# Patient Record
Sex: MALE | Race: WHITE | Employment: UNEMPLOYED | URBAN - METROPOLITAN AREA
[De-identification: names, ages, dates, MRNs, and addresses within clinical notes are randomized per-mention and may not be internally consistent; named-entity substitution may affect disease eponyms.]

---

## 2018-12-27 ENCOUNTER — OFFICE VISIT (OUTPATIENT)
Dept: NEUROLOGY | Age: 63
End: 2018-12-27

## 2018-12-27 VITALS
DIASTOLIC BLOOD PRESSURE: 80 MMHG | BODY MASS INDEX: 22.75 KG/M2 | WEIGHT: 162.5 LBS | SYSTOLIC BLOOD PRESSURE: 130 MMHG | HEART RATE: 60 BPM | RESPIRATION RATE: 20 BRPM | OXYGEN SATURATION: 98 % | HEIGHT: 71 IN

## 2018-12-27 DIAGNOSIS — F02.80 LEWY BODY DEMENTIA WITHOUT BEHAVIORAL DISTURBANCE (HCC): Primary | ICD-10-CM

## 2018-12-27 DIAGNOSIS — R26.9 GAIT ABNORMALITY: ICD-10-CM

## 2018-12-27 DIAGNOSIS — R26.89 BALANCE DISORDER: ICD-10-CM

## 2018-12-27 DIAGNOSIS — G31.83 LEWY BODY DEMENTIA WITHOUT BEHAVIORAL DISTURBANCE (HCC): Primary | ICD-10-CM

## 2018-12-27 DIAGNOSIS — G20 PARKINSONISM, UNSPECIFIED PARKINSONISM TYPE (HCC): ICD-10-CM

## 2018-12-27 RX ORDER — DONEPEZIL HYDROCHLORIDE 5 MG/1
10 TABLET, FILM COATED ORAL
Qty: 30 TAB | Refills: 11 | Status: SHIPPED | OUTPATIENT
Start: 2018-12-27 | End: 2019-07-19 | Stop reason: SDUPTHER

## 2018-12-27 RX ORDER — TRAZODONE HYDROCHLORIDE 100 MG/1
100 TABLET ORAL
COMMUNITY
End: 2018-12-27 | Stop reason: SDUPTHER

## 2018-12-27 RX ORDER — MEMANTINE HYDROCHLORIDE 10 MG/1
10 TABLET ORAL 2 TIMES DAILY
Qty: 60 TAB | Refills: 11 | Status: SHIPPED | OUTPATIENT
Start: 2018-12-27 | End: 2019-01-01 | Stop reason: SDUPTHER

## 2018-12-27 RX ORDER — CARBIDOPA AND LEVODOPA 25; 100 MG/1; MG/1
TABLET ORAL
Qty: 270 TAB | Refills: 11 | Status: SHIPPED | OUTPATIENT
Start: 2018-12-27 | End: 2019-07-31 | Stop reason: ALTCHOICE

## 2018-12-27 RX ORDER — TRAZODONE HYDROCHLORIDE 100 MG/1
TABLET ORAL
Qty: 60 TAB | Refills: 5 | Status: SHIPPED | OUTPATIENT
Start: 2018-12-27 | End: 2019-06-14 | Stop reason: SDUPTHER

## 2018-12-27 RX ORDER — CARBIDOPA AND LEVODOPA 25; 100 MG/1; MG/1
1 TABLET ORAL 3 TIMES DAILY
COMMUNITY
End: 2018-12-27

## 2018-12-27 RX ORDER — MEMANTINE HYDROCHLORIDE 10 MG/1
10 TABLET ORAL 2 TIMES DAILY
COMMUNITY
End: 2018-12-27 | Stop reason: SDUPTHER

## 2018-12-27 RX ORDER — DONEPEZIL HYDROCHLORIDE 5 MG/1
TABLET, FILM COATED ORAL
COMMUNITY
End: 2018-12-27

## 2018-12-27 NOTE — PROGRESS NOTES
Patient has parkinsons and memory loss. Patients wife states that the patient has stopped working 2 years ago and patients status has declined. Tremors have increased- since their moving in October. Patients wife states that he needs 24 hour help. Patient describes the tremors as pain and tingling. Patient has trouble comprehending words and statements. Forgetting his wife's name. Patients wife states that she takes care of him around the clock (ADLs). Patient has a familial history of alzheimer's (mother).

## 2018-12-27 NOTE — PROGRESS NOTES
[facility] [address]   [facility] [address]   Alicja Parkinson 56    [facility]    Fax             Referring: Self    Chief Complaint   Patient presents with    New Patient     parkinson's disease with dementia, anxiety and insomnia    56-year-old right-handed gentleman who presents today accompanied by his wife for what they call Parkinson's disease with dementia and anxiety and insomnia. He is unable to provide any meaningful type history. He says that he is worried that he has something medically wrong with him and he discusses the fact that he has pain that is annoying and his wife translates that to indicate that his feeling of shaking on the inside. She provides a history. 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 He was in charge of shouting the plant down if there was something unsafe for making sure that things were safe. He could do all types of calculations and again was a very successful  out in [place]. Around [id number] or so both his son and his wife started to notice was becoming forgetful. At work his boss asked if there was something she needed to know that was ongoing. He started getting lost when he would drive. He would put mayonnaise in the cabinet. He would bring nails after they moved into a new apartment that that he pulled out of the wall and bring them to his wife and ask if he needed to test the metal.  The memory got progressively worse. His mother had Alzheimer's disease and she passed away in 2017. She lived with the patient and his wife and his wife is her primary caregiver. Wife recounts a story of speaking with her  on the phone while he was at work in 2017 about his dying mother and he had no idea who his wife was. He was seen and evaluated in [place] by neurology. He was diagnosed with Lewy body disease.   He did have a response to Sinemet. They moved here as he was unable to work any longer and his wife had to quit her job to care for him. She has moved here with him and they are living with her mother at this point who unfortunately has stage IV breast cancer and now she is the caregiver for 2 people. He is not had any physical therapy is there have been some insurance issue. He is on Namenda 10 mg twice a day as well as Aricept 5 mg once a day and he uses Sinemet 25/101 tablet every 6 hours. She rarely notices any outward tremor. He more complains of feeling tremor on the inside. She indicates she gets quite anxious at times and she will give him 1/4 tablet of trazodone and that seems to calm things down. This is been a precipitous decline. He has been imaged. He has not had any dopamine agonist.  Only Sinemet. No recent falls but she indicates his balance is gotten worse. His wife has to help him with ADLs. He has difficulty comprehending directions. Review of [facility] notes from [facility] medical group Dr. Tanisha Mark, finds his initial visit was in January 2018 and the history was that he has had a progressive difficulty with memory and thinking for about the last year and about 3 months ago he began to get stiff and bradykinetic with difficulty walking. He was hospitalized at [facility] where he had MRI scans and blood test.  MRI scan was said to demonstrate moderate atrophy. Blood work all normal.  He was started on Sinemet 25/101 tablet 3 times daily. At his last visit with Dr. Ricardo White, he was on Sinemet 25/101 tablet 6 times daily. He was using trazodone 100 mg nightly and he would take 50 mg during the day for anxiety. He was started on Aricept titrating up to a dose of 10 mg daily but he did not tolerate that and fell back to 5 mg. He was started on Namenda titrating to 5 mg twice daily in addition to the Aricept.   His Sinemet at the end of the visit was 25/100 size 1 tablet every 3 hours. His wife also brought in reports of the MRI scans and interestingly there was one done November 21, 2016 for complaints of memory loss abnormal gait and difficulty talking which would seem to be in keeping with the cognitive difficulty ongoing about a year before they saw Dr. Eunice Lesches. These images are reviewed personally on disc and I concur with the reports of atrophy and small vessel white matter change. Past Medical History:   Diagnosis Date    Asthma     Musculoskeletal disorder     Stool color black        Past Surgical History:   Procedure Laterality Date    HX OTHER SURGICAL  2012    knee    HX OTHER SURGICAL  2015    neck       Trazodone 100 mg at 1.5 nightly and 1/4 tablet as needed anxiety  Namenda 10 mg twice daily  Aricept 5 mg daily  Carbidopa/levodopa 25/101 tablet every 6 hours    Not on File he is allergic to Xanax and that reaction was a paradoxic reaction and that it did not calm him he became quite agitated    Social History     Tobacco Use    Smoking status: Not on file   Substance Use Topics    Alcohol use: Not on file    Drug use: Not on file       Family History   Problem Relation Age of Onset    Dementia Mother     Heart Disease Mother     Cancer Father     Stroke Brother     Stroke Other        Review of Systems  He is unable to provide secondary to his condition    Examination  Visit Vitals  /80   Pulse 60   Resp 20   Ht 5' 11\" (1.803 m)   Wt 73.7 kg (162 lb 8 oz)   SpO2 98%   BMI 22.66 kg/m²     He is well-appearing. Quite appropriately dressed and quite appropriately groomed. Appears to be well cared for. Appears to have himself in good physical condition over the years. No icterus. No carotid bruit. His heart is regular. Pulses are symmetrical.  No edema lower extremities. Neurologically he is awake and alert. He perseverates. He has some aphasia.   He has difficulty registering items and he recalls 1/3 at about 2 minutes and 2/3 with hints. He says that the first day of the new year and the year is 2003. He can recall the president with a hand. He does describe the president. He correctly identifies the floor we are on any says there are 7/4 in $1.75. He has some difficulty with following commands and has to be reoriented at times in that regard. He has reactive pupils. Disc margins not well seen. He has full versions without any gaze limitation. No nystagmus but his pursuit is saccadic. Face symmetric with symmetric facial sensation. Tongue and palate are midline. Shoulder shrug symmetrical.  He has masked facies. He is a bit hypophonic. He has minimal rest tremor at the left hand. Cogwheeling at the wrist bilaterally. He is quite bradykinetic. He resists fully in the upper and lower extremities in all muscle groups to direct testing. Reflexes are symmetrical upper and lower extremities and there are no pathologic reflexes elicited. No ataxia. He arises from the table unassisted. He ambulates down the voss with a fairly good stride and actually takes a pivot turn. He is a little imbalance in the turn. Sensory intact primary. Impression/Plan  76-year-old gentleman with parkinsonism and increasing difficulty with bradykinesia balance etc. and this parkinsonism actually was preceded by a considerable decline in cognition and given this I agree with the diagnosis of Lewy body dementia. We discussed a Lewy body dementia is a progressive disorder and does not follow the typical parkinsonian course. We discussed that people will sometimes respond to Sinemet and other dopaminergic agents initially but do not get the robust response that we see in Parkinson's and that response is limited. We discussed that he will continue to get worse. We will try to increase his Aricept back to a dose of 10 mg daily. Continue Namenda and trazodone.   Increase Sinemet to a dose of 1.5 tablets and see if we can get any more out of that in terms of his bradykinesia. Some of what he describes in terms of discomfort may be dystonia although I cannot really get a good sense of that right now we will have to evaluate that. We will try to get him some physical therapy through [facility] due to their lack of insurance issues at this point. Answered his wife's questions today. Again discussed that the prognosis for this is a more rapid decline than would be expected with another dementing type process and our hope is to try to keep him as stable as we can for as long as we can but the expectation is that this will just be a progressive decline where he will need more and  more care. Follow-up in 3 months    Lizbeth Fitzpatrick MD      This note was created using voice recognition software. Despite editing, there may be syntax errors. This note will not be viewable in [address]

## 2019-01-01 ENCOUNTER — TELEPHONE (OUTPATIENT)
Dept: NEUROLOGY | Age: 64
End: 2019-01-01

## 2019-01-01 DIAGNOSIS — R26.89 BALANCE DISORDER: ICD-10-CM

## 2019-01-01 DIAGNOSIS — G31.83 LEWY BODY DEMENTIA WITHOUT BEHAVIORAL DISTURBANCE (HCC): ICD-10-CM

## 2019-01-01 DIAGNOSIS — R26.9 GAIT ABNORMALITY: ICD-10-CM

## 2019-01-01 DIAGNOSIS — F02.80 LEWY BODY DEMENTIA WITHOUT BEHAVIORAL DISTURBANCE (HCC): ICD-10-CM

## 2019-01-01 DIAGNOSIS — G20 PARKINSONISM, UNSPECIFIED PARKINSONISM TYPE (HCC): Primary | ICD-10-CM

## 2019-01-01 RX ORDER — PRAMIPEXOLE DIHYDROCHLORIDE 0.5 MG/1
0.5 TABLET ORAL 3 TIMES DAILY
Qty: 270 TAB | Refills: 1 | Status: SHIPPED | OUTPATIENT
Start: 2019-01-01 | End: 2020-01-01

## 2019-01-01 RX ORDER — MEMANTINE HYDROCHLORIDE 10 MG/1
10 TABLET ORAL 2 TIMES DAILY
Qty: 180 TAB | Refills: 1 | Status: SHIPPED | OUTPATIENT
Start: 2019-01-01 | End: 2020-01-01

## 2019-01-01 RX ORDER — QUETIAPINE FUMARATE 50 MG/1
50 TABLET, FILM COATED ORAL
Qty: 90 TAB | Refills: 1 | Status: SHIPPED | OUTPATIENT
Start: 2019-01-01 | End: 2020-01-01 | Stop reason: SDUPTHER

## 2019-01-01 RX ORDER — DONEPEZIL HYDROCHLORIDE 5 MG/1
TABLET, FILM COATED ORAL
Qty: 180 TAB | Refills: 1 | Status: SHIPPED | OUTPATIENT
Start: 2019-01-01 | End: 2020-01-01

## 2019-01-01 RX ORDER — TRAZODONE HYDROCHLORIDE 100 MG/1
TABLET ORAL
Qty: 180 TAB | Refills: 1 | Status: SHIPPED | OUTPATIENT
Start: 2019-01-01 | End: 2020-01-01 | Stop reason: DRUGHIGH

## 2019-01-10 ENCOUNTER — APPOINTMENT (OUTPATIENT)
Dept: PHYSICAL THERAPY | Age: 64
End: 2019-01-10

## 2019-03-28 ENCOUNTER — OFFICE VISIT (OUTPATIENT)
Dept: NEUROLOGY | Age: 64
End: 2019-03-28

## 2019-03-28 VITALS
DIASTOLIC BLOOD PRESSURE: 78 MMHG | WEIGHT: 166.5 LBS | HEIGHT: 71 IN | HEART RATE: 77 BPM | OXYGEN SATURATION: 97 % | BODY MASS INDEX: 23.31 KG/M2 | SYSTOLIC BLOOD PRESSURE: 112 MMHG

## 2019-03-28 DIAGNOSIS — G31.83 LEWY BODY DEMENTIA WITHOUT BEHAVIORAL DISTURBANCE (HCC): Primary | ICD-10-CM

## 2019-03-28 DIAGNOSIS — F02.80 LEWY BODY DEMENTIA WITHOUT BEHAVIORAL DISTURBANCE (HCC): Primary | ICD-10-CM

## 2019-03-28 NOTE — PROGRESS NOTES
Chief Complaint Patient presents with  Tremors  
  parkinson's f/u, painful twitching/shaking that causes cold/clammy skin. Taking 0.25 tab of trazodone more frequently during day d/t this.  will get up 3-4 times per night.  Dementia  
  f/u.  seems to need more help throughout the day and unable to really verbalize feelings/thoughts. Very pleasant demeanor. Getting exersize 3 times per week with nephew who is a  Wanted to know what resources are available for her/pt regarding dementia and what to expect long term Coordination of Care Questions 1. Have you been to the ER, urgent care clinic outside of [facility] since your last visit? No  
    Hospitalized since your last visit? No 
 
2. Have you seen or consulted any other health care providers outside of the [address] [place] [address] since your last visit? Include any pap smears or colon screening.  No

## 2019-03-28 NOTE — PROGRESS NOTES
[facility] Neurology Clinics and [address] at [facility] Neurology Clinics at [address] [place], [address] [facility] [address] [facility], [address] [place] [address]  
[phone] Chief Complaint Patient presents with  Tremors  
  parkinson's f/u, painful twitching/shaking that causes cold/clammy skin. Taking 0.25 tab of trazodone more frequently during day d/t this.  will get up 3-4 times per night.  Dementia  
  f/u.  seems to need more help throughout the day and unable to really verbalize feelings/thoughts. Very pleasant demeanor. Getting exersize 3 times per week with nephew who is a  Current Outpatient Medications Medication Sig Dispense Refill  donepezil (ARICEPT) 5 mg tablet Take 2 Tabs by mouth nightly. 30 Tab 11  
 carbidopa-levodopa (SINEMET)  mg per tablet 1.5 tabs 6 times daily 270 Tab 11  
 memantine (NAMENDA) 10 mg tablet Take 1 Tab by mouth two (2) times a day. 60 Tab 11  
 traZODone (DESYREL) 100 mg tablet Take 1.5 tablet at bedtime daily, 1/4 of a tab as needed for anxiety 60 Tab 5 No Known Allergies Social History Tobacco Use  Smoking status: Never Smoker  Smokeless tobacco: Never Used Substance Use Topics  Alcohol use: Not Currently Frequency: Never  Drug use: Never Patient returns today accompanied by his wife. He has Lewy body disease. Last visit we increased his Sinemet to a dose of 1.5 tablets and he is taking 6 times daily and we increased his Aricept up to a dose of 10 mg daily. We continued Namenda and trazodone. The increased dose of Sinemet seems to have done well. He is moving well. He is working out with his nephew i.e. his wife's sister's son 3 times a week. No falls. Still gets 1/4 tablet of trazodone several times a day at times for agitation. No dangerous behavior.   His wife is very attentive and make sure he gets his medication around the clock. Concerns about resources. Concerns about his decline. Questions regarding such. Examination Visit Vitals /78 (BP 1 Location: Left arm, BP Patient Position: Sitting) Pulse 77 Ht 5' 11\" (1.803 m) Wt 75.5 kg (166 lb 8 oz) SpO2 97% BMI 23.22 kg/m² He looks well. He is quite appropriately dressed and groomed. And is very well cared for. He is awake and alert. He tells me that the president is Trump and that he is in some type of trouble but is not sure what kind. He believes it is May. He follows commands. He has a fairly good stride. He is not retropulsive Impression/Plan Lewy body dementia. Questions asked today regarding decline, life expectancy, medications, and how to look for resources. We will continue her current medication regimen. We will arrange for a meeting with the Alzheimer's [employer] discussed available resources. Follow with me in about 3 months Total time: 25 min Counseling / coordination time: 15 min  
> 50% counseling / coordination?: Yes re: as documented above Paulette Lazaro MD 
 
 
This note was created using voice recognition software. Despite editing, there may be syntax errors. This note will not be viewable in [address]

## 2019-04-05 ENCOUNTER — TELEPHONE (OUTPATIENT)
Dept: NEUROLOGY | Age: 64
End: 2019-04-05

## 2019-04-11 ENCOUNTER — TELEPHONE (OUTPATIENT)
Dept: NEUROLOGY | Age: 64
End: 2019-04-11

## 2019-04-11 NOTE — TELEPHONE ENCOUNTER
----- Message from Jenna Morales sent at 4/11/2019  1:58 PM EDT -----  Regarding: Dr Amira Nelson  Pt's wife (p) [phone], would like to know if he can take Zyrtec with his current medication.

## 2019-04-11 NOTE — TELEPHONE ENCOUNTER
S/w pt's wife, told her Zyrtec should be fine to take. She stated he had a rxn to alprazolam and wanted chart updated.

## 2019-04-15 ENCOUNTER — TELEPHONE (OUTPATIENT)
Dept: NEUROLOGY | Age: 64
End: 2019-04-15

## 2019-05-29 ENCOUNTER — OFFICE VISIT (OUTPATIENT)
Dept: NEUROLOGY | Age: 64
End: 2019-05-29

## 2019-05-29 VITALS
BODY MASS INDEX: 22.89 KG/M2 | DIASTOLIC BLOOD PRESSURE: 62 MMHG | HEIGHT: 71 IN | SYSTOLIC BLOOD PRESSURE: 100 MMHG | WEIGHT: 163.5 LBS | RESPIRATION RATE: 20 BRPM

## 2019-05-29 DIAGNOSIS — F02.80 LEWY BODY DEMENTIA WITHOUT BEHAVIORAL DISTURBANCE (HCC): Primary | ICD-10-CM

## 2019-05-29 DIAGNOSIS — G31.83 LEWY BODY DEMENTIA WITHOUT BEHAVIORAL DISTURBANCE (HCC): Primary | ICD-10-CM

## 2019-05-29 RX ORDER — QUETIAPINE FUMARATE 25 MG/1
25 TABLET, FILM COATED ORAL
Qty: 30 TAB | Refills: 3 | Status: SHIPPED | OUTPATIENT
Start: 2019-05-29 | End: 2019-09-13

## 2019-05-29 NOTE — PROGRESS NOTES
[facility] Neurology Clinics and [address]aman at [facility] Neurology Clinics at [place] [id number] [address] [place] [address] Dr [place], [address] [facility] [address] [facility], [address] [place] [address]  
[phone] Chief Complaint Patient presents with  Memory Loss Current Outpatient Medications Medication Sig Dispense Refill  donepezil (ARICEPT) 5 mg tablet Take 2 Tabs by mouth nightly. 30 Tab 11  
 carbidopa-levodopa (SINEMET)  mg per tablet 1.5 tabs 6 times daily 270 Tab 11  
 memantine (NAMENDA) 10 mg tablet Take 1 Tab by mouth two (2) times a day. 60 Tab 11  
 traZODone (DESYREL) 100 mg tablet Take 1.5 tablet at bedtime daily, 1/4 of a tab as needed for anxiety 60 Tab 5 Allergies Allergen Reactions  Alprazolam Rash Social History Tobacco Use  Smoking status: Never Smoker  Smokeless tobacco: Never Used Substance Use Topics  Alcohol use: Not Currently Frequency: Never  Drug use: Never Patient returns today coming by his wife for follow-up Lewy body disease. He has been maintained on Aricept, Namenda as well as Sinemet and does a real.  Last visit they were supposed to go to talk with the Alzheimer's Association. That did not happen. She today is concerned more about anxiety. Patient is taking trazodone 1-1/2 tablets at bedtime and a quarter of a tablet as needed for anxiety from his previous regimen out in [place]. The 6 times daily regimen of Sinemet also was started in [place]. This is a sooner than expected appointment. Wife notes have a significant decline. He is having more issues with anxiety during the day. She is using a little more trazodone. She is very concerned about his decline. She feeds him. She Debo. When he takes his pills now he chews them and then he looks at the glass of water like he does not know what to do. He is losing more functional capability. Examination Visit Vitals /62 Resp 20 Ht 5' 11\" (1.803 m) Wt 74.2 kg (163 lb 8 oz) BMI 22.80 kg/m² He appears very well cared for. He is appropriately dressed and appropriately groomed. He asked if he is going to wind this disease. Masked facies. Bradykinetic. No tremor. Impression/Plan Advancing Lewy body type dementia. We discussed this and answered her questions today about decline, keeping him comfortable, prognosis, progression etc.  We discussed using some Seroquel at night because he wakes up every 2 hours to take Sinemet. We discussed that that really not necessary but apparently he wakes up and needs that dose he believes. That certainly not going to hurt him but it does keep him more drowsy during the day. We discussed decline. We discussed using some Seroquel at night. We will start 25 mg nightly. We will see if that can help him sleep through the night so he will be less tired during the day but then also help with some of the anxiety. Discussed that we may need to increase that dose. Follow in about 8 weeks Morena Carlson MD 
 
Total time: 25 min Counseling / coordination time: 20 min  
> 50% counseling / coordination?: Yes re: as documented above This note was created using voice recognition software. Despite editing, there may be syntax errors. This note will not be viewable in [address]

## 2019-06-16 RX ORDER — TRAZODONE HYDROCHLORIDE 100 MG/1
TABLET ORAL
Qty: 53 TAB | Refills: 4 | Status: SHIPPED | OUTPATIENT
Start: 2019-06-16 | End: 2019-10-17 | Stop reason: SDUPTHER

## 2019-07-19 RX ORDER — DONEPEZIL HYDROCHLORIDE 5 MG/1
TABLET, FILM COATED ORAL
Qty: 60 TAB | Refills: 10 | Status: SHIPPED | OUTPATIENT
Start: 2019-07-19 | End: 2019-01-01 | Stop reason: SDUPTHER

## 2019-07-31 ENCOUNTER — OFFICE VISIT (OUTPATIENT)
Dept: NEUROLOGY | Age: 64
End: 2019-07-31

## 2019-07-31 ENCOUNTER — TELEPHONE (OUTPATIENT)
Dept: NEUROLOGY | Age: 64
End: 2019-07-31

## 2019-07-31 VITALS
SYSTOLIC BLOOD PRESSURE: 110 MMHG | HEART RATE: 84 BPM | RESPIRATION RATE: 14 BRPM | DIASTOLIC BLOOD PRESSURE: 76 MMHG | OXYGEN SATURATION: 98 % | HEIGHT: 71 IN | WEIGHT: 156 LBS | BODY MASS INDEX: 21.84 KG/M2

## 2019-07-31 DIAGNOSIS — G20 PARKINSONISM, UNSPECIFIED PARKINSONISM TYPE (HCC): Primary | ICD-10-CM

## 2019-07-31 DIAGNOSIS — G31.83 LEWY BODY DEMENTIA WITHOUT BEHAVIORAL DISTURBANCE (HCC): ICD-10-CM

## 2019-07-31 DIAGNOSIS — F02.80 LEWY BODY DEMENTIA WITHOUT BEHAVIORAL DISTURBANCE (HCC): ICD-10-CM

## 2019-07-31 NOTE — PROGRESS NOTES
[facility] Neurology Clinics and [address] Zaman at [facility] Neu[facility] Clinics at [facility] [address] [place], [address] [facility] [address] [facility] [place], [address] [place] [address]  
[phone] Chief Complaint Patient presents with  Tremors  
  sx progressing, difficulty with \"the shakes\" giving carp/levo every 2 hrs because the tremors bring anxiety. Last night it took one hr to calm down with meds and music. [facility] but recently was unable to kick legs  Dementia Current Outpatient Medications Medication Sig Dispense Refill  donepezil (ARICEPT) 5 mg tablet TAKE TWO TABLETS BY MOUTH EVERY NIGHT AT BEDTIME 60 Tab 10  
 traZODone (DESYREL) 100 mg tablet TAKE ONE AND ONE-HALF (1 & 1/2) TABLET BY MOUTH DAILY AND TAKE ONE-FOURTH TABLET DAILY AS NEEDED FOR ANXIETY 53 Tab 4  
 QUEtiapine (SEROQUEL) 25 mg tablet Take 1 Tab by mouth nightly. 30 Tab 3  carbidopa-levodopa (SINEMET)  mg per tablet 1.5 tabs 6 times daily 270 Tab 11  
 memantine (NAMENDA) 10 mg tablet Take 1 Tab by mouth two (2) times a day. 60 Tab 11 Allergies Allergen Reactions  Alprazolam Rash Social History Tobacco Use  Smoking status: Never Smoker  Smokeless tobacco: Never Used Substance Use Topics  Alcohol use: Not Currently Frequency: Never  Drug use: Never Patient returns today accompanied by his wife and she provides history. He has advancing Lewy body type dementia. I last saw them in May about 2 months ago and they come today with a sooner than expected appointment secondary to what the patient's wife reports is advancing symptoms. She says that he has a lot of difficulty with shaking and she is giving him the Sinemet every 2 hours because the tremors caused him to be anxious. He has decreased his activity.   Usually he will go to the [facility] and swims laps but recently he has been unable to do that and now he just stands in the pool. Wife has questions about progression, expectation, medications etc.  She is giving him the Sinemet every 2-3 hours. He says that he feels like he is shaking in the inside and pain that sounds like dystonia. No outward sign of dystonia. No fall. No recent infection. No nausea or vomiting. Wife again has multiple questions and concerns today and all of these were addressed Examination Visit Vitals /76 (BP 1 Location: Left arm, BP Patient Position: Sitting) Pulse 84 Resp 14 Ht 5' 11\" (1.803 m) Wt 70.8 kg (156 lb) SpO2 98% BMI 21.76 kg/m² Pleasant well-dressed gentleman. Appropriately groomed. Mask facies. Cogwheeling and bradykinesia throughout. Good strength. Steady gait. Difficulty in articulating himself. Impression/Plan Advancing Lewy body type dementia. Answered all questions and concerns today. Some of his symptoms may be due to fluctuating dopamine levels with the immediate release. We will use Rytary 4 times daily. Follow in 6 weeks. Total time: 30 min Counseling / coordination time: 20 min  
> 50% counseling / coordination?: Yes re: as documented above Ike Germain MD 
 
 
This note was created using voice recognition software. Despite editing, there may be syntax errors. This note will not be viewable in [address]

## 2019-07-31 NOTE — PROGRESS NOTES
Chief Complaint Patient presents with  Tremors  
  sx progressing, difficulty with \"the shakes\" giving carp/levo every 2 hrs because the tremors bring anxiety. Last night it took one hr to calm down with meds and music. [facility] would [facility] but recently was unable to kick legs  Dementia Would like to know if progression is faster than normal and what to expect so she may mentally prepare Coordination of Care Questions 1. Have you been to the ER, urgent care clinic outside of [facility] since your last visit? No  
    Hospitalized since your last visit? No 
 
2. Have you seen or consulted any other health care providers outside of the [address] [place] [address] since your last visit? Include any pap smears or colon screening.  No

## 2019-07-31 NOTE — TELEPHONE ENCOUNTER
Oma Mccullough is requesting that the patient can take the  carbidopa-levodopa  Not the   carbidopa-levodopa ER because the rx is $400 and they can not pay for it each month.   Oma Mccullough would like a call back today

## 2019-08-01 NOTE — TELEPHONE ENCOUNTER
Sent Sussy joiner msg to see what resources may be offered to pt regarding out of pocket cost, will call Mrs. Talamantes back once I have more information

## 2019-08-01 NOTE — TELEPHONE ENCOUNTER
S/w Sussy who gave me information about [facility] enrollment. Forms filled out and Alfredo Albin notified and she will come in to office tomorrow to fill out pt's portion.

## 2019-08-14 ENCOUNTER — TELEPHONE (OUTPATIENT)
Dept: NEUROLOGY | Age: 64
End: 2019-08-14

## 2019-08-14 NOTE — TELEPHONE ENCOUNTER
----- Message from Kiet Olguin 5777 sent at 8/14/2019  1:44 PM EDT -----  Regarding: Dr Kenna Childress, spouse, is requesting a callback because the pt is taking \"rytary\", which it seems to work, but she would like to know if he can take it every five hours throughout the day instead of six hours.        Best contact number is [phone]

## 2019-08-19 ENCOUNTER — TELEPHONE (OUTPATIENT)
Dept: NEUROLOGY | Age: 64
End: 2019-08-19

## 2019-08-19 DIAGNOSIS — G20 PARKINSONISM, UNSPECIFIED PARKINSONISM TYPE (HCC): ICD-10-CM

## 2019-08-19 DIAGNOSIS — F02.80 LEWY BODY DEMENTIA WITHOUT BEHAVIORAL DISTURBANCE (HCC): Primary | ICD-10-CM

## 2019-08-19 DIAGNOSIS — G31.83 LEWY BODY DEMENTIA WITHOUT BEHAVIORAL DISTURBANCE (HCC): Primary | ICD-10-CM

## 2019-08-19 NOTE — TELEPHONE ENCOUNTER
Shaniaary needs new rx printed to state one cap every 5 hrs.   Printed per VO Dr. Goldie Gonzalez and faxed

## 2019-08-21 ENCOUNTER — TELEPHONE (OUTPATIENT)
Dept: NEUROLOGY | Age: 64
End: 2019-08-21

## 2019-08-21 NOTE — TELEPHONE ENCOUNTER
Kaia Barbosa from [facility] is requesting a call back because they got a prescription with a dosage change and they need to clarify it.

## 2019-08-30 ENCOUNTER — TELEPHONE (OUTPATIENT)
Dept: NEUROLOGY | Age: 64
End: 2019-08-30

## 2019-08-30 DIAGNOSIS — F02.80 LEWY BODY DEMENTIA WITHOUT BEHAVIORAL DISTURBANCE (HCC): ICD-10-CM

## 2019-08-30 DIAGNOSIS — G20 PARKINSONISM, UNSPECIFIED PARKINSONISM TYPE (HCC): ICD-10-CM

## 2019-08-30 DIAGNOSIS — G31.83 LEWY BODY DEMENTIA WITHOUT BEHAVIORAL DISTURBANCE (HCC): ICD-10-CM

## 2019-08-30 NOTE — TELEPHONE ENCOUNTER
----- Message from Rere Sultana sent at 8/30/2019 10:03 AM EDT -----  Regarding: Dr Escobedo/telephone  Patient return call    Caller's first and last name and relationship (if not the patient): Charly Gilflorencio Albin,pt's wife      Best contact number(s): [phone]      Whose call is being returned:the nurse      Details to clarify the request: may be regarding his Rytary medication.  Rolanda Nielson he will need more of the Rytary  Medication in a few days       Rere Sultana

## 2019-08-30 NOTE — TELEPHONE ENCOUNTER
----- Message from Crow Sexton sent at 8/30/2019 10:08 AM EDT -----  Regarding: Dr. Latia Singh  Pt wife Amadeo Parker reporting that they will come to the office today to  medication. Best contact [phone].

## 2019-08-30 NOTE — TELEPHONE ENCOUNTER
LVM, samples of Rytary 48.75/195mg Take one cap po every five hours ordered and set aside for pt per VO Dr. Ck Heath

## 2019-09-04 ENCOUNTER — TELEPHONE (OUTPATIENT)
Dept: NEUROLOGY | Age: 64
End: 2019-09-04

## 2019-09-04 DIAGNOSIS — G31.83 LEWY BODY DEMENTIA WITHOUT BEHAVIORAL DISTURBANCE (HCC): Primary | ICD-10-CM

## 2019-09-04 DIAGNOSIS — F02.80 LEWY BODY DEMENTIA WITHOUT BEHAVIORAL DISTURBANCE (HCC): Primary | ICD-10-CM

## 2019-09-04 DIAGNOSIS — G20 PARKINSONISM, UNSPECIFIED PARKINSONISM TYPE (HCC): ICD-10-CM

## 2019-09-04 RX ORDER — PRAMIPEXOLE DIHYDROCHLORIDE 0.5 MG/1
0.5 TABLET ORAL 3 TIMES DAILY
Qty: 90 TAB | Refills: 6 | Status: SHIPPED | OUTPATIENT
Start: 2019-09-04 | End: 2019-01-01 | Stop reason: SDUPTHER

## 2019-09-04 NOTE — TELEPHONE ENCOUNTER
Rytary is ONE cap every 5 hours. This was faxed to [facility]. Pt had difficulty last night, severe twitching in legs and very uncomfortable. Is there something pt may take between now and appt on 9/13/19.     SA form filled out for aquatic therapy

## 2019-09-04 NOTE — TELEPHONE ENCOUNTER
Jeaneth Forte a  from [address] [place] [address] wanted to make sure the prescription is is accurate before they fill it.

## 2019-09-04 NOTE — TELEPHONE ENCOUNTER
----- Message from Susana Strickland sent at 9/4/2019 10:26 AM EDT -----  Regarding: Dr chambers/ Hollie Baker Message/Vendor Calls    Caller's first and last name: Minh Cabrera, spouse      Reason for call: stated she received the pt's \"rytary\" today and the direction say two pills every five hours and she wants to make sure that correct because he had been taking only one pill every five hours      Callback required yes/no and why: yes      Best contact number(s): [phone]      Details to clarify the request:      [facility]

## 2019-09-13 ENCOUNTER — OFFICE VISIT (OUTPATIENT)
Dept: NEUROLOGY | Age: 64
End: 2019-09-13

## 2019-09-13 ENCOUNTER — TELEPHONE (OUTPATIENT)
Dept: NEUROLOGY | Age: 64
End: 2019-09-13

## 2019-09-13 VITALS
OXYGEN SATURATION: 99 % | HEART RATE: 81 BPM | DIASTOLIC BLOOD PRESSURE: 68 MMHG | RESPIRATION RATE: 16 BRPM | WEIGHT: 157 LBS | SYSTOLIC BLOOD PRESSURE: 106 MMHG | BODY MASS INDEX: 21.98 KG/M2 | HEIGHT: 71 IN

## 2019-09-13 DIAGNOSIS — G20 PARKINSONISM, UNSPECIFIED PARKINSONISM TYPE (HCC): ICD-10-CM

## 2019-09-13 DIAGNOSIS — G31.83 LEWY BODY DEMENTIA WITHOUT BEHAVIORAL DISTURBANCE (HCC): Primary | ICD-10-CM

## 2019-09-13 DIAGNOSIS — F02.80 LEWY BODY DEMENTIA WITHOUT BEHAVIORAL DISTURBANCE (HCC): Primary | ICD-10-CM

## 2019-09-13 RX ORDER — QUETIAPINE FUMARATE 50 MG/1
50 TABLET, FILM COATED ORAL
Qty: 30 TAB | Refills: 3 | Status: SHIPPED | OUTPATIENT
Start: 2019-09-13 | End: 2019-01-01 | Stop reason: SDUPTHER

## 2019-09-13 NOTE — TELEPHONE ENCOUNTER
----- Message from Tasha Solorzano sent at 9/13/2019  4:04 PM EDT -----  Regarding: Dr. Grayson Serrano first and last name:[facility] Nurse      Reason for call:  Needing carnification on the work order that was sent in today for the pt       Callback required yes/no and why: yes      Best contact number(s): [phone] fax [phone]      Details to clarify the request:      Tasha Solorzano

## 2019-09-13 NOTE — PROGRESS NOTES
Chief Complaint   Patient presents with    Dementia     urinary incontinence, wears pull-ups now. A lot of caregiver role strain for wife.   Would like referral to get in home aid

## 2019-09-13 NOTE — PROGRESS NOTES
[facility] Neurology Clinics and [address] at [facility] Neurology Clinics at [address] [facility], [address] [facility] [address] [facility], [address] [place] [address]   [phone]              Chief Complaint   Patient presents with    Dementia     urinary incontinence, wears pull-ups now. Current Outpatient Medications   Medication Sig Dispense Refill    pramipexole (MIRAPEX) 0.5 mg tablet Take 1 Tab by mouth three (3) times daily. 90 Tab 6    donepezil (ARICEPT) 5 mg tablet TAKE TWO TABLETS BY MOUTH EVERY NIGHT AT BEDTIME 60 Tab 10    traZODone (DESYREL) 100 mg tablet TAKE ONE AND ONE-HALF (1 & 1/2) TABLET BY MOUTH DAILY AND TAKE ONE-FOURTH TABLET DAILY AS NEEDED FOR ANXIETY 53 Tab 4    QUEtiapine (SEROQUEL) 25 mg tablet Take 1 Tab by mouth nightly. 30 Tab 3    memantine (NAMENDA) 10 mg tablet Take 1 Tab by mouth two (2) times a day. 60 Tab 11    carbidopa-levodopa ER (RYTARY) 48. mg per capsule Take one cap po every five hours 200 Cap 0      Allergies   Allergen Reactions    Alprazolam Rash     Social History     Tobacco Use    Smoking status: Never Smoker    Smokeless tobacco: Never Used   Substance Use Topics    Alcohol use: Not Currently     Frequency: Never    Drug use: Never     Patient returns today coming by his wife for Lewy body dementia. We started to use Rytary secondary to the thought that the extended release might be better. He continues to decline. He has become incontinent of urine. Now wearing pull-ups. At night he gets more disoriented. She is not sleeping. She is exhausted. She is having burnout. She is distraught. She tries to sleep but cannot. She is also dealing as a caregiver for her mother with cancer. The Rytary has helped. Getting that through the [address] [place] [address] [facility] [employer] program.  Also she thinks the Mirapex and Seroquel are helping. He still up at night.     Examination  Visit Vitals  /68 (BP 1 Location: Left arm, BP Patient Position: Sitting)   Pulse 81   Resp 16   Ht 5' 11\" (1.803 m)   Wt 71.2 kg (157 lb)   SpO2 99%   BMI 21.90 kg/m²     He is awake and alert. Parkinsonian appearance. Not oriented. He does however come over and consult his wife when she starts to cry during our visit    Impression/Plan  Lewy body dementia advancing with significant caregiver burnout. We discussed this today at length. We are going to maintain the Rytary and Mirapex for now. Continue the trazodone. Increase Seroquel to a dose of 50 mg nightly. We will have her meet with the Alzheimer's [employer] to look for resources such as respite, aids, etc.  We will send out some home health to see if they can at least provide some time for her to get a break and to work with him. Discussed with his wife at length the decline in the need for her to get some additional assistance and she realizes that she is unable to do everything herself. She is done a good job but she does need some help and has to care for herself as well. Follow-up in 6 weeks    Total time: 30 min   Counseling / coordination time: 20 min   > 50% counseling / coordination?: Yes re: as documented above      Ye Interiano MD      This note was created using voice recognition software. Despite editing, there may be syntax errors. This note will not be viewable in [address]

## 2019-09-16 NOTE — TELEPHONE ENCOUNTER
S/w [address], notified that pt will be sent to [facility].

## 2019-10-17 RX ORDER — TRAZODONE HYDROCHLORIDE 100 MG/1
TABLET ORAL
Qty: 60 TAB | Refills: 3 | Status: SHIPPED | OUTPATIENT
Start: 2019-10-17 | End: 2019-01-01 | Stop reason: SDUPTHER

## 2019-10-18 RX ORDER — TRAZODONE HYDROCHLORIDE 100 MG/1
TABLET ORAL
Qty: 60 TAB | Refills: 3 | Status: SHIPPED | OUTPATIENT
Start: 2019-10-18 | End: 2019-10-24 | Stop reason: SDUPTHER

## 2019-10-24 ENCOUNTER — OFFICE VISIT (OUTPATIENT)
Dept: NEUROLOGY | Age: 64
End: 2019-10-24

## 2019-10-24 VITALS
DIASTOLIC BLOOD PRESSURE: 86 MMHG | HEIGHT: 71 IN | BODY MASS INDEX: 21.84 KG/M2 | OXYGEN SATURATION: 99 % | RESPIRATION RATE: 14 BRPM | HEART RATE: 83 BPM | WEIGHT: 156 LBS | SYSTOLIC BLOOD PRESSURE: 136 MMHG

## 2019-10-24 DIAGNOSIS — G31.83 LEWY BODY DEMENTIA WITHOUT BEHAVIORAL DISTURBANCE (HCC): Primary | ICD-10-CM

## 2019-10-24 DIAGNOSIS — F02.80 LEWY BODY DEMENTIA WITHOUT BEHAVIORAL DISTURBANCE (HCC): Primary | ICD-10-CM

## 2019-10-24 NOTE — PROGRESS NOTES
[facility] Neurology Clinics and [address] at [facility] Neurology Clinics at [place] [id number] [place] [address] Dr [place], [address] [facility] [address] [facility], [address] [place] [address]  
[phone] Chief Complaint Patient presents with  Dementia LBD Current Outpatient Medications Medication Sig Dispense Refill  traZODone (DESYREL) 100 mg tablet TAKE ONE AND ONE-HALF TABLET BY MOUTH DAILY AND TAKE ONE-FOURTH TABLET BY MOUTH DAILY AS NEEDED FOR ANXIETY 60 Tab 3  
 traZODone (DESYREL) 100 mg tablet TAKE ONE AND ONE-HALF TABLET BY MOUTH DAILY AND TAKE ONE-FOURTH TABLET BY MOUTH DAILY AS NEEDED FOR ANXIETY 60 Tab 3  
 QUEtiapine (SEROQUEL) 50 mg tablet Take 1 Tab by mouth nightly. 30 Tab 3  pramipexole (MIRAPEX) 0.5 mg tablet Take 1 Tab by mouth three (3) times daily. 90 Tab 6  carbidopa-levodopa ER (RYTARY) 48. mg per capsule Take one cap po every five hours 200 Cap 0  
 donepezil (ARICEPT) 5 mg tablet TAKE TWO TABLETS BY MOUTH EVERY NIGHT AT BEDTIME 60 Tab 10  
 memantine (NAMENDA) 10 mg tablet Take 1 Tab by mouth two (2) times a day. 60 Tab 11 Allergies Allergen Reactions  Alprazolam Rash Social History Tobacco Use  Smoking status: Never Smoker  Smokeless tobacco: Never Used Substance Use Topics  Alcohol use: Not Currently Frequency: Never  Drug use: Never Patient returns today for follow-up. He has Lewy body dementia. He has continued to decline. He is becoming more aphasic. He is sleeping better with the Seroquel. His wife notes that she is sleeping in another room but with the door open she can see him. He is sleeping at least 5 hours or so at night which is better for her. She did meet with the Alzheimer's Association and she has a neftali for respite and someone will be coming in a couple of hours a few times a week to help out.   Her family is also helping out more as well. Mirapex and Rytary seem to be helping with his other symptoms. No falls. He did have 2 episodes where he seemed to pocket food for a bit. No ksenia choking. We discussed speech therapy evaluation and swallow eval but we will wait and see if this is more of a recurrent issue. She does discuss swallowing technique and she does practice that. Questions and concerns today regarding medication availability due to his insurance and she is really trying to trains that. We discussed caregiver burnout and his wife really looks much more relaxed today. She was quite frazzled and upset last visit. He continues to respond well to the Desyrel as well. Discussed concerns about decline. Discussed the eventual decline. His examination today finds him to be more aphasic and he has a really difficult time even try to explain what he wants to say and he does get out some things that make sense but most do not. He is bradykinetic. Slow gait but steady. No 
 
Continue with our current regimen as outlined above in the medication section. Continue to work to prevent caregiver burnout and to acquire resources for increasing care. Watch swallowing and if he has another event we will get a formal swallowing evaluation. If nothing changes and he continues to stay stable I will see him back after the holidays Total time: 25 min Counseling / coordination time: 20 min  
> 50% counseling / coordination?: Yes re: as documented above Examination There were no vitals taken for this visit. Impression/Plan Willian Falcon MD 
 
 
This note was created using voice recognition software. Despite editing, there may be syntax errors. This note will not be viewable in [address]

## 2019-10-24 NOTE — PROGRESS NOTES
Pt is currently going to PT with [facility] for Parkinson's then will do aquatic therapy after. Approved for respit neftali which will cover 4 hours a day for 2 days per week

## 2019-10-24 NOTE — Clinical Note
October 24, 2019 Dear Mat Tran, We are pleased to provide you with secure, online access to medical information via SightCall for: 
 
Giovanna Moore. How Do I Sign Up? 1. In your internet browser, go to [email or web address] 
 
2. Click on the Sign Up Now link in the Sign In box. You will see the New Member Sign Up page. 3. Enter your SightCall Access Code exactly as it appears below. You will not need to use this code after youve completed the sign-up process. If you do not sign up before the expiration date, you must request a new code. SightCall Access Code: [email or web address] Expiration Date: 12/8/2019  5:07 PM  
 
4. In the Social Security Number field, enter your Social Security Number and your Date of Birth (mm/dd/yyyy) and click Submit. You will be taken to the next sign-up page. 5. Create a SightCall ID. This will be your SightCall login ID and cannot be changed, so think of one that is secure and easy to remember. 6. Create a SightCall password. You can change your password at any time. 7. Enter your Password Reset Question and Answer. This can be used at a later time if you forget your password. 8. Enter your e-mail address. You will receive e-mail notification when new information is available in [address] 9. Click Sign Up. You can now view the [employer] account of Giovanna MooreAlfredo Jacobsen Additional Information If you have questions, you can call [phone]. Remember, SightCall is NOT to be used for urgent needs. For medical emergencies, dial 911. Now available from your iPhone and Android! Sincerely, 
  
 
Jody Cárdenas

## 2019-11-20 ENCOUNTER — TELEPHONE (OUTPATIENT)
Dept: NEUROLOGY | Age: 64
End: 2019-11-20

## 2019-11-20 NOTE — TELEPHONE ENCOUNTER
----- Message from Christin Willard sent at 11/19/2019  4:27 PM EST -----  Regarding: Dr Jaylyn James Message/Vendor Calls    Caller's first and last name: Horace Del Valle, pt's wife      Reason for call: to give an update on [facility] plans for her       Callback required yes/no and why:for reason given below      Best contact number(s):[phone]      Details to clarify the request: pt's wife said [facility] will be faxing over a auth for her  to continue with [facility] , they want to make a smooth transition from the Aqua therapy  and they do not wait for a month  They want to keep him going they do not want to lose momentum   the fax should of came yesterday,from [facility], if not please look out for it.     She said they just enrolled into [facility] as MCR part D and they do not cover Rand Cotter she would like to know if they can get any help at all to help reduce the cost, she has also applied to the Parkinson's foundation for help with the Rand Cotter,       If there is no helep to get the cost down for the capsules can they get a higher dosage for the Tablets for the carbidopa  levodopa    Christin Willard

## 2019-11-25 ENCOUNTER — TELEPHONE (OUTPATIENT)
Dept: NEUROLOGY | Age: 64
End: 2019-11-25

## 2019-11-25 NOTE — TELEPHONE ENCOUNTER
Pt's wife states that [facility] needs exact quantity of Rytary to fill for 2 wk supply that is covered by  coupon. Pt takes 2 caps daily so total for 14 days is 70 caps.   [employer] and verified quantity

## 2019-11-25 NOTE — TELEPHONE ENCOUNTER
Pt stated that my chart will not allow pt to send messages     The parkinson program will pay in full 2 week prescription of rytary   Or 70 caps   Pt state that she can present coupon and get medicine   Pt would like a call back from the nurse

## 2019-12-16 ENCOUNTER — TELEPHONE (OUTPATIENT)
Dept: NEUROLOGY | Age: 64
End: 2019-12-16

## 2019-12-16 DIAGNOSIS — G20 PARKINSONISM, UNSPECIFIED PARKINSONISM TYPE (HCC): ICD-10-CM

## 2019-12-16 DIAGNOSIS — F02.80 LEWY BODY DEMENTIA WITHOUT BEHAVIORAL DISTURBANCE (HCC): ICD-10-CM

## 2019-12-16 DIAGNOSIS — G31.83 LEWY BODY DEMENTIA WITHOUT BEHAVIORAL DISTURBANCE (HCC): ICD-10-CM

## 2019-12-16 NOTE — TELEPHONE ENCOUNTER
Mrs Gunjan Montoya calling in ref to [facility] needing a script for Lucas Eng  [phone] Fax# [phone] As soon as possible also requesting a call back in ref to all other refills  Best # [phone]

## 2019-12-19 NOTE — TELEPHONE ENCOUNTER
----- Message from Papito Rangel sent at 12/19/2019 11:54 AM EST -----  Regarding: Dr Vickey Miller  Pt's wife Teo Mar is calling to let nurse Horace Moser know that the pt has [address] [place] [address] this is where his medication needs to come from , please call [phone], for all medications , except Margoth Yarbrough has fax this requests over twice, if you have any questions please call wife at [phone].

## 2019-12-19 NOTE — TELEPHONE ENCOUNTER
S/w pt, notified that Mirapex, donepezil, memantine, trazodone, and Seroquel was sent to [facility]

## 2019-12-27 NOTE — TELEPHONE ENCOUNTER
Prior Auth APPROVED for Quetiapine 50mg by [facility] [employer] [facility]. Effective Dates 12/27/19 - 12/31/20. Case #(use patient ID). Approval will be scanned into media. Please send Rx to patient's preferred pharmacy, if necessary.  Called patient to inform them of approval.

## 2019-12-27 NOTE — TELEPHONE ENCOUNTER
Pt wife prashant toney stated that [employer] need a pa for   QUEtiapine (SEROQUEL) 50 mg tablet  Pt has a 2 weeks supply but will take 2 weeks for a refill

## 2019-12-27 NOTE — TELEPHONE ENCOUNTER
Prior Auth submitted URGENTLY for Quetiapine 50mg to [facility] [employer] [facility] via Cover My Meds. Status Pending. Allow 24 hours for response.      CMM Key: [id number]

## 2020-01-01 ENCOUNTER — APPOINTMENT (OUTPATIENT)
Dept: GENERAL RADIOLOGY | Age: 65
DRG: 871 | End: 2020-01-01
Attending: SURGERY
Payer: MEDICARE

## 2020-01-01 ENCOUNTER — OFFICE VISIT (OUTPATIENT)
Dept: NEUROLOGY | Age: 65
End: 2020-01-01
Payer: MEDICARE

## 2020-01-01 ENCOUNTER — HOSPICE ADMISSION (OUTPATIENT)
Dept: HOSPICE | Facility: HOSPICE | Age: 65
End: 2020-01-01
Payer: MEDICARE

## 2020-01-01 ENCOUNTER — OFFICE VISIT (OUTPATIENT)
Dept: NEUROLOGY | Age: 65
End: 2020-01-01

## 2020-01-01 ENCOUNTER — TELEPHONE (OUTPATIENT)
Dept: NEUROLOGY | Age: 65
End: 2020-01-01

## 2020-01-01 ENCOUNTER — APPOINTMENT (OUTPATIENT)
Dept: GENERAL RADIOLOGY | Age: 65
DRG: 871 | End: 2020-01-01
Attending: EMERGENCY MEDICINE
Payer: MEDICARE

## 2020-01-01 ENCOUNTER — HOSPITAL ENCOUNTER (INPATIENT)
Age: 65
LOS: 7 days | DRG: 056 | End: 2020-12-18
Attending: FAMILY MEDICINE | Admitting: FAMILY MEDICINE
Payer: OTHER MISCELLANEOUS

## 2020-01-01 ENCOUNTER — HOSPITAL ENCOUNTER (INPATIENT)
Age: 65
LOS: 8 days | Discharge: HOSPICE | DRG: 871 | End: 2020-12-11
Attending: EMERGENCY MEDICINE | Admitting: INTERNAL MEDICINE
Payer: MEDICARE

## 2020-01-01 ENCOUNTER — APPOINTMENT (OUTPATIENT)
Dept: GENERAL RADIOLOGY | Age: 65
DRG: 871 | End: 2020-01-01
Attending: NURSE PRACTITIONER
Payer: MEDICARE

## 2020-01-01 ENCOUNTER — VIRTUAL VISIT (OUTPATIENT)
Dept: NEUROLOGY | Age: 65
End: 2020-01-01

## 2020-01-01 ENCOUNTER — APPOINTMENT (OUTPATIENT)
Dept: GENERAL RADIOLOGY | Age: 65
DRG: 871 | End: 2020-01-01
Attending: INTERNAL MEDICINE
Payer: MEDICARE

## 2020-01-01 ENCOUNTER — APPOINTMENT (OUTPATIENT)
Dept: CT IMAGING | Age: 65
DRG: 871 | End: 2020-01-01
Attending: EMERGENCY MEDICINE
Payer: MEDICARE

## 2020-01-01 VITALS
SYSTOLIC BLOOD PRESSURE: 98 MMHG | WEIGHT: 156 LBS | BODY MASS INDEX: 21.84 KG/M2 | RESPIRATION RATE: 16 BRPM | DIASTOLIC BLOOD PRESSURE: 76 MMHG | OXYGEN SATURATION: 98 % | HEART RATE: 79 BPM | HEIGHT: 71 IN | TEMPERATURE: 97.4 F

## 2020-01-01 VITALS
SYSTOLIC BLOOD PRESSURE: 130 MMHG | WEIGHT: 160 LBS | HEIGHT: 71 IN | BODY MASS INDEX: 22.4 KG/M2 | DIASTOLIC BLOOD PRESSURE: 70 MMHG | RESPIRATION RATE: 20 BRPM

## 2020-01-01 VITALS
DIASTOLIC BLOOD PRESSURE: 77 MMHG | OXYGEN SATURATION: 92 % | BODY MASS INDEX: 25.69 KG/M2 | SYSTOLIC BLOOD PRESSURE: 113 MMHG | HEART RATE: 77 BPM | RESPIRATION RATE: 12 BRPM | HEIGHT: 67 IN | TEMPERATURE: 98.9 F | WEIGHT: 163.7 LBS

## 2020-01-01 VITALS
SYSTOLIC BLOOD PRESSURE: 77 MMHG | HEIGHT: 67 IN | BODY MASS INDEX: 25.71 KG/M2 | HEART RATE: 172 BPM | DIASTOLIC BLOOD PRESSURE: 50 MMHG | OXYGEN SATURATION: 83 % | TEMPERATURE: 101.3 F | WEIGHT: 163.8 LBS | RESPIRATION RATE: 24 BRPM

## 2020-01-01 DIAGNOSIS — G20 PARKINSONISM, UNSPECIFIED PARKINSONISM TYPE (HCC): ICD-10-CM

## 2020-01-01 DIAGNOSIS — G20 DEMENTIA DUE TO PARKINSON'S DISEASE WITHOUT BEHAVIORAL DISTURBANCE (HCC): Chronic | ICD-10-CM

## 2020-01-01 DIAGNOSIS — N17.9 AKI (ACUTE KIDNEY INJURY) (HCC): ICD-10-CM

## 2020-01-01 DIAGNOSIS — R26.89 BALANCE DISORDER: ICD-10-CM

## 2020-01-01 DIAGNOSIS — F02.80 DEMENTIA DUE TO PARKINSON'S DISEASE WITHOUT BEHAVIORAL DISTURBANCE (HCC): Chronic | ICD-10-CM

## 2020-01-01 DIAGNOSIS — F02.80 LEWY BODY DEMENTIA WITHOUT BEHAVIORAL DISTURBANCE (HCC): ICD-10-CM

## 2020-01-01 DIAGNOSIS — G31.83 LEWY BODY DEMENTIA WITHOUT BEHAVIORAL DISTURBANCE (HCC): Primary | ICD-10-CM

## 2020-01-01 DIAGNOSIS — R26.9 GAIT ABNORMALITY: ICD-10-CM

## 2020-01-01 DIAGNOSIS — R45.1 RESTLESSNESS AND AGITATION: ICD-10-CM

## 2020-01-01 DIAGNOSIS — U07.1 COVID-19: Primary | ICD-10-CM

## 2020-01-01 DIAGNOSIS — F02.80 LEWY BODY DEMENTIA WITHOUT BEHAVIORAL DISTURBANCE (HCC): Primary | ICD-10-CM

## 2020-01-01 DIAGNOSIS — G31.83 LEWY BODY DEMENTIA WITHOUT BEHAVIORAL DISTURBANCE (HCC): ICD-10-CM

## 2020-01-01 DIAGNOSIS — R06.89 IRREGULAR BREATHING PATTERN: ICD-10-CM

## 2020-01-01 DIAGNOSIS — R41.89 UNRESPONSIVE: ICD-10-CM

## 2020-01-01 DIAGNOSIS — R06.4 LABORED BREATHING: ICD-10-CM

## 2020-01-01 DIAGNOSIS — G20 PARKINSON'S DISEASE (HCC): ICD-10-CM

## 2020-01-01 DIAGNOSIS — R09.02 HYPOXIA: ICD-10-CM

## 2020-01-01 DIAGNOSIS — R45.1 RESTLESSNESS: ICD-10-CM

## 2020-01-01 DIAGNOSIS — R45.1 RESTLESSNESS AND AGITATION: Primary | ICD-10-CM

## 2020-01-01 DIAGNOSIS — Z51.5 HOSPICE CARE: ICD-10-CM

## 2020-01-01 DIAGNOSIS — R52 GENERALIZED PAIN: ICD-10-CM

## 2020-01-01 LAB
ALBUMIN SERPL-MCNC: 3 G/DL (ref 3.5–5)
ALBUMIN SERPL-MCNC: 3 G/DL (ref 3.5–5)
ALBUMIN SERPL-MCNC: 3.1 G/DL (ref 3.5–5)
ALBUMIN SERPL-MCNC: 3.2 G/DL (ref 3.5–5)
ALBUMIN SERPL-MCNC: 3.4 G/DL (ref 3.5–5)
ALBUMIN SERPL-MCNC: 3.5 G/DL (ref 3.5–5)
ALBUMIN/GLOB SERPL: 0.8 {RATIO} (ref 1.1–2.2)
ALP SERPL-CCNC: 86 U/L (ref 45–117)
ALT SERPL-CCNC: 28 U/L (ref 12–78)
ANION GAP SERPL CALC-SCNC: 12 MMOL/L (ref 5–15)
ANION GAP SERPL CALC-SCNC: 5 MMOL/L (ref 5–15)
ANION GAP SERPL CALC-SCNC: 7 MMOL/L (ref 5–15)
ANION GAP SERPL CALC-SCNC: 8 MMOL/L (ref 5–15)
APPEARANCE UR: CLEAR
AST SERPL-CCNC: 23 U/L (ref 15–37)
ATRIAL RATE: 100 BPM
ATRIAL RATE: 277 BPM
BACTERIA SPEC CULT: ABNORMAL
BACTERIA SPEC CULT: NORMAL
BACTERIA URNS QL MICRO: ABNORMAL /HPF
BASOPHILS # BLD: 0 K/UL (ref 0–0.1)
BASOPHILS NFR BLD: 0 % (ref 0–1)
BILIRUB SERPL-MCNC: 0.7 MG/DL (ref 0.2–1)
BILIRUB UR QL: NEGATIVE
BNP SERPL-MCNC: 730 PG/ML
BUN SERPL-MCNC: 20 MG/DL (ref 6–20)
BUN SERPL-MCNC: 21 MG/DL (ref 6–20)
BUN SERPL-MCNC: 22 MG/DL (ref 6–20)
BUN SERPL-MCNC: 28 MG/DL (ref 6–20)
BUN SERPL-MCNC: 30 MG/DL (ref 6–20)
BUN SERPL-MCNC: 60 MG/DL (ref 6–20)
BUN/CREAT SERPL: 20 (ref 12–20)
BUN/CREAT SERPL: 26 (ref 12–20)
BUN/CREAT SERPL: 29 (ref 12–20)
BUN/CREAT SERPL: 30 (ref 12–20)
BUN/CREAT SERPL: 31 (ref 12–20)
BUN/CREAT SERPL: 35 (ref 12–20)
CALCIUM SERPL-MCNC: 7.8 MG/DL (ref 8.5–10.1)
CALCIUM SERPL-MCNC: 8 MG/DL (ref 8.5–10.1)
CALCIUM SERPL-MCNC: 8.3 MG/DL (ref 8.5–10.1)
CALCIUM SERPL-MCNC: 8.4 MG/DL (ref 8.5–10.1)
CALCIUM SERPL-MCNC: 8.5 MG/DL (ref 8.5–10.1)
CALCIUM SERPL-MCNC: 8.7 MG/DL (ref 8.5–10.1)
CALCULATED P AXIS, ECG09: 77 DEGREES
CALCULATED R AXIS, ECG10: 64 DEGREES
CALCULATED R AXIS, ECG10: 9 DEGREES
CALCULATED T AXIS, ECG11: -154 DEGREES
CALCULATED T AXIS, ECG11: 58 DEGREES
CHLORIDE SERPL-SCNC: 109 MMOL/L (ref 97–108)
CHLORIDE SERPL-SCNC: 114 MMOL/L (ref 97–108)
CHLORIDE SERPL-SCNC: 119 MMOL/L (ref 97–108)
CHLORIDE SERPL-SCNC: 123 MMOL/L (ref 97–108)
CHLORIDE SERPL-SCNC: 124 MMOL/L (ref 97–108)
CHLORIDE SERPL-SCNC: 126 MMOL/L (ref 97–108)
CK SERPL-CCNC: 47 U/L (ref 39–308)
CK SERPL-CCNC: 73 U/L (ref 39–308)
CK SERPL-CCNC: 80 U/L (ref 39–308)
CK SERPL-CCNC: 81 U/L (ref 39–308)
CO2 SERPL-SCNC: 18 MMOL/L (ref 21–32)
CO2 SERPL-SCNC: 24 MMOL/L (ref 21–32)
CO2 SERPL-SCNC: 24 MMOL/L (ref 21–32)
CO2 SERPL-SCNC: 25 MMOL/L (ref 21–32)
COLOR UR: ABNORMAL
COVID-19 RAPID TEST, COVR: DETECTED
CREAT SERPL-MCNC: 0.63 MG/DL (ref 0.7–1.3)
CREAT SERPL-MCNC: 0.72 MG/DL (ref 0.7–1.3)
CREAT SERPL-MCNC: 0.76 MG/DL (ref 0.7–1.3)
CREAT SERPL-MCNC: 0.89 MG/DL (ref 0.7–1.3)
CREAT SERPL-MCNC: 1 MG/DL (ref 0.7–1.3)
CREAT SERPL-MCNC: 2.93 MG/DL (ref 0.7–1.3)
CRP SERPL-MCNC: 4.3 MG/DL (ref 0–0.6)
D DIMER PPP FEU-MCNC: 16.35 MG/L FEU (ref 0–0.65)
D DIMER PPP FEU-MCNC: 17.58 MG/L FEU (ref 0–0.65)
DATE LAST DOSE: ABNORMAL
DATE LAST DOSE: NORMAL
DIAGNOSIS, 93000: NORMAL
DIAGNOSIS, 93000: NORMAL
DIFFERENTIAL METHOD BLD: ABNORMAL
EOSINOPHIL # BLD: 0 K/UL (ref 0–0.4)
EOSINOPHIL # BLD: 0 K/UL (ref 0–0.4)
EOSINOPHIL # BLD: 0.1 K/UL (ref 0–0.4)
EOSINOPHIL # BLD: 0.4 K/UL (ref 0–0.4)
EOSINOPHIL NFR BLD: 0 % (ref 0–7)
EOSINOPHIL NFR BLD: 0 % (ref 0–7)
EOSINOPHIL NFR BLD: 1 % (ref 0–7)
EOSINOPHIL NFR BLD: 3 % (ref 0–7)
EPITH CASTS URNS QL MICRO: ABNORMAL /LPF
ERYTHROCYTE [DISTWIDTH] IN BLOOD BY AUTOMATED COUNT: 12.6 % (ref 11.5–14.5)
ERYTHROCYTE [DISTWIDTH] IN BLOOD BY AUTOMATED COUNT: 13.1 % (ref 11.5–14.5)
ERYTHROCYTE [DISTWIDTH] IN BLOOD BY AUTOMATED COUNT: 13.5 % (ref 11.5–14.5)
ERYTHROCYTE [DISTWIDTH] IN BLOOD BY AUTOMATED COUNT: 13.5 % (ref 11.5–14.5)
ERYTHROCYTE [SEDIMENTATION RATE] IN BLOOD: 18 MM/HR (ref 0–20)
GLOBULIN SER CALC-MCNC: 3.6 G/DL (ref 2–4)
GLUCOSE BLD STRIP.AUTO-MCNC: 102 MG/DL (ref 65–100)
GLUCOSE BLD STRIP.AUTO-MCNC: 102 MG/DL (ref 65–100)
GLUCOSE BLD STRIP.AUTO-MCNC: 106 MG/DL (ref 65–100)
GLUCOSE BLD STRIP.AUTO-MCNC: 108 MG/DL (ref 65–100)
GLUCOSE BLD STRIP.AUTO-MCNC: 114 MG/DL (ref 65–100)
GLUCOSE BLD STRIP.AUTO-MCNC: 115 MG/DL (ref 65–100)
GLUCOSE BLD STRIP.AUTO-MCNC: 115 MG/DL (ref 65–100)
GLUCOSE BLD STRIP.AUTO-MCNC: 117 MG/DL (ref 65–100)
GLUCOSE BLD STRIP.AUTO-MCNC: 118 MG/DL (ref 65–100)
GLUCOSE BLD STRIP.AUTO-MCNC: 119 MG/DL (ref 65–100)
GLUCOSE BLD STRIP.AUTO-MCNC: 123 MG/DL (ref 65–100)
GLUCOSE BLD STRIP.AUTO-MCNC: 141 MG/DL (ref 65–100)
GLUCOSE BLD STRIP.AUTO-MCNC: 146 MG/DL (ref 65–100)
GLUCOSE BLD STRIP.AUTO-MCNC: 83 MG/DL (ref 65–100)
GLUCOSE BLD STRIP.AUTO-MCNC: 84 MG/DL (ref 65–100)
GLUCOSE BLD STRIP.AUTO-MCNC: 92 MG/DL (ref 65–100)
GLUCOSE BLD STRIP.AUTO-MCNC: 98 MG/DL (ref 65–100)
GLUCOSE BLD STRIP.AUTO-MCNC: 99 MG/DL (ref 65–100)
GLUCOSE BLD STRIP.AUTO-MCNC: 99 MG/DL (ref 65–100)
GLUCOSE SERPL-MCNC: 118 MG/DL (ref 65–100)
GLUCOSE SERPL-MCNC: 125 MG/DL (ref 65–100)
GLUCOSE SERPL-MCNC: 188 MG/DL (ref 65–100)
GLUCOSE SERPL-MCNC: 96 MG/DL (ref 65–100)
GLUCOSE SERPL-MCNC: 98 MG/DL (ref 65–100)
GLUCOSE SERPL-MCNC: 99 MG/DL (ref 65–100)
GLUCOSE UR STRIP.AUTO-MCNC: NEGATIVE MG/DL
GRAM STN SPEC: ABNORMAL
HCT VFR BLD AUTO: 31.7 % (ref 36.6–50.3)
HCT VFR BLD AUTO: 32.3 % (ref 36.6–50.3)
HCT VFR BLD AUTO: 33.8 % (ref 36.6–50.3)
HCT VFR BLD AUTO: 42.7 % (ref 36.6–50.3)
HGB BLD-MCNC: 10.4 G/DL (ref 12.1–17)
HGB BLD-MCNC: 10.4 G/DL (ref 12.1–17)
HGB BLD-MCNC: 11.2 G/DL (ref 12.1–17)
HGB BLD-MCNC: 13.8 G/DL (ref 12.1–17)
HGB UR QL STRIP: NEGATIVE
IMM GRANULOCYTES # BLD AUTO: 0 K/UL (ref 0–0.04)
IMM GRANULOCYTES # BLD AUTO: 0 K/UL (ref 0–0.04)
IMM GRANULOCYTES # BLD AUTO: 0.1 K/UL (ref 0–0.04)
IMM GRANULOCYTES # BLD AUTO: 0.1 K/UL (ref 0–0.04)
IMM GRANULOCYTES NFR BLD AUTO: 0 % (ref 0–0.5)
IMM GRANULOCYTES NFR BLD AUTO: 0 % (ref 0–0.5)
IMM GRANULOCYTES NFR BLD AUTO: 1 % (ref 0–0.5)
IMM GRANULOCYTES NFR BLD AUTO: 1 % (ref 0–0.5)
INR PPP: 1.1 (ref 0.9–1.1)
INR PPP: 1.2 (ref 0.9–1.1)
INR PPP: 1.3 (ref 0.9–1.1)
INR PPP: 1.4 (ref 0.9–1.1)
KETONES UR QL STRIP.AUTO: ABNORMAL MG/DL
LACTATE SERPL-SCNC: 1 MMOL/L (ref 0.4–2)
LACTATE SERPL-SCNC: 1.2 MMOL/L (ref 0.4–2)
LACTATE SERPL-SCNC: 1.3 MMOL/L (ref 0.4–2)
LACTATE SERPL-SCNC: 1.4 MMOL/L (ref 0.4–2)
LEUKOCYTE ESTERASE UR QL STRIP.AUTO: ABNORMAL
LYMPHOCYTES # BLD: 1 K/UL (ref 0.8–3.5)
LYMPHOCYTES # BLD: 1 K/UL (ref 0.8–3.5)
LYMPHOCYTES # BLD: 1.3 K/UL (ref 0.8–3.5)
LYMPHOCYTES # BLD: 1.7 K/UL (ref 0.8–3.5)
LYMPHOCYTES NFR BLD: 10 % (ref 12–49)
LYMPHOCYTES NFR BLD: 13 % (ref 12–49)
LYMPHOCYTES NFR BLD: 9 % (ref 12–49)
LYMPHOCYTES NFR BLD: 9 % (ref 12–49)
MAGNESIUM SERPL-MCNC: 2.1 MG/DL (ref 1.6–2.4)
MAGNESIUM SERPL-MCNC: 2.1 MG/DL (ref 1.6–2.4)
MAGNESIUM SERPL-MCNC: 2.2 MG/DL (ref 1.6–2.4)
MAGNESIUM SERPL-MCNC: 2.2 MG/DL (ref 1.6–2.4)
MAGNESIUM SERPL-MCNC: 2.8 MG/DL (ref 1.6–2.4)
MCH RBC QN AUTO: 30.6 PG (ref 26–34)
MCH RBC QN AUTO: 30.6 PG (ref 26–34)
MCH RBC QN AUTO: 31 PG (ref 26–34)
MCH RBC QN AUTO: 31.4 PG (ref 26–34)
MCHC RBC AUTO-ENTMCNC: 32.2 G/DL (ref 30–36.5)
MCHC RBC AUTO-ENTMCNC: 32.3 G/DL (ref 30–36.5)
MCHC RBC AUTO-ENTMCNC: 32.8 G/DL (ref 30–36.5)
MCHC RBC AUTO-ENTMCNC: 33.1 G/DL (ref 30–36.5)
MCV RBC AUTO: 93.6 FL (ref 80–99)
MCV RBC AUTO: 94.7 FL (ref 80–99)
MCV RBC AUTO: 95 FL (ref 80–99)
MCV RBC AUTO: 95.8 FL (ref 80–99)
MONOCYTES # BLD: 0.3 K/UL (ref 0–1)
MONOCYTES # BLD: 0.3 K/UL (ref 0–1)
MONOCYTES # BLD: 0.5 K/UL (ref 0–1)
MONOCYTES # BLD: 0.8 K/UL (ref 0–1)
MONOCYTES NFR BLD: 3 % (ref 5–13)
MONOCYTES NFR BLD: 3 % (ref 5–13)
MONOCYTES NFR BLD: 4 % (ref 5–13)
MONOCYTES NFR BLD: 7 % (ref 5–13)
MUCOUS THREADS URNS QL MICRO: ABNORMAL /LPF
NEUTS SEG # BLD: 10 K/UL (ref 1.8–8)
NEUTS SEG # BLD: 10.9 K/UL (ref 1.8–8)
NEUTS SEG # BLD: 9.3 K/UL (ref 1.8–8)
NEUTS SEG # BLD: 9.4 K/UL (ref 1.8–8)
NEUTS SEG NFR BLD: 76 % (ref 32–75)
NEUTS SEG NFR BLD: 86 % (ref 32–75)
NEUTS SEG NFR BLD: 86 % (ref 32–75)
NEUTS SEG NFR BLD: 88 % (ref 32–75)
NITRITE UR QL STRIP.AUTO: NEGATIVE
NRBC # BLD: 0 K/UL (ref 0–0.01)
NRBC BLD-RTO: 0 PER 100 WBC
P-R INTERVAL, ECG05: 134 MS
PH UR STRIP: 6.5 [PH] (ref 5–8)
PHOSPHATE SERPL-MCNC: 1.8 MG/DL (ref 2.6–4.7)
PHOSPHATE SERPL-MCNC: 1.9 MG/DL (ref 2.6–4.7)
PHOSPHATE SERPL-MCNC: 2.6 MG/DL (ref 2.6–4.7)
PHOSPHATE SERPL-MCNC: 3 MG/DL (ref 2.6–4.7)
PHOSPHATE SERPL-MCNC: 3.7 MG/DL (ref 2.6–4.7)
PLATELET # BLD AUTO: 129 K/UL (ref 150–400)
PLATELET # BLD AUTO: 133 K/UL (ref 150–400)
PLATELET # BLD AUTO: 148 K/UL (ref 150–400)
PLATELET # BLD AUTO: 198 K/UL (ref 150–400)
PMV BLD AUTO: 10.1 FL (ref 8.9–12.9)
PMV BLD AUTO: 10.7 FL (ref 8.9–12.9)
PMV BLD AUTO: 11.1 FL (ref 8.9–12.9)
PMV BLD AUTO: 9.9 FL (ref 8.9–12.9)
POTASSIUM SERPL-SCNC: 3.1 MMOL/L (ref 3.5–5.1)
POTASSIUM SERPL-SCNC: 3.4 MMOL/L (ref 3.5–5.1)
POTASSIUM SERPL-SCNC: 3.5 MMOL/L (ref 3.5–5.1)
POTASSIUM SERPL-SCNC: 3.8 MMOL/L (ref 3.5–5.1)
POTASSIUM SERPL-SCNC: 4 MMOL/L (ref 3.5–5.1)
POTASSIUM SERPL-SCNC: 4 MMOL/L (ref 3.5–5.1)
PREALB SERPL-MCNC: 9.8 MG/DL (ref 20–40)
PROCALCITONIN SERPL-MCNC: 0.65 NG/ML
PROT SERPL-MCNC: 6.6 G/DL (ref 6.4–8.2)
PROT UR STRIP-MCNC: ABNORMAL MG/DL
PROTHROMBIN TIME: 11.9 SEC (ref 9–11.1)
PROTHROMBIN TIME: 12.4 SEC (ref 9–11.1)
PROTHROMBIN TIME: 13.2 SEC (ref 9–11.1)
PROTHROMBIN TIME: 14.6 SEC (ref 9–11.1)
Q-T INTERVAL, ECG07: 240 MS
Q-T INTERVAL, ECG07: 348 MS
QRS DURATION, ECG06: 86 MS
QRS DURATION, ECG06: 88 MS
QTC CALCULATION (BEZET), ECG08: 418 MS
QTC CALCULATION (BEZET), ECG08: 448 MS
RBC # BLD AUTO: 3.31 M/UL (ref 4.1–5.7)
RBC # BLD AUTO: 3.4 M/UL (ref 4.1–5.7)
RBC # BLD AUTO: 3.61 M/UL (ref 4.1–5.7)
RBC # BLD AUTO: 4.51 M/UL (ref 4.1–5.7)
RBC #/AREA URNS HPF: ABNORMAL /HPF (ref 0–5)
REPORTED DOSE,DOSE: ABNORMAL UNITS
REPORTED DOSE,DOSE: NORMAL UNITS
REPORTED DOSE/TIME,TMG: ABNORMAL
REPORTED DOSE/TIME,TMG: NORMAL
SERVICE CMNT-IMP: ABNORMAL
SERVICE CMNT-IMP: NORMAL
SODIUM SERPL-SCNC: 141 MMOL/L (ref 136–145)
SODIUM SERPL-SCNC: 146 MMOL/L (ref 136–145)
SODIUM SERPL-SCNC: 151 MMOL/L (ref 136–145)
SODIUM SERPL-SCNC: 154 MMOL/L (ref 136–145)
SODIUM SERPL-SCNC: 154 MMOL/L (ref 136–145)
SODIUM SERPL-SCNC: 156 MMOL/L (ref 136–145)
SOURCE, COVRS: ABNORMAL
SP GR UR REFRACTOMETRY: 1.02 (ref 1–1.03)
SPECIMEN SOURCE, FCOV2M: ABNORMAL
SPECIMEN TYPE, XMCV1T: ABNORMAL
TRANSFERRIN SERPL-MCNC: 92 MG/DL (ref 177–329)
TROPONIN I SERPL-MCNC: <0.05 NG/ML
TROPONIN I SERPL-MCNC: <0.05 NG/ML
UROBILINOGEN UR QL STRIP.AUTO: 1 EU/DL (ref 0.2–1)
VANCOMYCIN TROUGH SERPL-MCNC: 3.7 UG/ML (ref 5–10)
VANCOMYCIN TROUGH SERPL-MCNC: 9.9 UG/ML (ref 5–10)
VENTRICULAR RATE, ECG03: 100 BPM
VENTRICULAR RATE, ECG03: 183 BPM
WBC # BLD AUTO: 10.8 K/UL (ref 4.1–11.1)
WBC # BLD AUTO: 11.4 K/UL (ref 4.1–11.1)
WBC # BLD AUTO: 12.4 K/UL (ref 4.1–11.1)
WBC # BLD AUTO: 12.7 K/UL (ref 4.1–11.1)
WBC URNS QL MICRO: ABNORMAL /HPF (ref 0–4)

## 2020-01-01 PROCEDURE — 65660000001 HC RM ICU INTERMED STEPDOWN

## 2020-01-01 PROCEDURE — 80048 BASIC METABOLIC PNL TOTAL CA: CPT

## 2020-01-01 PROCEDURE — 74011250636 HC RX REV CODE- 250/636: Performed by: INTERNAL MEDICINE

## 2020-01-01 PROCEDURE — 74011250636 HC RX REV CODE- 250/636: Performed by: NURSE PRACTITIONER

## 2020-01-01 PROCEDURE — 82040 ASSAY OF SERUM ALBUMIN: CPT

## 2020-01-01 PROCEDURE — 82962 GLUCOSE BLOOD TEST: CPT

## 2020-01-01 PROCEDURE — 83735 ASSAY OF MAGNESIUM: CPT

## 2020-01-01 PROCEDURE — 74011000250 HC RX REV CODE- 250: Performed by: NURSE PRACTITIONER

## 2020-01-01 PROCEDURE — 74011250637 HC RX REV CODE- 250/637: Performed by: NURSE PRACTITIONER

## 2020-01-01 PROCEDURE — 74011250636 HC RX REV CODE- 250/636: Performed by: SURGERY

## 2020-01-01 PROCEDURE — 84100 ASSAY OF PHOSPHORUS: CPT

## 2020-01-01 PROCEDURE — 87070 CULTURE OTHR SPECIMN AEROBIC: CPT

## 2020-01-01 PROCEDURE — 85652 RBC SED RATE AUTOMATED: CPT

## 2020-01-01 PROCEDURE — 74011250637 HC RX REV CODE- 250/637: Performed by: INTERNAL MEDICINE

## 2020-01-01 PROCEDURE — 77010033711 HC HIGH FLOW OXYGEN

## 2020-01-01 PROCEDURE — 83605 ASSAY OF LACTIC ACID: CPT

## 2020-01-01 PROCEDURE — 65610000006 HC RM INTENSIVE CARE

## 2020-01-01 PROCEDURE — 65270000029 HC RM PRIVATE

## 2020-01-01 PROCEDURE — 85379 FIBRIN DEGRADATION QUANT: CPT

## 2020-01-01 PROCEDURE — 82550 ASSAY OF CK (CPK): CPT

## 2020-01-01 PROCEDURE — G8420 CALC BMI NORM PARAMETERS: HCPCS | Performed by: PSYCHIATRY & NEUROLOGY

## 2020-01-01 PROCEDURE — 99232 SBSQ HOSP IP/OBS MODERATE 35: CPT | Performed by: FAMILY MEDICINE

## 2020-01-01 PROCEDURE — 94640 AIRWAY INHALATION TREATMENT: CPT

## 2020-01-01 PROCEDURE — 85025 COMPLETE CBC W/AUTO DIFF WBC: CPT

## 2020-01-01 PROCEDURE — 74011250636 HC RX REV CODE- 250/636: Performed by: FAMILY MEDICINE

## 2020-01-01 PROCEDURE — 99285 EMERGENCY DEPT VISIT HI MDM: CPT

## 2020-01-01 PROCEDURE — 85610 PROTHROMBIN TIME: CPT

## 2020-01-01 PROCEDURE — 93005 ELECTROCARDIOGRAM TRACING: CPT

## 2020-01-01 PROCEDURE — 94669 MECHANICAL CHEST WALL OSCILL: CPT

## 2020-01-01 PROCEDURE — 36415 COLL VENOUS BLD VENIPUNCTURE: CPT

## 2020-01-01 PROCEDURE — 99233 SBSQ HOSP IP/OBS HIGH 50: CPT | Performed by: FAMILY MEDICINE

## 2020-01-01 PROCEDURE — 71045 X-RAY EXAM CHEST 1 VIEW: CPT

## 2020-01-01 PROCEDURE — 94664 DEMO&/EVAL PT USE INHALER: CPT

## 2020-01-01 PROCEDURE — 0656 HSPC GENERAL INPATIENT

## 2020-01-01 PROCEDURE — 80202 ASSAY OF VANCOMYCIN: CPT

## 2020-01-01 PROCEDURE — G8536 NO DOC ELDER MAL SCRN: HCPCS | Performed by: PSYCHIATRY & NEUROLOGY

## 2020-01-01 PROCEDURE — 74011000258 HC RX REV CODE- 258: Performed by: NURSE PRACTITIONER

## 2020-01-01 PROCEDURE — C1751 CATH, INF, PER/CENT/MIDLINE: HCPCS

## 2020-01-01 PROCEDURE — 70450 CT HEAD/BRAIN W/O DYE: CPT

## 2020-01-01 PROCEDURE — 99223 1ST HOSP IP/OBS HIGH 75: CPT | Performed by: FAMILY MEDICINE

## 2020-01-01 PROCEDURE — 36556 INSERT NON-TUNNEL CV CATH: CPT

## 2020-01-01 PROCEDURE — 74011250637 HC RX REV CODE- 250/637: Performed by: SURGERY

## 2020-01-01 PROCEDURE — 84145 PROCALCITONIN (PCT): CPT

## 2020-01-01 PROCEDURE — 96365 THER/PROPH/DIAG IV INF INIT: CPT

## 2020-01-01 PROCEDURE — 84484 ASSAY OF TROPONIN QUANT: CPT

## 2020-01-01 PROCEDURE — 74011000258 HC RX REV CODE- 258: Performed by: HOSPITALIST

## 2020-01-01 PROCEDURE — 92610 EVALUATE SWALLOWING FUNCTION: CPT

## 2020-01-01 PROCEDURE — 02HV33Z INSERTION OF INFUSION DEVICE INTO SUPERIOR VENA CAVA, PERCUTANEOUS APPROACH: ICD-10-PCS | Performed by: NURSE PRACTITIONER

## 2020-01-01 PROCEDURE — 74011000250 HC RX REV CODE- 250: Performed by: FAMILY MEDICINE

## 2020-01-01 PROCEDURE — G8427 DOCREV CUR MEDS BY ELIG CLIN: HCPCS | Performed by: PSYCHIATRY & NEUROLOGY

## 2020-01-01 PROCEDURE — 74018 RADEX ABDOMEN 1 VIEW: CPT

## 2020-01-01 PROCEDURE — P9045 ALBUMIN (HUMAN), 5%, 250 ML: HCPCS | Performed by: SURGERY

## 2020-01-01 PROCEDURE — 80053 COMPREHEN METABOLIC PANEL: CPT

## 2020-01-01 PROCEDURE — 81001 URINALYSIS AUTO W/SCOPE: CPT

## 2020-01-01 PROCEDURE — 94761 N-INVAS EAR/PLS OXIMETRY MLT: CPT

## 2020-01-01 PROCEDURE — 74011000250 HC RX REV CODE- 250: Performed by: INTERNAL MEDICINE

## 2020-01-01 PROCEDURE — P9045 ALBUMIN (HUMAN), 5%, 250 ML: HCPCS | Performed by: NURSE PRACTITIONER

## 2020-01-01 PROCEDURE — 74011250637 HC RX REV CODE- 250/637: Performed by: FAMILY MEDICINE

## 2020-01-01 PROCEDURE — 77030012341 HC CHMB SPCR OPTC MDI VYRM -A

## 2020-01-01 PROCEDURE — 99214 OFFICE O/P EST MOD 30 MIN: CPT | Performed by: PSYCHIATRY & NEUROLOGY

## 2020-01-01 PROCEDURE — 83880 ASSAY OF NATRIURETIC PEPTIDE: CPT

## 2020-01-01 PROCEDURE — 94760 N-INVAS EAR/PLS OXIMETRY 1: CPT

## 2020-01-01 PROCEDURE — 74011000258 HC RX REV CODE- 258: Performed by: EMERGENCY MEDICINE

## 2020-01-01 PROCEDURE — 94667 MNPJ CHEST WALL 1ST: CPT

## 2020-01-01 PROCEDURE — G8510 SCR DEP NEG, NO PLAN REQD: HCPCS | Performed by: PSYCHIATRY & NEUROLOGY

## 2020-01-01 PROCEDURE — 1101F PT FALLS ASSESS-DOCD LE1/YR: CPT | Performed by: PSYCHIATRY & NEUROLOGY

## 2020-01-01 PROCEDURE — 99233 SBSQ HOSP IP/OBS HIGH 50: CPT | Performed by: INTERNAL MEDICINE

## 2020-01-01 PROCEDURE — 74011000250 HC RX REV CODE- 250: Performed by: SURGERY

## 2020-01-01 PROCEDURE — 3336500001 HSPC ELECTION

## 2020-01-01 PROCEDURE — 96375 TX/PRO/DX INJ NEW DRUG ADDON: CPT

## 2020-01-01 PROCEDURE — 3017F COLORECTAL CA SCREEN DOC REV: CPT | Performed by: PSYCHIATRY & NEUROLOGY

## 2020-01-01 PROCEDURE — 0DH67UZ INSERTION OF FEEDING DEVICE INTO STOMACH, VIA NATURAL OR ARTIFICIAL OPENING: ICD-10-PCS | Performed by: NURSE PRACTITIONER

## 2020-01-01 PROCEDURE — 84466 ASSAY OF TRANSFERRIN: CPT

## 2020-01-01 PROCEDURE — 74011250637 HC RX REV CODE- 250/637: Performed by: EMERGENCY MEDICINE

## 2020-01-01 PROCEDURE — 87635 SARS-COV-2 COVID-19 AMP PRB: CPT

## 2020-01-01 PROCEDURE — 86140 C-REACTIVE PROTEIN: CPT

## 2020-01-01 PROCEDURE — 3E0G76Z INTRODUCTION OF NUTRITIONAL SUBSTANCE INTO UPPER GI, VIA NATURAL OR ARTIFICIAL OPENING: ICD-10-PCS | Performed by: NURSE PRACTITIONER

## 2020-01-01 PROCEDURE — 87040 BLOOD CULTURE FOR BACTERIA: CPT

## 2020-01-01 PROCEDURE — 74011250636 HC RX REV CODE- 250/636: Performed by: EMERGENCY MEDICINE

## 2020-01-01 PROCEDURE — 84134 ASSAY OF PREALBUMIN: CPT

## 2020-01-01 PROCEDURE — 71250 CT THORAX DX C-: CPT

## 2020-01-01 RX ORDER — ACETAMINOPHEN 650 MG/1
SUPPOSITORY RECTAL
Status: DISPENSED
Start: 2020-01-01 | End: 2020-01-01

## 2020-01-01 RX ORDER — TRAZODONE HYDROCHLORIDE 50 MG/1
50 TABLET ORAL 2 TIMES DAILY
Status: ON HOLD | COMMUNITY
End: 2020-01-01

## 2020-01-01 RX ORDER — DEXAMETHASONE SODIUM PHOSPHATE 10 MG/ML
INJECTION INTRAMUSCULAR; INTRAVENOUS
Status: DISPENSED
Start: 2020-01-01 | End: 2020-01-01

## 2020-01-01 RX ORDER — LEVODOPA AND CARBIDOPA 195; 48.75 MG/1; MG/1
1 CAPSULE, EXTENDED RELEASE ORAL
COMMUNITY
End: 2020-01-01

## 2020-01-01 RX ORDER — PRAMIPEXOLE DIHYDROCHLORIDE 1 MG/1
1 TABLET ORAL 3 TIMES DAILY
COMMUNITY
End: 2020-01-01

## 2020-01-01 RX ORDER — PAROXETINE 10 MG/1
15 TABLET, FILM COATED ORAL DAILY
COMMUNITY
End: 2020-01-01

## 2020-01-01 RX ORDER — ENOXAPARIN SODIUM 100 MG/ML
36 INJECTION SUBCUTANEOUS EVERY 12 HOURS
Status: DISCONTINUED | OUTPATIENT
Start: 2020-01-01 | End: 2020-01-01

## 2020-01-01 RX ORDER — MAGNESIUM SULFATE 100 %
4 CRYSTALS MISCELLANEOUS AS NEEDED
Status: DISCONTINUED | OUTPATIENT
Start: 2020-01-01 | End: 2020-01-01

## 2020-01-01 RX ORDER — ZINC SULFATE 50(220)MG
1 CAPSULE ORAL DAILY
Status: DISCONTINUED | OUTPATIENT
Start: 2020-01-01 | End: 2020-01-01

## 2020-01-01 RX ORDER — ALBUTEROL SULFATE 90 UG/1
2 AEROSOL, METERED RESPIRATORY (INHALATION)
Status: DISCONTINUED | OUTPATIENT
Start: 2020-01-01 | End: 2020-01-01 | Stop reason: HOSPADM

## 2020-01-01 RX ORDER — DIGOXIN 0.25 MG/ML
125 INJECTION INTRAMUSCULAR; INTRAVENOUS
Status: DISCONTINUED | OUTPATIENT
Start: 2020-01-01 | End: 2020-01-01

## 2020-01-01 RX ORDER — SODIUM CHLORIDE 0.9 % (FLUSH) 0.9 %
5-40 SYRINGE (ML) INJECTION EVERY 8 HOURS
Status: DISCONTINUED | OUTPATIENT
Start: 2020-01-01 | End: 2020-01-01 | Stop reason: HOSPADM

## 2020-01-01 RX ORDER — FACIAL-BODY WIPES
10 EACH TOPICAL DAILY PRN
Status: DISCONTINUED | OUTPATIENT
Start: 2020-01-01 | End: 2020-01-01 | Stop reason: HOSPADM

## 2020-01-01 RX ORDER — CYANOCOBALAMIN (VITAMIN B-12) 500 MCG
2000 TABLET ORAL DAILY
Status: DISCONTINUED | OUTPATIENT
Start: 2020-01-01 | End: 2020-01-01

## 2020-01-01 RX ORDER — GLYCOPYRROLATE 0.2 MG/ML
0.2 INJECTION INTRAMUSCULAR; INTRAVENOUS
Status: DISCONTINUED | OUTPATIENT
Start: 2020-01-01 | End: 2020-01-01 | Stop reason: HOSPADM

## 2020-01-01 RX ORDER — AZITHROMYCIN 100 MG/ML
INJECTION, POWDER, LYOPHILIZED, FOR SOLUTION INTRAVENOUS
Status: DISPENSED
Start: 2020-01-01 | End: 2020-01-01

## 2020-01-01 RX ORDER — VANCOMYCIN HYDROCHLORIDE
1250 EVERY 12 HOURS
Status: DISCONTINUED | OUTPATIENT
Start: 2020-01-01 | End: 2020-01-01

## 2020-01-01 RX ORDER — TRAMADOL HYDROCHLORIDE 50 MG/1
50 TABLET ORAL
Qty: 30 TAB | Refills: 1 | Status: SHIPPED | OUTPATIENT
Start: 2020-01-01 | End: 2020-01-01

## 2020-01-01 RX ORDER — DEXAMETHASONE SODIUM PHOSPHATE 4 MG/ML
2 INJECTION, SOLUTION INTRA-ARTICULAR; INTRALESIONAL; INTRAMUSCULAR; INTRAVENOUS; SOFT TISSUE ONCE
Status: COMPLETED | OUTPATIENT
Start: 2020-01-01 | End: 2020-01-01

## 2020-01-01 RX ORDER — ALBUMIN HUMAN 50 G/1000ML
50 SOLUTION INTRAVENOUS ONCE
Status: COMPLETED | OUTPATIENT
Start: 2020-01-01 | End: 2020-01-01

## 2020-01-01 RX ORDER — POTASSIUM CHLORIDE 14.9 MG/ML
10 INJECTION INTRAVENOUS
Status: COMPLETED | OUTPATIENT
Start: 2020-01-01 | End: 2020-01-01

## 2020-01-01 RX ORDER — LORAZEPAM 2 MG/ML
1 INJECTION INTRAMUSCULAR
Status: DISCONTINUED | OUTPATIENT
Start: 2020-01-01 | End: 2020-01-01 | Stop reason: HOSPADM

## 2020-01-01 RX ORDER — PRAMIPEXOLE DIHYDROCHLORIDE 1.5 MG/1
1.5 TABLET ORAL 3 TIMES DAILY
Qty: 270 TAB | Refills: 3 | Status: CANCELLED | OUTPATIENT
Start: 2020-01-01

## 2020-01-01 RX ORDER — VANCOMYCIN 1.75 GRAM/500 ML IN 0.9 % SODIUM CHLORIDE INTRAVENOUS
1750 ONCE
Status: DISCONTINUED | OUTPATIENT
Start: 2020-01-01 | End: 2020-01-01 | Stop reason: SDUPTHER

## 2020-01-01 RX ORDER — TRAZODONE HYDROCHLORIDE 150 MG/1
150 TABLET ORAL
COMMUNITY
End: 2020-01-01

## 2020-01-01 RX ORDER — FAMOTIDINE 20 MG/1
20 TABLET, FILM COATED ORAL 2 TIMES DAILY
Status: DISCONTINUED | OUTPATIENT
Start: 2020-01-01 | End: 2020-01-01

## 2020-01-01 RX ORDER — MEMANTINE HYDROCHLORIDE 10 MG/1
TABLET ORAL
Qty: 60 TAB | Refills: 10 | Status: ON HOLD | OUTPATIENT
Start: 2020-01-01 | End: 2020-01-01

## 2020-01-01 RX ORDER — KETOROLAC TROMETHAMINE 30 MG/ML
15 INJECTION, SOLUTION INTRAMUSCULAR; INTRAVENOUS
Status: ACTIVE | OUTPATIENT
Start: 2020-01-01 | End: 2020-01-01

## 2020-01-01 RX ORDER — QUETIAPINE FUMARATE 25 MG/1
25 TABLET, FILM COATED ORAL DAILY
COMMUNITY
End: 2020-01-01

## 2020-01-01 RX ORDER — ACETAMINOPHEN 650 MG/1
650 SUPPOSITORY RECTAL
Status: DISCONTINUED | OUTPATIENT
Start: 2020-01-01 | End: 2020-01-01

## 2020-01-01 RX ORDER — METOPROLOL TARTRATE 5 MG/5ML
2.5 INJECTION INTRAVENOUS EVERY 6 HOURS
Status: DISCONTINUED | OUTPATIENT
Start: 2020-01-01 | End: 2020-01-01

## 2020-01-01 RX ORDER — LEVODOPA AND CARBIDOPA 195; 48.75 MG/1; MG/1
CAPSULE, EXTENDED RELEASE ORAL
Qty: 120 CAP | Refills: 2 | Status: ON HOLD | OUTPATIENT
Start: 2020-01-01 | End: 2020-01-01

## 2020-01-01 RX ORDER — PRAMIPEXOLE DIHYDROCHLORIDE 1.5 MG/1
1.5 TABLET ORAL 3 TIMES DAILY
Qty: 270 TAB | Refills: 3
Start: 2020-01-01 | End: 2020-01-01 | Stop reason: DRUGHIGH

## 2020-01-01 RX ORDER — QUETIAPINE 150 MG/1
150 TABLET, FILM COATED, EXTENDED RELEASE ORAL
COMMUNITY
End: 2020-01-01

## 2020-01-01 RX ORDER — ONDANSETRON 2 MG/ML
4 INJECTION INTRAMUSCULAR; INTRAVENOUS
Status: DISCONTINUED | OUTPATIENT
Start: 2020-01-01 | End: 2020-01-01 | Stop reason: HOSPADM

## 2020-01-01 RX ORDER — SODIUM CHLORIDE 0.9 % (FLUSH) 0.9 %
10 SYRINGE (ML) INJECTION AS NEEDED
Status: DISCONTINUED | OUTPATIENT
Start: 2020-01-01 | End: 2020-01-01 | Stop reason: HOSPADM

## 2020-01-01 RX ORDER — CYCLOBENZAPRINE HCL 5 MG
5 TABLET ORAL
COMMUNITY
End: 2020-01-01

## 2020-01-01 RX ORDER — DIGOXIN 0.25 MG/ML
125 INJECTION INTRAMUSCULAR; INTRAVENOUS
Status: COMPLETED | OUTPATIENT
Start: 2020-01-01 | End: 2020-01-01

## 2020-01-01 RX ORDER — NOREPINEPHRINE BITARTRATE/D5W 8 MG/250ML
.5-16 PLASTIC BAG, INJECTION (ML) INTRAVENOUS
Status: DISCONTINUED | OUTPATIENT
Start: 2020-01-01 | End: 2020-01-01

## 2020-01-01 RX ORDER — MORPHINE SULFATE 2 MG/ML
2 INJECTION, SOLUTION INTRAMUSCULAR; INTRAVENOUS
Status: DISCONTINUED | OUTPATIENT
Start: 2020-01-01 | End: 2020-01-01

## 2020-01-01 RX ORDER — KETOROLAC TROMETHAMINE 30 MG/ML
15 INJECTION, SOLUTION INTRAMUSCULAR; INTRAVENOUS
Status: DISCONTINUED | OUTPATIENT
Start: 2020-01-01 | End: 2020-01-01 | Stop reason: HOSPADM

## 2020-01-01 RX ORDER — INSULIN LISPRO 100 [IU]/ML
INJECTION, SOLUTION INTRAVENOUS; SUBCUTANEOUS EVERY 6 HOURS
Status: DISCONTINUED | OUTPATIENT
Start: 2020-01-01 | End: 2020-01-01

## 2020-01-01 RX ORDER — GLYCOPYRROLATE 0.2 MG/ML
0.2 INJECTION INTRAMUSCULAR; INTRAVENOUS
Qty: 20 ML | Refills: 0 | Status: SHIPPED | OUTPATIENT
Start: 2020-01-01

## 2020-01-01 RX ORDER — MORPHINE SULFATE 2 MG/ML
2 INJECTION, SOLUTION INTRAMUSCULAR; INTRAVENOUS
Status: DISCONTINUED | OUTPATIENT
Start: 2020-01-01 | End: 2020-01-01 | Stop reason: HOSPADM

## 2020-01-01 RX ORDER — HYDROMORPHONE HYDROCHLORIDE 1 MG/ML
1 INJECTION, SOLUTION INTRAMUSCULAR; INTRAVENOUS; SUBCUTANEOUS EVERY 4 HOURS
Status: DISCONTINUED | OUTPATIENT
Start: 2020-01-01 | End: 2020-01-01

## 2020-01-01 RX ORDER — SODIUM CHLORIDE 0.9 % (FLUSH) 0.9 %
5 SYRINGE (ML) INJECTION EVERY 24 HOURS
Status: DISCONTINUED | OUTPATIENT
Start: 2020-01-01 | End: 2020-01-01 | Stop reason: HOSPADM

## 2020-01-01 RX ORDER — MORPHINE SULFATE 20 MG/ML
5 SOLUTION ORAL
Status: ON HOLD | COMMUNITY
End: 2020-01-01 | Stop reason: SDUPTHER

## 2020-01-01 RX ORDER — LORAZEPAM 2 MG/ML
1 CONCENTRATE ORAL
Status: DISCONTINUED | OUTPATIENT
Start: 2020-01-01 | End: 2020-01-01

## 2020-01-01 RX ORDER — SODIUM,POTASSIUM PHOSPHATES 280-250MG
2 POWDER IN PACKET (EA) ORAL ONCE
Status: DISCONTINUED | OUTPATIENT
Start: 2020-01-01 | End: 2020-01-01

## 2020-01-01 RX ORDER — PAROXETINE 10 MG/5ML
15 SUSPENSION ORAL
Status: DISCONTINUED | OUTPATIENT
Start: 2020-01-01 | End: 2020-01-01

## 2020-01-01 RX ORDER — HYDROMORPHONE HYDROCHLORIDE 2 MG/ML
2 INJECTION, SOLUTION INTRAMUSCULAR; INTRAVENOUS; SUBCUTANEOUS EVERY 4 HOURS
Status: DISCONTINUED | OUTPATIENT
Start: 2020-01-01 | End: 2020-01-01 | Stop reason: HOSPADM

## 2020-01-01 RX ORDER — QUETIAPINE FUMARATE 25 MG/1
25 TABLET, FILM COATED ORAL
Qty: 360 TAB | Refills: 1 | Status: ON HOLD | OUTPATIENT
Start: 2020-01-01 | End: 2020-01-01

## 2020-01-01 RX ORDER — DEXTROSE MONOHYDRATE 100 MG/ML
0-250 INJECTION, SOLUTION INTRAVENOUS AS NEEDED
Status: DISCONTINUED | OUTPATIENT
Start: 2020-01-01 | End: 2020-01-01

## 2020-01-01 RX ORDER — ACETAMINOPHEN 325 MG/1
650 TABLET ORAL
Status: DISCONTINUED | OUTPATIENT
Start: 2020-01-01 | End: 2020-01-01

## 2020-01-01 RX ORDER — ASCORBIC ACID 500 MG
1000 TABLET ORAL DAILY
COMMUNITY
End: 2020-01-01

## 2020-01-01 RX ORDER — VANCOMYCIN 1.75 GRAM/500 ML IN 0.9 % SODIUM CHLORIDE INTRAVENOUS
1750 ONCE
Status: COMPLETED | OUTPATIENT
Start: 2020-01-01 | End: 2020-01-01

## 2020-01-01 RX ORDER — ACETAMINOPHEN 650 MG/1
650 SUPPOSITORY RECTAL
COMMUNITY
End: 2020-01-01

## 2020-01-01 RX ORDER — HYDROMORPHONE HYDROCHLORIDE 1 MG/ML
1 INJECTION, SOLUTION INTRAMUSCULAR; INTRAVENOUS; SUBCUTANEOUS
Status: DISCONTINUED | OUTPATIENT
Start: 2020-01-01 | End: 2020-01-01 | Stop reason: HOSPADM

## 2020-01-01 RX ORDER — ACETYLCYSTEINE 200 MG/ML
200 SOLUTION ORAL; RESPIRATORY (INHALATION)
Status: DISCONTINUED | OUTPATIENT
Start: 2020-01-01 | End: 2020-01-01

## 2020-01-01 RX ORDER — SODIUM CHLORIDE 450 MG/100ML
100 INJECTION, SOLUTION INTRAVENOUS CONTINUOUS
Status: DISCONTINUED | OUTPATIENT
Start: 2020-01-01 | End: 2020-01-01

## 2020-01-01 RX ORDER — CEFTRIAXONE 1 G/1
INJECTION, POWDER, FOR SOLUTION INTRAMUSCULAR; INTRAVENOUS
Status: DISPENSED
Start: 2020-01-01 | End: 2020-01-01

## 2020-01-01 RX ORDER — MORPHINE SULFATE 2 MG/ML
2 INJECTION, SOLUTION INTRAMUSCULAR; INTRAVENOUS EVERY 4 HOURS
Status: DISCONTINUED | OUTPATIENT
Start: 2020-01-01 | End: 2020-01-01

## 2020-01-01 RX ORDER — POTASSIUM CHLORIDE 7.45 MG/ML
10 INJECTION INTRAVENOUS
Status: COMPLETED | OUTPATIENT
Start: 2020-01-01 | End: 2020-01-01

## 2020-01-01 RX ORDER — QUETIAPINE FUMARATE 100 MG/1
100 TABLET, FILM COATED ORAL
Qty: 90 TAB | Refills: 1 | Status: SHIPPED | OUTPATIENT
Start: 2020-01-01 | End: 2020-01-01

## 2020-01-01 RX ORDER — METOPROLOL TARTRATE 25 MG/1
12.5 TABLET, FILM COATED ORAL EVERY 12 HOURS
Status: DISCONTINUED | OUTPATIENT
Start: 2020-01-01 | End: 2020-01-01

## 2020-01-01 RX ORDER — LORAZEPAM 2 MG/ML
1 CONCENTRATE ORAL
Qty: 30 ML | Refills: 0 | Status: SHIPPED | OUTPATIENT
Start: 2020-01-01

## 2020-01-01 RX ORDER — SODIUM CHLORIDE, SODIUM LACTATE, POTASSIUM CHLORIDE, CALCIUM CHLORIDE 600; 310; 30; 20 MG/100ML; MG/100ML; MG/100ML; MG/100ML
150 INJECTION, SOLUTION INTRAVENOUS CONTINUOUS
Status: DISCONTINUED | OUTPATIENT
Start: 2020-01-01 | End: 2020-01-01

## 2020-01-01 RX ORDER — PRAMIPEXOLE DIHYDROCHLORIDE 1.5 MG/1
1.5 TABLET ORAL
COMMUNITY
End: 2020-01-01

## 2020-01-01 RX ORDER — IPRATROPIUM BROMIDE AND ALBUTEROL SULFATE 2.5; .5 MG/3ML; MG/3ML
3 SOLUTION RESPIRATORY (INHALATION)
Status: DISCONTINUED | OUTPATIENT
Start: 2020-01-01 | End: 2020-01-01

## 2020-01-01 RX ORDER — HYDROMORPHONE HYDROCHLORIDE 2 MG/ML
1.5 INJECTION, SOLUTION INTRAMUSCULAR; INTRAVENOUS; SUBCUTANEOUS EVERY 4 HOURS
Status: DISCONTINUED | OUTPATIENT
Start: 2020-01-01 | End: 2020-01-01

## 2020-01-01 RX ORDER — POLYETHYLENE GLYCOL 3350 17 G/17G
17 POWDER, FOR SOLUTION ORAL DAILY
COMMUNITY
End: 2020-01-01

## 2020-01-01 RX ORDER — ACETAMINOPHEN 650 MG/1
650 SUPPOSITORY RECTAL
Status: DISCONTINUED | OUTPATIENT
Start: 2020-01-01 | End: 2020-01-01 | Stop reason: HOSPADM

## 2020-01-01 RX ORDER — ACETAMINOPHEN 325 MG/1
650 TABLET ORAL
COMMUNITY
End: 2020-01-01

## 2020-01-01 RX ORDER — DOCUSATE SODIUM 50 MG/5ML
100 LIQUID ORAL 2 TIMES DAILY
Status: DISCONTINUED | OUTPATIENT
Start: 2020-01-01 | End: 2020-01-01

## 2020-01-01 RX ORDER — IPRATROPIUM BROMIDE AND ALBUTEROL SULFATE 2.5; .5 MG/3ML; MG/3ML
3 SOLUTION RESPIRATORY (INHALATION)
Status: DISCONTINUED | OUTPATIENT
Start: 2020-01-01 | End: 2020-01-01 | Stop reason: SDUPTHER

## 2020-01-01 RX ORDER — FACIAL-BODY WIPES
10 EACH TOPICAL
COMMUNITY
End: 2020-01-01

## 2020-01-01 RX ORDER — SENNOSIDES 8.8 MG/5ML
5 LIQUID ORAL EVERY EVENING
Status: DISCONTINUED | OUTPATIENT
Start: 2020-01-01 | End: 2020-01-01

## 2020-01-01 RX ORDER — MEMANTINE HYDROCHLORIDE 10 MG/1
TABLET ORAL
Qty: 60 TAB | Refills: 10 | Status: SHIPPED | OUTPATIENT
Start: 2020-01-01 | End: 2020-01-01

## 2020-01-01 RX ORDER — ALBUTEROL SULFATE 90 UG/1
2 AEROSOL, METERED RESPIRATORY (INHALATION)
Qty: 1 INHALER | Refills: 0 | Status: SHIPPED | OUTPATIENT
Start: 2020-01-01

## 2020-01-01 RX ORDER — MELATONIN
1000 DAILY
COMMUNITY
End: 2020-01-01

## 2020-01-01 RX ORDER — ENOXAPARIN SODIUM 100 MG/ML
35 INJECTION SUBCUTANEOUS EVERY 12 HOURS
Status: DISCONTINUED | OUTPATIENT
Start: 2020-01-01 | End: 2020-01-01

## 2020-01-01 RX ORDER — SODIUM CHLORIDE 0.9 % (FLUSH) 0.9 %
5-40 SYRINGE (ML) INJECTION AS NEEDED
Status: DISCONTINUED | OUTPATIENT
Start: 2020-01-01 | End: 2020-01-01

## 2020-01-01 RX ORDER — DEXAMETHASONE SODIUM PHOSPHATE 10 MG/ML
6 INJECTION INTRAMUSCULAR; INTRAVENOUS EVERY 24 HOURS
Status: DISCONTINUED | OUTPATIENT
Start: 2020-01-01 | End: 2020-01-01 | Stop reason: HOSPADM

## 2020-01-01 RX ORDER — ALBUMIN HUMAN 50 G/1000ML
25 SOLUTION INTRAVENOUS ONCE
Status: COMPLETED | OUTPATIENT
Start: 2020-01-01 | End: 2020-01-01

## 2020-01-01 RX ORDER — MORPHINE SULFATE 20 MG/ML
5 SOLUTION ORAL
Qty: 118 ML | Refills: 0 | Status: SHIPPED | OUTPATIENT
Start: 2020-01-01 | End: 2020-01-01

## 2020-01-01 RX ORDER — NOREPINEPHRINE BITARTRATE/D5W 8 MG/250ML
PLASTIC BAG, INJECTION (ML) INTRAVENOUS
Status: DISPENSED
Start: 2020-01-01 | End: 2020-01-01

## 2020-01-01 RX ORDER — GUAIFENESIN 600 MG/1
600 TABLET, EXTENDED RELEASE ORAL EVERY 12 HOURS
Status: DISCONTINUED | OUTPATIENT
Start: 2020-01-01 | End: 2020-01-01

## 2020-01-01 RX ORDER — SODIUM CHLORIDE 0.9 % (FLUSH) 0.9 %
5-40 SYRINGE (ML) INJECTION EVERY 8 HOURS
Status: DISCONTINUED | OUTPATIENT
Start: 2020-01-01 | End: 2020-01-01

## 2020-01-01 RX ORDER — LORAZEPAM 2 MG/ML
1 INJECTION INTRAMUSCULAR EVERY 4 HOURS
Status: DISCONTINUED | OUTPATIENT
Start: 2020-01-01 | End: 2020-01-01 | Stop reason: HOSPADM

## 2020-01-01 RX ORDER — SODIUM CHLORIDE 900 MG/100ML
INJECTION INTRAVENOUS
Status: DISPENSED
Start: 2020-01-01 | End: 2020-01-01

## 2020-01-01 RX ORDER — DEXAMETHASONE SODIUM PHOSPHATE 10 MG/ML
10 INJECTION INTRAMUSCULAR; INTRAVENOUS EVERY 6 HOURS
Status: DISCONTINUED | OUTPATIENT
Start: 2020-01-01 | End: 2020-01-01

## 2020-01-01 RX ORDER — INSULIN LISPRO 100 [IU]/ML
INJECTION, SOLUTION INTRAVENOUS; SUBCUTANEOUS EVERY 4 HOURS
Status: DISCONTINUED | OUTPATIENT
Start: 2020-01-01 | End: 2020-01-01

## 2020-01-01 RX ORDER — POTASSIUM CHLORIDE 7.45 MG/ML
10 INJECTION INTRAVENOUS
Status: DISCONTINUED | OUTPATIENT
Start: 2020-01-01 | End: 2020-01-01 | Stop reason: SDUPTHER

## 2020-01-01 RX ORDER — ASCORBIC ACID 500 MG
500 TABLET ORAL 2 TIMES DAILY
Status: DISCONTINUED | OUTPATIENT
Start: 2020-01-01 | End: 2020-01-01

## 2020-01-01 RX ORDER — ACETAMINOPHEN 650 MG/1
650 SUPPOSITORY RECTAL
Status: COMPLETED | OUTPATIENT
Start: 2020-01-01 | End: 2020-01-01

## 2020-01-01 RX ORDER — QUETIAPINE FUMARATE 50 MG/1
150 TABLET, FILM COATED ORAL
Qty: 270 TAB | Refills: 3 | Status: ON HOLD
Start: 2020-01-01 | End: 2020-01-01

## 2020-01-01 RX ADMIN — METOPROLOL TARTRATE 2.5 MG: 5 INJECTION INTRAVENOUS at 13:05

## 2020-01-01 RX ADMIN — PIPERACILLIN AND TAZOBACTAM 3.38 G: 3; .375 INJECTION, POWDER, LYOPHILIZED, FOR SOLUTION INTRAVENOUS at 13:14

## 2020-01-01 RX ADMIN — POTASSIUM PHOSPHATE, MONOBASIC AND POTASSIUM PHOSPHATE, DIBASIC: 224; 236 INJECTION, SOLUTION, CONCENTRATE INTRAVENOUS at 10:16

## 2020-01-01 RX ADMIN — HYDROMORPHONE HYDROCHLORIDE 1 MG: 1 INJECTION, SOLUTION INTRAMUSCULAR; INTRAVENOUS; SUBCUTANEOUS at 10:16

## 2020-01-01 RX ADMIN — POTASSIUM CHLORIDE 10 MEQ: 7.46 INJECTION, SOLUTION INTRAVENOUS at 14:24

## 2020-01-01 RX ADMIN — LORAZEPAM 1 MG: 2 INJECTION INTRAMUSCULAR; INTRAVENOUS at 01:02

## 2020-01-01 RX ADMIN — SODIUM CHLORIDE, SODIUM LACTATE, POTASSIUM CHLORIDE, AND CALCIUM CHLORIDE 150 ML/HR: 600; 310; 30; 20 INJECTION, SOLUTION INTRAVENOUS at 15:37

## 2020-01-01 RX ADMIN — LORAZEPAM 1 MG: 2 INJECTION INTRAMUSCULAR; INTRAVENOUS at 09:22

## 2020-01-01 RX ADMIN — IPRATROPIUM BROMIDE 2 PUFF: 17 AEROSOL, METERED RESPIRATORY (INHALATION) at 08:53

## 2020-01-01 RX ADMIN — LORAZEPAM 1 MG: 2 INJECTION INTRAMUSCULAR; INTRAVENOUS at 15:01

## 2020-01-01 RX ADMIN — SODIUM CHLORIDE, SODIUM LACTATE, POTASSIUM CHLORIDE, AND CALCIUM CHLORIDE 150 ML/HR: 600; 310; 30; 20 INJECTION, SOLUTION INTRAVENOUS at 14:24

## 2020-01-01 RX ADMIN — LORAZEPAM 1 MG: 2 INJECTION INTRAMUSCULAR; INTRAVENOUS at 13:02

## 2020-01-01 RX ADMIN — ENOXAPARIN SODIUM 35 MG: 60 INJECTION SUBCUTANEOUS at 01:15

## 2020-01-01 RX ADMIN — METOPROLOL TARTRATE 2.5 MG: 5 INJECTION INTRAVENOUS at 21:06

## 2020-01-01 RX ADMIN — ALBUMIN (HUMAN) 50 G: 12.5 INJECTION, SOLUTION INTRAVENOUS at 10:16

## 2020-01-01 RX ADMIN — Medication 5 ML: at 13:00

## 2020-01-01 RX ADMIN — ALBUTEROL SULFATE 2 PUFF: 90 AEROSOL, METERED RESPIRATORY (INHALATION) at 02:35

## 2020-01-01 RX ADMIN — LORAZEPAM 1 MG: 2 INJECTION INTRAMUSCULAR; INTRAVENOUS at 10:08

## 2020-01-01 RX ADMIN — PIPERACILLIN AND TAZOBACTAM 3.38 G: 3; .375 INJECTION, POWDER, LYOPHILIZED, FOR SOLUTION INTRAVENOUS at 12:54

## 2020-01-01 RX ADMIN — HYDROMORPHONE HYDROCHLORIDE 1.5 MG: 2 INJECTION INTRAMUSCULAR; INTRAVENOUS; SUBCUTANEOUS at 13:58

## 2020-01-01 RX ADMIN — ALBUTEROL SULFATE 2 PUFF: 90 AEROSOL, METERED RESPIRATORY (INHALATION) at 08:53

## 2020-01-01 RX ADMIN — LORAZEPAM 1 MG: 2 INJECTION INTRAMUSCULAR; INTRAVENOUS at 21:13

## 2020-01-01 RX ADMIN — IPRATROPIUM BROMIDE 2 PUFF: 17 AEROSOL, METERED RESPIRATORY (INHALATION) at 10:57

## 2020-01-01 RX ADMIN — LORAZEPAM 1 MG: 2 INJECTION INTRAMUSCULAR; INTRAVENOUS at 19:39

## 2020-01-01 RX ADMIN — DEXAMETHASONE SODIUM PHOSPHATE 2 MG: 4 INJECTION, SOLUTION INTRAMUSCULAR; INTRAVENOUS at 01:13

## 2020-01-01 RX ADMIN — MORPHINE SULFATE 2 MG: 2 INJECTION, SOLUTION INTRAMUSCULAR; INTRAVENOUS at 14:13

## 2020-01-01 RX ADMIN — LORAZEPAM 1 MG: 2 INJECTION INTRAMUSCULAR; INTRAVENOUS at 12:50

## 2020-01-01 RX ADMIN — HYDROMORPHONE HYDROCHLORIDE 1 MG: 1 INJECTION, SOLUTION INTRAMUSCULAR; INTRAVENOUS; SUBCUTANEOUS at 19:59

## 2020-01-01 RX ADMIN — SODIUM CHLORIDE 100 ML/HR: 450 INJECTION, SOLUTION INTRAVENOUS at 13:14

## 2020-01-01 RX ADMIN — ACETAMINOPHEN 650 MG: 650 SUPPOSITORY RECTAL at 00:55

## 2020-01-01 RX ADMIN — LORAZEPAM 1 MG: 2 INJECTION INTRAMUSCULAR; INTRAVENOUS at 16:47

## 2020-01-01 RX ADMIN — METOPROLOL TARTRATE 2.5 MG: 5 INJECTION INTRAVENOUS at 00:53

## 2020-01-01 RX ADMIN — SODIUM CHLORIDE 100 ML/HR: 450 INJECTION, SOLUTION INTRAVENOUS at 01:03

## 2020-01-01 RX ADMIN — Medication 10 ML: at 13:39

## 2020-01-01 RX ADMIN — METOPROLOL TARTRATE 2.5 MG: 5 INJECTION INTRAVENOUS at 06:03

## 2020-01-01 RX ADMIN — LORAZEPAM 1 MG: 2 INJECTION INTRAMUSCULAR; INTRAVENOUS at 09:06

## 2020-01-01 RX ADMIN — Medication 10 ML: at 06:41

## 2020-01-01 RX ADMIN — MORPHINE SULFATE 2 MG: 2 INJECTION, SOLUTION INTRAMUSCULAR; INTRAVENOUS at 04:22

## 2020-01-01 RX ADMIN — LORAZEPAM 1 MG: 2 INJECTION INTRAMUSCULAR; INTRAVENOUS at 16:42

## 2020-01-01 RX ADMIN — PIPERACILLIN AND TAZOBACTAM 3.38 G: 3; .375 INJECTION, POWDER, LYOPHILIZED, FOR SOLUTION INTRAVENOUS at 21:09

## 2020-01-01 RX ADMIN — ACETYLCYSTEINE 200 MG: 200 SOLUTION ORAL; RESPIRATORY (INHALATION) at 08:22

## 2020-01-01 RX ADMIN — ACETAMINOPHEN 650 MG: 650 SUPPOSITORY RECTAL at 04:43

## 2020-01-01 RX ADMIN — LORAZEPAM 1 MG: 2 INJECTION INTRAMUSCULAR; INTRAVENOUS at 10:16

## 2020-01-01 RX ADMIN — Medication 10 ML: at 22:47

## 2020-01-01 RX ADMIN — Medication 40 ML: at 21:05

## 2020-01-01 RX ADMIN — Medication 10 ML: at 13:08

## 2020-01-01 RX ADMIN — LORAZEPAM 1 MG: 2 INJECTION INTRAMUSCULAR; INTRAVENOUS at 09:16

## 2020-01-01 RX ADMIN — MORPHINE SULFATE 2 MG: 2 INJECTION, SOLUTION INTRAMUSCULAR; INTRAVENOUS at 05:37

## 2020-01-01 RX ADMIN — POTASSIUM CHLORIDE 10 MEQ: 14.9 INJECTION, SOLUTION INTRAVENOUS at 10:12

## 2020-01-01 RX ADMIN — ALBUTEROL SULFATE 2 PUFF: 90 AEROSOL, METERED RESPIRATORY (INHALATION) at 03:02

## 2020-01-01 RX ADMIN — LORAZEPAM 1 MG: 2 INJECTION INTRAMUSCULAR; INTRAVENOUS at 21:56

## 2020-01-01 RX ADMIN — LORAZEPAM 1 MG: 2 INJECTION INTRAMUSCULAR; INTRAVENOUS at 05:21

## 2020-01-01 RX ADMIN — POTASSIUM CHLORIDE 10 MEQ: 7.46 INJECTION, SOLUTION INTRAVENOUS at 12:29

## 2020-01-01 RX ADMIN — LORAZEPAM 1 MG: 2 INJECTION INTRAMUSCULAR; INTRAVENOUS at 08:57

## 2020-01-01 RX ADMIN — MORPHINE SULFATE 2 MG: 2 INJECTION, SOLUTION INTRAMUSCULAR; INTRAVENOUS at 19:11

## 2020-01-01 RX ADMIN — LORAZEPAM 1 MG: 2 INJECTION INTRAMUSCULAR; INTRAVENOUS at 05:26

## 2020-01-01 RX ADMIN — POTASSIUM CHLORIDE 10 MEQ: 7.46 INJECTION, SOLUTION INTRAVENOUS at 09:28

## 2020-01-01 RX ADMIN — POTASSIUM CHLORIDE 10 MEQ: 7.46 INJECTION, SOLUTION INTRAVENOUS at 10:56

## 2020-01-01 RX ADMIN — HYDROMORPHONE HYDROCHLORIDE 1.5 MG: 2 INJECTION INTRAMUSCULAR; INTRAVENOUS; SUBCUTANEOUS at 01:21

## 2020-01-01 RX ADMIN — PIPERACILLIN AND TAZOBACTAM 3.38 G: 3; .375 INJECTION, POWDER, LYOPHILIZED, FOR SOLUTION INTRAVENOUS at 20:42

## 2020-01-01 RX ADMIN — VANCOMYCIN HYDROCHLORIDE 1250 MG: 10 INJECTION, POWDER, LYOPHILIZED, FOR SOLUTION INTRAVENOUS at 13:04

## 2020-01-01 RX ADMIN — DEXAMETHASONE SODIUM PHOSPHATE 6 MG: 10 INJECTION, SOLUTION INTRAMUSCULAR; INTRAVENOUS at 00:54

## 2020-01-01 RX ADMIN — ACETAMINOPHEN 650 MG: 650 SUPPOSITORY RECTAL at 00:45

## 2020-01-01 RX ADMIN — LORAZEPAM 1 MG: 2 INJECTION INTRAMUSCULAR; INTRAVENOUS at 01:22

## 2020-01-01 RX ADMIN — PIPERACILLIN AND TAZOBACTAM 3.38 G: 3; .375 INJECTION, POWDER, LYOPHILIZED, FOR SOLUTION INTRAVENOUS at 20:56

## 2020-01-01 RX ADMIN — Medication 10 ML: at 22:39

## 2020-01-01 RX ADMIN — ACETAMINOPHEN 650 MG: 650 SUPPOSITORY RECTAL at 11:56

## 2020-01-01 RX ADMIN — HYDROMORPHONE HYDROCHLORIDE 1.5 MG: 2 INJECTION INTRAMUSCULAR; INTRAVENOUS; SUBCUTANEOUS at 05:50

## 2020-01-01 RX ADMIN — LORAZEPAM 1 MG: 2 INJECTION INTRAMUSCULAR; INTRAVENOUS at 14:13

## 2020-01-01 RX ADMIN — LORAZEPAM 1 MG: 2 INJECTION INTRAMUSCULAR; INTRAVENOUS at 05:17

## 2020-01-01 RX ADMIN — Medication 10 ML: at 23:02

## 2020-01-01 RX ADMIN — ACETAMINOPHEN 650 MG: 650 SUPPOSITORY RECTAL at 18:19

## 2020-01-01 RX ADMIN — SODIUM CHLORIDE 100 ML/HR: 450 INJECTION, SOLUTION INTRAVENOUS at 02:58

## 2020-01-01 RX ADMIN — LORAZEPAM 1 MG: 2 INJECTION INTRAMUSCULAR; INTRAVENOUS at 19:09

## 2020-01-01 RX ADMIN — DIGOXIN 125 MCG: 0.25 INJECTION INTRAMUSCULAR; INTRAVENOUS at 14:35

## 2020-01-01 RX ADMIN — LORAZEPAM 1 MG: 2 INJECTION INTRAMUSCULAR; INTRAVENOUS at 05:37

## 2020-01-01 RX ADMIN — Medication 10 ML: at 16:11

## 2020-01-01 RX ADMIN — ALBUTEROL SULFATE 2 PUFF: 90 AEROSOL, METERED RESPIRATORY (INHALATION) at 20:11

## 2020-01-01 RX ADMIN — LORAZEPAM 1 MG: 2 INJECTION INTRAMUSCULAR; INTRAVENOUS at 16:37

## 2020-01-01 RX ADMIN — POTASSIUM CHLORIDE 10 MEQ: 14.9 INJECTION, SOLUTION INTRAVENOUS at 11:13

## 2020-01-01 RX ADMIN — LORAZEPAM 1 MG: 2 INJECTION INTRAMUSCULAR; INTRAVENOUS at 17:23

## 2020-01-01 RX ADMIN — LORAZEPAM 1 MG: 2 INJECTION INTRAMUSCULAR; INTRAVENOUS at 07:08

## 2020-01-01 RX ADMIN — MORPHINE SULFATE 2 MG: 2 INJECTION, SOLUTION INTRAMUSCULAR; INTRAVENOUS at 07:08

## 2020-01-01 RX ADMIN — Medication 10 ML: at 22:40

## 2020-01-01 RX ADMIN — DEXAMETHASONE SODIUM PHOSPHATE 6 MG: 10 INJECTION, SOLUTION INTRAMUSCULAR; INTRAVENOUS at 02:13

## 2020-01-01 RX ADMIN — Medication 10 ML: at 21:19

## 2020-01-01 RX ADMIN — PIPERACILLIN AND TAZOBACTAM 3.38 G: 3; .375 INJECTION, POWDER, LYOPHILIZED, FOR SOLUTION INTRAVENOUS at 06:05

## 2020-01-01 RX ADMIN — SODIUM CHLORIDE, SODIUM LACTATE, POTASSIUM CHLORIDE, AND CALCIUM CHLORIDE 1000 ML: 600; 310; 30; 20 INJECTION, SOLUTION INTRAVENOUS at 18:09

## 2020-01-01 RX ADMIN — LORAZEPAM 1 MG: 2 INJECTION INTRAMUSCULAR; INTRAVENOUS at 05:34

## 2020-01-01 RX ADMIN — MORPHINE SULFATE 2 MG: 2 INJECTION, SOLUTION INTRAMUSCULAR; INTRAVENOUS at 09:22

## 2020-01-01 RX ADMIN — LORAZEPAM 1 MG: 2 INJECTION INTRAMUSCULAR; INTRAVENOUS at 05:47

## 2020-01-01 RX ADMIN — VANCOMYCIN HYDROCHLORIDE 1000 MG: 1 INJECTION, POWDER, LYOPHILIZED, FOR SOLUTION INTRAVENOUS at 01:00

## 2020-01-01 RX ADMIN — MORPHINE SULFATE 2 MG: 2 INJECTION, SOLUTION INTRAMUSCULAR; INTRAVENOUS at 12:50

## 2020-01-01 RX ADMIN — IPRATROPIUM BROMIDE 2 PUFF: 17 AEROSOL, METERED RESPIRATORY (INHALATION) at 02:36

## 2020-01-01 RX ADMIN — MORPHINE SULFATE 2 MG: 2 INJECTION, SOLUTION INTRAMUSCULAR; INTRAVENOUS at 05:26

## 2020-01-01 RX ADMIN — VANCOMYCIN HYDROCHLORIDE 1000 MG: 1 INJECTION, POWDER, LYOPHILIZED, FOR SOLUTION INTRAVENOUS at 13:14

## 2020-01-01 RX ADMIN — MORPHINE SULFATE 2 MG: 2 INJECTION, SOLUTION INTRAMUSCULAR; INTRAVENOUS at 20:52

## 2020-01-01 RX ADMIN — POTASSIUM CHLORIDE 10 MEQ: 14.9 INJECTION, SOLUTION INTRAVENOUS at 13:07

## 2020-01-01 RX ADMIN — DEXAMETHASONE SODIUM PHOSPHATE 6 MG: 10 INJECTION, SOLUTION INTRAMUSCULAR; INTRAVENOUS at 04:22

## 2020-01-01 RX ADMIN — MORPHINE SULFATE 2 MG: 2 INJECTION, SOLUTION INTRAMUSCULAR; INTRAVENOUS at 15:15

## 2020-01-01 RX ADMIN — VANCOMYCIN HYDROCHLORIDE 1750 MG: 10 INJECTION, POWDER, LYOPHILIZED, FOR SOLUTION INTRAVENOUS at 09:27

## 2020-01-01 RX ADMIN — PIPERACILLIN AND TAZOBACTAM 3.38 G: 3; .375 INJECTION, POWDER, LYOPHILIZED, FOR SOLUTION INTRAVENOUS at 13:04

## 2020-01-01 RX ADMIN — LORAZEPAM 1 MG: 2 INJECTION INTRAMUSCULAR; INTRAVENOUS at 20:01

## 2020-01-01 RX ADMIN — ENOXAPARIN SODIUM 35 MG: 60 INJECTION SUBCUTANEOUS at 12:54

## 2020-01-01 RX ADMIN — Medication 5 ML: at 16:27

## 2020-01-01 RX ADMIN — LORAZEPAM 1 MG: 2 INJECTION INTRAMUSCULAR; INTRAVENOUS at 01:38

## 2020-01-01 RX ADMIN — MORPHINE SULFATE 2 MG: 2 INJECTION, SOLUTION INTRAMUSCULAR; INTRAVENOUS at 16:27

## 2020-01-01 RX ADMIN — LORAZEPAM 1 MG: 2 INJECTION INTRAMUSCULAR; INTRAVENOUS at 14:29

## 2020-01-01 RX ADMIN — ALBUTEROL SULFATE 2 PUFF: 90 AEROSOL, METERED RESPIRATORY (INHALATION) at 20:23

## 2020-01-01 RX ADMIN — PIPERACILLIN AND TAZOBACTAM 3.38 G: 3; .375 INJECTION, POWDER, LYOPHILIZED, FOR SOLUTION INTRAVENOUS at 05:25

## 2020-01-01 RX ADMIN — IPRATROPIUM BROMIDE 2 PUFF: 17 AEROSOL, METERED RESPIRATORY (INHALATION) at 15:37

## 2020-01-01 RX ADMIN — MORPHINE SULFATE 2 MG: 2 INJECTION, SOLUTION INTRAMUSCULAR; INTRAVENOUS at 01:13

## 2020-01-01 RX ADMIN — IPRATROPIUM BROMIDE 2 PUFF: 17 AEROSOL, METERED RESPIRATORY (INHALATION) at 01:29

## 2020-01-01 RX ADMIN — DEXAMETHASONE SODIUM PHOSPHATE 10 MG: 10 INJECTION, SOLUTION INTRAMUSCULAR; INTRAVENOUS at 01:46

## 2020-01-01 RX ADMIN — ENOXAPARIN SODIUM 35 MG: 60 INJECTION SUBCUTANEOUS at 00:59

## 2020-01-01 RX ADMIN — VANCOMYCIN HYDROCHLORIDE 1000 MG: 1 INJECTION, POWDER, LYOPHILIZED, FOR SOLUTION INTRAVENOUS at 13:28

## 2020-01-01 RX ADMIN — LORAZEPAM 1 MG: 2 INJECTION INTRAMUSCULAR; INTRAVENOUS at 10:48

## 2020-01-01 RX ADMIN — SODIUM CHLORIDE, SODIUM LACTATE, POTASSIUM CHLORIDE, AND CALCIUM CHLORIDE 1000 ML: 600; 310; 30; 20 INJECTION, SOLUTION INTRAVENOUS at 20:42

## 2020-01-01 RX ADMIN — LORAZEPAM 1 MG: 2 INJECTION INTRAMUSCULAR; INTRAVENOUS at 20:57

## 2020-01-01 RX ADMIN — SODIUM CHLORIDE 100 ML/HR: 450 INJECTION, SOLUTION INTRAVENOUS at 17:11

## 2020-01-01 RX ADMIN — HYDROMORPHONE HYDROCHLORIDE 1 MG: 1 INJECTION, SOLUTION INTRAMUSCULAR; INTRAVENOUS; SUBCUTANEOUS at 15:57

## 2020-01-01 RX ADMIN — MORPHINE SULFATE 2 MG: 2 INJECTION, SOLUTION INTRAMUSCULAR; INTRAVENOUS at 21:46

## 2020-01-01 RX ADMIN — LORAZEPAM 1 MG: 2 INJECTION INTRAMUSCULAR; INTRAVENOUS at 00:16

## 2020-01-01 RX ADMIN — HYDROMORPHONE HYDROCHLORIDE 1 MG: 1 INJECTION, SOLUTION INTRAMUSCULAR; INTRAVENOUS; SUBCUTANEOUS at 10:07

## 2020-01-01 RX ADMIN — CEFTRIAXONE SODIUM 1 G: 1 INJECTION, POWDER, FOR SOLUTION INTRAMUSCULAR; INTRAVENOUS at 01:45

## 2020-01-01 RX ADMIN — VANCOMYCIN HYDROCHLORIDE 1250 MG: 10 INJECTION, POWDER, LYOPHILIZED, FOR SOLUTION INTRAVENOUS at 12:54

## 2020-01-01 RX ADMIN — MORPHINE SULFATE 2 MG: 2 INJECTION, SOLUTION INTRAMUSCULAR; INTRAVENOUS at 21:14

## 2020-01-01 RX ADMIN — MORPHINE SULFATE 2 MG: 2 INJECTION, SOLUTION INTRAMUSCULAR; INTRAVENOUS at 12:34

## 2020-01-01 RX ADMIN — MORPHINE SULFATE 2 MG: 2 INJECTION, SOLUTION INTRAMUSCULAR; INTRAVENOUS at 00:06

## 2020-01-01 RX ADMIN — LORAZEPAM 1 MG: 2 INJECTION INTRAMUSCULAR; INTRAVENOUS at 12:25

## 2020-01-01 RX ADMIN — ALBUTEROL SULFATE 2 PUFF: 90 AEROSOL, METERED RESPIRATORY (INHALATION) at 15:38

## 2020-01-01 RX ADMIN — MORPHINE SULFATE 2 MG: 2 INJECTION, SOLUTION INTRAMUSCULAR; INTRAVENOUS at 05:47

## 2020-01-01 RX ADMIN — MORPHINE SULFATE 2 MG: 2 INJECTION, SOLUTION INTRAMUSCULAR; INTRAVENOUS at 02:45

## 2020-01-01 RX ADMIN — ALBUTEROL SULFATE 2 PUFF: 90 AEROSOL, METERED RESPIRATORY (INHALATION) at 15:37

## 2020-01-01 RX ADMIN — LORAZEPAM 1 MG: 2 INJECTION INTRAMUSCULAR; INTRAVENOUS at 16:10

## 2020-01-01 RX ADMIN — MORPHINE SULFATE 2 MG: 2 INJECTION, SOLUTION INTRAMUSCULAR; INTRAVENOUS at 00:16

## 2020-01-01 RX ADMIN — ALBUMIN (HUMAN) 50 G: 12.5 INJECTION, SOLUTION INTRAVENOUS at 13:15

## 2020-01-01 RX ADMIN — MORPHINE SULFATE 2 MG: 2 INJECTION, SOLUTION INTRAMUSCULAR; INTRAVENOUS at 16:10

## 2020-01-01 RX ADMIN — SODIUM CHLORIDE 100 ML/HR: 450 INJECTION, SOLUTION INTRAVENOUS at 00:52

## 2020-01-01 RX ADMIN — LORAZEPAM 1 MG: 2 INJECTION INTRAMUSCULAR; INTRAVENOUS at 00:42

## 2020-01-01 RX ADMIN — VANCOMYCIN HYDROCHLORIDE 1000 MG: 1 INJECTION, POWDER, LYOPHILIZED, FOR SOLUTION INTRAVENOUS at 01:14

## 2020-01-01 RX ADMIN — Medication 10 ML: at 06:04

## 2020-01-01 RX ADMIN — PIPERACILLIN AND TAZOBACTAM 3.38 G: 3; .375 INJECTION, POWDER, LYOPHILIZED, FOR SOLUTION INTRAVENOUS at 20:26

## 2020-01-01 RX ADMIN — DEXAMETHASONE SODIUM PHOSPHATE 6 MG: 10 INJECTION, SOLUTION INTRAMUSCULAR; INTRAVENOUS at 01:04

## 2020-01-01 RX ADMIN — SODIUM CHLORIDE, SODIUM LACTATE, POTASSIUM CHLORIDE, AND CALCIUM CHLORIDE 150 ML/HR: 600; 310; 30; 20 INJECTION, SOLUTION INTRAVENOUS at 08:24

## 2020-01-01 RX ADMIN — ACETAMINOPHEN 650 MG: 650 SUPPOSITORY RECTAL at 01:45

## 2020-01-01 RX ADMIN — MORPHINE SULFATE 2 MG: 2 INJECTION, SOLUTION INTRAMUSCULAR; INTRAVENOUS at 17:23

## 2020-01-01 RX ADMIN — SODIUM CHLORIDE, SODIUM LACTATE, POTASSIUM CHLORIDE, AND CALCIUM CHLORIDE 150 ML/HR: 600; 310; 30; 20 INJECTION, SOLUTION INTRAVENOUS at 00:52

## 2020-01-01 RX ADMIN — HYDROMORPHONE HYDROCHLORIDE 1 MG: 1 INJECTION, SOLUTION INTRAMUSCULAR; INTRAVENOUS; SUBCUTANEOUS at 05:21

## 2020-01-01 RX ADMIN — MORPHINE SULFATE 2 MG: 2 INJECTION, SOLUTION INTRAMUSCULAR; INTRAVENOUS at 09:06

## 2020-01-01 RX ADMIN — LORAZEPAM 1 MG: 2 INJECTION INTRAMUSCULAR; INTRAVENOUS at 01:12

## 2020-01-01 RX ADMIN — IPRATROPIUM BROMIDE 2 PUFF: 17 AEROSOL, METERED RESPIRATORY (INHALATION) at 08:17

## 2020-01-01 RX ADMIN — Medication 5 ML: at 14:13

## 2020-01-01 RX ADMIN — MORPHINE SULFATE 2 MG: 2 INJECTION, SOLUTION INTRAMUSCULAR; INTRAVENOUS at 16:41

## 2020-01-01 RX ADMIN — GLYCOPYRROLATE 0.2 MG: 0.2 INJECTION, SOLUTION INTRAMUSCULAR; INTRAVENOUS at 08:57

## 2020-01-01 RX ADMIN — PIPERACILLIN AND TAZOBACTAM 3.38 G: 3; .375 INJECTION, POWDER, LYOPHILIZED, FOR SOLUTION INTRAVENOUS at 05:23

## 2020-01-01 RX ADMIN — SODIUM CHLORIDE, SODIUM LACTATE, POTASSIUM CHLORIDE, AND CALCIUM CHLORIDE 1000 ML: 600; 310; 30; 20 INJECTION, SOLUTION INTRAVENOUS at 05:11

## 2020-01-01 RX ADMIN — MORPHINE SULFATE 2 MG: 2 INJECTION, SOLUTION INTRAMUSCULAR; INTRAVENOUS at 01:44

## 2020-01-01 RX ADMIN — Medication 10 ML: at 02:52

## 2020-01-01 RX ADMIN — FAMOTIDINE 20 MG: 10 INJECTION INTRAVENOUS at 08:24

## 2020-01-01 RX ADMIN — POTASSIUM CHLORIDE 10 MEQ: 14.9 INJECTION, SOLUTION INTRAVENOUS at 12:46

## 2020-01-01 RX ADMIN — ALBUTEROL SULFATE 2 PUFF: 90 AEROSOL, METERED RESPIRATORY (INHALATION) at 11:01

## 2020-01-01 RX ADMIN — Medication 10 ML: at 13:24

## 2020-01-01 RX ADMIN — HYDROMORPHONE HYDROCHLORIDE 1 MG: 1 INJECTION, SOLUTION INTRAMUSCULAR; INTRAVENOUS; SUBCUTANEOUS at 19:09

## 2020-01-01 RX ADMIN — IPRATROPIUM BROMIDE 2 PUFF: 17 AEROSOL, METERED RESPIRATORY (INHALATION) at 20:00

## 2020-01-01 RX ADMIN — MORPHINE SULFATE 2 MG: 2 INJECTION, SOLUTION INTRAMUSCULAR; INTRAVENOUS at 16:42

## 2020-01-01 RX ADMIN — MORPHINE SULFATE 2 MG: 2 INJECTION, SOLUTION INTRAMUSCULAR; INTRAVENOUS at 05:18

## 2020-01-01 RX ADMIN — MORPHINE SULFATE 2 MG: 2 INJECTION, SOLUTION INTRAMUSCULAR; INTRAVENOUS at 14:30

## 2020-01-01 RX ADMIN — IPRATROPIUM BROMIDE 2 PUFF: 17 AEROSOL, METERED RESPIRATORY (INHALATION) at 21:12

## 2020-01-01 RX ADMIN — HYDROMORPHONE HYDROCHLORIDE 1 MG: 1 INJECTION, SOLUTION INTRAMUSCULAR; INTRAVENOUS; SUBCUTANEOUS at 11:31

## 2020-01-01 RX ADMIN — LORAZEPAM 1 MG: 2 INJECTION INTRAMUSCULAR; INTRAVENOUS at 23:02

## 2020-01-01 RX ADMIN — Medication 10 ML: at 07:08

## 2020-01-01 RX ADMIN — SODIUM CHLORIDE 100 ML/HR: 450 INJECTION, SOLUTION INTRAVENOUS at 13:03

## 2020-01-01 RX ADMIN — IPRATROPIUM BROMIDE 2 PUFF: 17 AEROSOL, METERED RESPIRATORY (INHALATION) at 03:02

## 2020-01-01 RX ADMIN — SODIUM CHLORIDE 1000 ML: 9 INJECTION, SOLUTION INTRAVENOUS at 01:37

## 2020-01-01 RX ADMIN — LORAZEPAM 1 MG: 2 INJECTION INTRAMUSCULAR; INTRAVENOUS at 16:19

## 2020-01-01 RX ADMIN — METOPROLOL TARTRATE 2.5 MG: 5 INJECTION INTRAVENOUS at 18:44

## 2020-01-01 RX ADMIN — IPRATROPIUM BROMIDE 2 PUFF: 17 AEROSOL, METERED RESPIRATORY (INHALATION) at 15:39

## 2020-01-01 RX ADMIN — DEXAMETHASONE SODIUM PHOSPHATE 6 MG: 10 INJECTION, SOLUTION INTRAMUSCULAR; INTRAVENOUS at 01:44

## 2020-01-01 RX ADMIN — MORPHINE SULFATE 2 MG: 2 INJECTION, SOLUTION INTRAMUSCULAR; INTRAVENOUS at 01:39

## 2020-01-01 RX ADMIN — Medication 10 ML: at 21:03

## 2020-01-01 RX ADMIN — HYDROMORPHONE HYDROCHLORIDE 1.5 MG: 2 INJECTION INTRAMUSCULAR; INTRAVENOUS; SUBCUTANEOUS at 08:57

## 2020-01-01 RX ADMIN — LORAZEPAM 1 MG: 2 INJECTION INTRAMUSCULAR; INTRAVENOUS at 04:30

## 2020-01-01 RX ADMIN — PIPERACILLIN AND TAZOBACTAM 3.38 G: 3; .375 INJECTION, POWDER, LYOPHILIZED, FOR SOLUTION INTRAVENOUS at 12:33

## 2020-01-01 RX ADMIN — PIPERACILLIN AND TAZOBACTAM 3.38 G: 3; .375 INJECTION, POWDER, LYOPHILIZED, FOR SOLUTION INTRAVENOUS at 05:07

## 2020-01-01 RX ADMIN — PIPERACILLIN AND TAZOBACTAM 3.38 G: 3; .375 INJECTION, POWDER, LYOPHILIZED, FOR SOLUTION INTRAVENOUS at 04:55

## 2020-01-01 RX ADMIN — LORAZEPAM 1 MG: 2 INJECTION INTRAMUSCULAR; INTRAVENOUS at 04:05

## 2020-01-01 RX ADMIN — DEXAMETHASONE SODIUM PHOSPHATE 6 MG: 10 INJECTION, SOLUTION INTRAMUSCULAR; INTRAVENOUS at 01:07

## 2020-01-01 RX ADMIN — DEXAMETHASONE SODIUM PHOSPHATE 6 MG: 10 INJECTION, SOLUTION INTRAMUSCULAR; INTRAVENOUS at 02:51

## 2020-01-01 RX ADMIN — AZITHROMYCIN MONOHYDRATE 500 MG: 500 INJECTION, POWDER, LYOPHILIZED, FOR SOLUTION INTRAVENOUS at 02:10

## 2020-01-01 RX ADMIN — LORAZEPAM 1 MG: 2 INJECTION INTRAMUSCULAR; INTRAVENOUS at 00:27

## 2020-01-01 RX ADMIN — ENOXAPARIN SODIUM 36 MG: 40 INJECTION SUBCUTANEOUS at 13:29

## 2020-01-01 RX ADMIN — MORPHINE SULFATE 2 MG: 2 INJECTION, SOLUTION INTRAMUSCULAR; INTRAVENOUS at 16:19

## 2020-01-01 RX ADMIN — ALBUMIN (HUMAN) 25 G: 12.5 INJECTION, SOLUTION INTRAVENOUS at 05:16

## 2020-01-01 RX ADMIN — MORPHINE SULFATE 2 MG: 2 INJECTION, SOLUTION INTRAMUSCULAR; INTRAVENOUS at 09:16

## 2020-01-01 RX ADMIN — POTASSIUM CHLORIDE 10 MEQ: 7.46 INJECTION, SOLUTION INTRAVENOUS at 07:00

## 2020-01-01 RX ADMIN — MORPHINE SULFATE 2 MG: 2 INJECTION, SOLUTION INTRAMUSCULAR; INTRAVENOUS at 01:02

## 2020-01-01 RX ADMIN — MORPHINE SULFATE 2 MG: 2 INJECTION, SOLUTION INTRAMUSCULAR; INTRAVENOUS at 23:02

## 2020-01-01 RX ADMIN — Medication 10 ML: at 05:48

## 2020-01-01 RX ADMIN — MORPHINE SULFATE 2 MG: 2 INJECTION, SOLUTION INTRAMUSCULAR; INTRAVENOUS at 20:57

## 2020-01-01 RX ADMIN — Medication 10 ML: at 06:51

## 2020-01-01 RX ADMIN — Medication 5 ML: at 15:04

## 2020-01-01 RX ADMIN — KETOROLAC TROMETHAMINE 15 MG: 30 INJECTION, SOLUTION INTRAMUSCULAR at 08:57

## 2020-01-01 RX ADMIN — LORAZEPAM 1 MG: 2 INJECTION INTRAMUSCULAR; INTRAVENOUS at 12:33

## 2020-01-01 RX ADMIN — PIPERACILLIN AND TAZOBACTAM 3.38 G: 3; .375 INJECTION, POWDER, LYOPHILIZED, FOR SOLUTION INTRAVENOUS at 13:23

## 2020-01-01 RX ADMIN — LORAZEPAM 1 MG: 2 INJECTION INTRAMUSCULAR; INTRAVENOUS at 09:44

## 2020-01-01 RX ADMIN — ENOXAPARIN SODIUM 35 MG: 60 INJECTION SUBCUTANEOUS at 01:16

## 2020-01-01 RX ADMIN — Medication 5 ML: at 13:59

## 2020-01-01 RX ADMIN — ENOXAPARIN SODIUM 35 MG: 60 INJECTION SUBCUTANEOUS at 13:14

## 2020-01-01 RX ADMIN — ALBUTEROL SULFATE 2 PUFF: 90 AEROSOL, METERED RESPIRATORY (INHALATION) at 01:28

## 2020-01-01 RX ADMIN — ALBUTEROL SULFATE 2 PUFF: 90 AEROSOL, METERED RESPIRATORY (INHALATION) at 01:07

## 2020-01-01 RX ADMIN — VANCOMYCIN HYDROCHLORIDE 1250 MG: 10 INJECTION, POWDER, LYOPHILIZED, FOR SOLUTION INTRAVENOUS at 00:53

## 2020-01-01 RX ADMIN — MORPHINE SULFATE 2 MG: 2 INJECTION, SOLUTION INTRAMUSCULAR; INTRAVENOUS at 12:25

## 2020-01-01 RX ADMIN — Medication 10 ML: at 14:24

## 2020-01-01 RX ADMIN — HYDROMORPHONE HYDROCHLORIDE 1 MG: 1 INJECTION, SOLUTION INTRAMUSCULAR; INTRAVENOUS; SUBCUTANEOUS at 00:27

## 2020-01-01 RX ADMIN — HYDROMORPHONE HYDROCHLORIDE 1.5 MG: 2 INJECTION INTRAMUSCULAR; INTRAVENOUS; SUBCUTANEOUS at 15:28

## 2020-01-01 RX ADMIN — MORPHINE SULFATE 2 MG: 2 INJECTION, SOLUTION INTRAMUSCULAR; INTRAVENOUS at 09:44

## 2020-01-01 RX ADMIN — MORPHINE SULFATE 2 MG: 2 INJECTION, SOLUTION INTRAMUSCULAR; INTRAVENOUS at 15:02

## 2020-01-01 RX ADMIN — LORAZEPAM 1 MG: 2 INJECTION INTRAMUSCULAR; INTRAVENOUS at 14:23

## 2020-01-01 RX ADMIN — LORAZEPAM 1 MG: 2 INJECTION INTRAMUSCULAR; INTRAVENOUS at 16:27

## 2020-01-01 RX ADMIN — IPRATROPIUM BROMIDE 2 PUFF: 17 AEROSOL, METERED RESPIRATORY (INHALATION) at 20:24

## 2020-01-01 RX ADMIN — Medication 10 ML: at 05:24

## 2020-01-01 RX ADMIN — HYDROMORPHONE HYDROCHLORIDE 1 MG: 1 INJECTION, SOLUTION INTRAMUSCULAR; INTRAVENOUS; SUBCUTANEOUS at 00:42

## 2020-01-01 RX ADMIN — DEXTROSE MONOHYDRATE 4 MCG/MIN: 50 INJECTION, SOLUTION INTRAVENOUS at 08:02

## 2020-01-01 RX ADMIN — SODIUM CHLORIDE 1000 ML: 900 INJECTION, SOLUTION INTRAVENOUS at 02:48

## 2020-01-01 RX ADMIN — LORAZEPAM 1 MG: 2 INJECTION INTRAMUSCULAR; INTRAVENOUS at 02:45

## 2020-01-01 RX ADMIN — Medication 10 ML: at 15:12

## 2020-01-01 RX ADMIN — HYDROMORPHONE HYDROCHLORIDE 2 MG: 2 INJECTION INTRAMUSCULAR; INTRAVENOUS; SUBCUTANEOUS at 13:02

## 2020-01-01 RX ADMIN — FAMOTIDINE 20 MG: 10 INJECTION INTRAVENOUS at 09:30

## 2020-01-01 RX ADMIN — METOPROLOL TARTRATE 2.5 MG: 5 INJECTION INTRAVENOUS at 12:47

## 2020-01-01 RX ADMIN — LORAZEPAM 1 MG: 2 INJECTION INTRAMUSCULAR; INTRAVENOUS at 21:46

## 2020-01-01 RX ADMIN — Medication 10 ML: at 06:57

## 2020-01-01 RX ADMIN — ALBUTEROL SULFATE 2 PUFF: 90 AEROSOL, METERED RESPIRATORY (INHALATION) at 08:16

## 2020-01-01 RX ADMIN — LORAZEPAM 1 MG: 2 INJECTION INTRAMUSCULAR; INTRAVENOUS at 13:58

## 2020-01-01 RX ADMIN — ALBUTEROL SULFATE 2 PUFF: 90 AEROSOL, METERED RESPIRATORY (INHALATION) at 21:13

## 2020-01-01 RX ADMIN — IPRATROPIUM BROMIDE 2 PUFF: 17 AEROSOL, METERED RESPIRATORY (INHALATION) at 01:07

## 2020-01-01 RX ADMIN — MORPHINE SULFATE 2 MG: 2 INJECTION, SOLUTION INTRAMUSCULAR; INTRAVENOUS at 01:38

## 2020-01-01 RX ADMIN — ALBUTEROL SULFATE 2 PUFF: 90 AEROSOL, METERED RESPIRATORY (INHALATION) at 20:28

## 2020-01-01 RX ADMIN — LORAZEPAM 1 MG: 2 INJECTION INTRAMUSCULAR; INTRAVENOUS at 04:22

## 2020-01-01 RX ADMIN — MORPHINE SULFATE 2 MG: 2 INJECTION, SOLUTION INTRAMUSCULAR; INTRAVENOUS at 10:48

## 2020-01-01 RX ADMIN — LORAZEPAM 1 MG: 2 INJECTION INTRAMUSCULAR; INTRAVENOUS at 05:50

## 2020-01-01 RX ADMIN — ALBUMIN (HUMAN) 50 G: 12.5 INJECTION, SOLUTION INTRAVENOUS at 19:06

## 2020-01-01 RX ADMIN — MORPHINE SULFATE 2 MG: 2 INJECTION, SOLUTION INTRAMUSCULAR; INTRAVENOUS at 05:34

## 2020-01-01 RX ADMIN — POTASSIUM CHLORIDE 10 MEQ: 7.46 INJECTION, SOLUTION INTRAVENOUS at 08:15

## 2020-01-01 RX ADMIN — SODIUM CHLORIDE, SODIUM LACTATE, POTASSIUM CHLORIDE, AND CALCIUM CHLORIDE 150 ML/HR: 600; 310; 30; 20 INJECTION, SOLUTION INTRAVENOUS at 06:50

## 2020-01-01 RX ADMIN — LORAZEPAM 1 MG: 2 INJECTION INTRAMUSCULAR; INTRAVENOUS at 00:06

## 2020-01-01 RX ADMIN — METOPROLOL TARTRATE 2.5 MG: 5 INJECTION INTRAVENOUS at 05:23

## 2020-01-01 RX ADMIN — CALCIUM GLUCONATE 2 G: 98 INJECTION, SOLUTION INTRAVENOUS at 05:15

## 2020-01-01 RX ADMIN — MORPHINE SULFATE 2 MG: 2 INJECTION, SOLUTION INTRAMUSCULAR; INTRAVENOUS at 04:05

## 2020-01-01 RX ADMIN — HYDROMORPHONE HYDROCHLORIDE 1 MG: 1 INJECTION, SOLUTION INTRAMUSCULAR; INTRAVENOUS; SUBCUTANEOUS at 04:30

## 2020-01-01 RX ADMIN — LORAZEPAM 1 MG: 2 INJECTION INTRAMUSCULAR; INTRAVENOUS at 01:39

## 2020-01-01 RX ADMIN — HYDROMORPHONE HYDROCHLORIDE 1.5 MG: 2 INJECTION INTRAMUSCULAR; INTRAVENOUS; SUBCUTANEOUS at 19:39

## 2020-01-01 RX ADMIN — ENOXAPARIN SODIUM 35 MG: 60 INJECTION SUBCUTANEOUS at 13:04

## 2020-01-01 RX ADMIN — GLYCOPYRROLATE 0.2 MG: 0.2 INJECTION, SOLUTION INTRAMUSCULAR; INTRAVENOUS at 09:16

## 2020-01-01 RX ADMIN — LORAZEPAM 1 MG: 2 INJECTION INTRAMUSCULAR; INTRAVENOUS at 20:52

## 2020-01-01 RX ADMIN — IPRATROPIUM BROMIDE AND ALBUTEROL SULFATE 3 ML: .5; 3 SOLUTION RESPIRATORY (INHALATION) at 08:22

## 2020-01-01 RX ADMIN — HYDROMORPHONE HYDROCHLORIDE 1 MG: 1 INJECTION, SOLUTION INTRAMUSCULAR; INTRAVENOUS; SUBCUTANEOUS at 14:23

## 2020-01-01 RX ADMIN — LORAZEPAM 1 MG: 2 INJECTION INTRAMUSCULAR; INTRAVENOUS at 11:31

## 2020-01-01 RX ADMIN — DEXAMETHASONE SODIUM PHOSPHATE 6 MG: 10 INJECTION, SOLUTION INTRAMUSCULAR; INTRAVENOUS at 01:01

## 2020-01-20 NOTE — PROGRESS NOTES
[facility] Neurology Clinics and [address] at [facility] Neurology Clinics at [address] [facility] [address] [place], [address] [facility] [address] [facility], [address] [place] [address]  
[phone] No chief complaint on file. Current Outpatient Medications Medication Sig Dispense Refill  memantine (NAMENDA) 10 mg tablet TAKE ONE TABLET BY MOUTH TWICE A DAY 60 Tab 10  
 donepezil (ARICEPT) 5 mg tablet Take one tablet po twice daily 180 Tab 1  
 QUEtiapine (SEROQUEL) 50 mg tablet Take 1 Tab by mouth nightly. 90 Tab 1  
 traZODone (DESYREL) 100 mg tablet TAKE ONE AND ONE-HALF TABLET BY MOUTH DAILY AND TAKE ONE-FOURTH TABLET BY MOUTH DAILY AS NEEDED FOR ANXIETY 180 Tab 1  pramipexole (MIRAPEX) 0.5 mg tablet Take 1 Tab by mouth three (3) times daily. 270 Tab 1  carbidopa-levodopa ER (RYTARY) 48. mg per capsule Take one cap po every five hours 120 Cap 3 Allergies Allergen Reactions  Alprazolam Rash Social History Tobacco Use  Smoking status: Never Smoker  Smokeless tobacco: Never Used Substance Use Topics  Alcohol use: Not Currently Frequency: Never  Drug use: Never Returns today for follow-up. He has Lewy body dementia progressive. Mago Orona was helping his parkinsonian symptoms as well as Mirapex. There is an issue with his insurance. He is currently on Namenda 10 mg twice daily, Aricept 5 mg twice daily Seroquel 50 mg nightly as well as Desyrel as needed anxiety and the Mirapex. He has not been sleeping as well. They are staying at his sister-in-law's house. His mother-in-law was killed last week in a home accident. Gradual decline in cognition. [facility]. Wife is looking at a  center.   Questions today regarding medications, sleeping, confusion, progression etc.  They did get the [employer] patient assistance program and they get the drug for free now. Examination Visit Vitals /70 Resp 20 Ht 5' 11\" (1.803 m) Wt 72.6 kg (160 lb) BMI 22.32 kg/m² He looks well. He looks as though he is lost weight. Awake. Masked feces. Bradykinetic. Rest tremor bilateral upper extremities. Paucity of spontaneous speech and he will talk about things that really are not germane Impression/Plan Lewy body dementia progressive. Increase Seroquel to a dose of 50 mg nightly. Continue current regimen. Follow-up about 6 months Total time: 25 min Counseling / coordination time: 20 min  
> 50% counseling / coordination?: Yes re: as documented above Handy Jimenez MD 
 
 
This note was created using voice recognition software. Despite editing, there may be syntax errors. This note will not be viewable in [address]

## 2020-01-21 NOTE — TELEPHONE ENCOUNTER
Reji Fess calling [phone] to make sure that pt is suppose to take 100mg @ night  seroquel tab . pleae advise

## 2020-01-22 NOTE — TELEPHONE ENCOUNTER
LVM stating seroquel was increased to 100 mg, [place] rx sent to [facility] and pt may take 2 tablets of the 50 mg strength until new rx comes in

## 2020-02-03 NOTE — TELEPHONE ENCOUNTER
Steve Viveros (wife) stated that the pt has been having terrible anxiety for the last 2 days  Wife request to speak with the nurse

## 2020-02-04 NOTE — TELEPHONE ENCOUNTER
Episodes of disorientation and agitation over the past few days. Previously was able to connect with pt and calm him down however last night, was not able to. Legs were very tight, pt was very restless, paranoid. Pt has always been very passive and calm but yesterday, he was getting to the point that pt's wife was uncomfortable. She would like to know if there  Was another medication she could give him for the acute agitation episodes. Advised her I would send message to Dr. Alley Zayas and if she does feel he is getting dangerous, she could take him to [facility] as there is a psych unit there if it is warranted. She agrees.

## 2020-02-11 NOTE — TELEPHONE ENCOUNTER
Per Dr. Genevieve Proctor, may add Seroquel 25 mg one tab Q6H prn agitation in addition to the 100 mg at night. S/w pt's wife and discussed recommendations. She agrees to plan.

## 2020-02-11 NOTE — TELEPHONE ENCOUNTER
----- Message from Rubi Posadas sent at 2/11/2020  2:53 PM EST -----  Regarding: /Telephone  General Message/Vendor Calls    Caller's first and last name: Romeo Camejo      Reason for call: requesting answer to question concerning pt medication       Callback required yes/no and why: yes      Best contact number(s):       Details to clarify the request:Pt wife called requesting a call back from Kamilah Barton in regards to pt medication adjustment .        Rubi Posadas

## 2020-04-21 NOTE — TELEPHONE ENCOUNTER
Patient's wife said he has been having more body pain. She gives him Trazadone but it doesn't seem to help him much. She would like a call back with advice on what else she can do.

## 2020-04-21 NOTE — TELEPHONE ENCOUNTER
S/w Mrs. Talamantes, appears to be in pain during the night, will moan and become agitated, very fidgety. She says it appears as though he is having spasms. Pt is taking trazodone to help with sleep but \"is not helping with pain so he is not able to relax\". She would like to know what she may do to help control this. Advised her I will forward msg to Dr. Maria T Molina and in mean time as long as he is able to tolerate, may give Ibup 200 mg 2 tabs with food to see if this may help. She states she will try this evening and will wait for Dr. Maria T Molina' recommendation.

## 2020-04-22 NOTE — TELEPHONE ENCOUNTER
----- Message from Branden Desir sent at 4/22/2020  3:45 PM EDT -----  Regarding: Dr Escobedo/laina  General Message/Vendor Calls    Caller's first and last name:      Reason for call confirm if pt new pain medication tramadol  [facility]  on [address] same one on file       Callback required yes/no and why:yes to confirm was called in       Best contact number(s):[phone]      Details to clarify the request:it was discussed today  with Latanya Rader and she said she would have Dr Jelena Rizvi write rx so she can call it in today ,he is in a lot of pain and will need it to get thru the night       Branden Desir

## 2020-04-24 NOTE — TELEPHONE ENCOUNTER
Order placed for Tramadol 50mg, 30 tablets with 1 refill PO per Verbal Order from Dr. Crow Berger on 4/24/2020 due to pain. Called [facility] pharmacy and gave verbal order for Ultram 50mg per Dr. Crow Berger. Patient is to take 1 tablet at night as needed for pain max daily amount 50mg. Disp: 30 tablets with 1 refill. Pharmacy read back was performed.

## 2020-05-13 NOTE — PROGRESS NOTES
Rodrigo We-07-A 1498 13 [facility] Alicja Parkinson [address]  
[address] [id number] Fax Susi Ryan is a 76 y.o. male who was seen by synchronous (real-time) audio-video technology on 5/13/2020. Consent: 
He and/or his healthcare decision maker is aware that this patient-initiated Telehealth encounter is a billable service, with coverage as determined by his insurance carrier. He is aware that he may receive a bill and has provided verbal consent to proceed: Yes I was in the office while conducting this encounter. Subjective:  
Susi Ryan was seen for Follow-up (virtual visit--c/o increased anxiety and confusion. Participating in [id number] 29-year-old gentleman who is seen today in follow-up. He has advancing Lewy body type dementia. His wife provides history today. Last visit was January 20. He has been maintained on Namenda and Aricept. Continue decline. He is on Rytary. We have been increasing his Seroquel. Recently wife called noting she felt that he was in pain. We prescribe some Ultram.  The patient is seen today with his wife and his son. They provide history. Today they report he has continued to decline. This of concerns. He has become more agitated. He is not sleeping. He has more stiffness and difficulty with mobility. They have joined the pace program.  He will be going to pace once they reopen. He has been seen by the pace doctor by virtual visit. His wife is not sleeping. He is tolerating meds. His trazodone was increased to a dose of 300 mg at night. He is still not sleeping. He has bouts of anxiety. This seems to come on near the dose and of Rytary. No hallucinations. Sometimes quite agitated. Muscle seems stiff. No fall. No fever. No chills. He is incontinent. He wears depends.   He still will get up to go to the bathroom. Increasing ADL needs. Multiple questions today from his wife regarding medications, decline, strategies, as well as her search for long-term care. Her son also had several questions today about decline, muscle stiffness, dopamine, the disease process, what to expect next, expectations from medication etc.  All these discussed today at length Prior to Admission medications Medication Sig Start Date End Date Taking? Authorizing Provider  
traMADoL (ULTRAM) 50 mg tablet Take 1 Tab by mouth nightly as needed for Pain for up to 30 days. Max Daily Amount: 50 mg. 4/24/20 5/24/20 Yes Felipe Escobedo MD  
carbidopa-levodopa ER (Rytary) 48. mg per capsule TAKE ONE CAPSULE BY MOUTH EVERY 5 HOURS 4/16/20  Yes Felipe Escobedo MD  
memantine (NAMENDA) 10 mg tablet TAKE ONE TABLET BY MOUTH TWICE A DAY 2/11/20  Yes Felipe Escobedo MD  
QUEtiapine (SEROQUEL) 25 mg tablet Take 1 Tab by mouth every six (6) hours as needed (agititation). 2/11/20  Yes Felipe Escobedo MD  
QUEtiapine (SEROQUEL) 100 mg tablet Take 1 Tab by mouth nightly. 1/22/20  Yes Sun Briseno MD  
donepezil (ARICEPT) 5 mg tablet Take one tablet po twice daily 12/19/19  Yes Sun Briseno MD  
traZODone (DESYREL) 100 mg tablet TAKE ONE AND ONE-HALF TABLET BY MOUTH DAILY AND TAKE ONE-FOURTH TABLET BY MOUTH DAILY AS NEEDED FOR ANXIETY 12/19/19  Yes Felipe Escobedo MD  
pramipexole (MIRAPEX) 0.5 mg tablet Take 1 Tab by mouth three (3) times daily. 12/19/19  Yes Sun Briseno MD  
 
Allergies Allergen Reactions  Alprazolam Rash ROS Examination He appears well cared for. He is appropriately dressed and groomed. He says hello. He sits oblivious to most of our conversation. Due to this being a TeleHealth evaluation, many elements of the physical examination are unable to be assessed. Impression/Plan Lewy body dementia with continued decline.   We discussed that some of these periods of perceived anxiety and perceived pain by the patient may actually be nonmotor off symptoms of dopamine deficiency and to that end we are going to increase his Mirapex from 0.5 mg 3 times daily up to 1.5 mg 3 times daily. She has a 0.5 mg tablets so he will start taking 3 of those tablets 3 times daily. I was going to send a prescription but she notes that she needs to get those through the pace program now that he is enrolled in that so I will have my nurse try to help facilitate that. In terms of the difficulties with sleep we specifically discussed that while she has been trying to give him Rytary every 5 hours there is really no need for her to wake him up at night to give him Rytary and it certainly makes more sense for both of them to try to get a good night sleep and he does have frequent awakenings already and if he wakes up and she feels like he needs the dose of Rytary is fine to give it to him. But to try to help him sleep more soundly we will increase Seroquel to a dose of 150 mg nightly. Again she will use the combination of tablets she has and get the higher prescription through [facility] We also discussed for periods of agitation she has 25 mg tablets and he is already doing 25 mg at noon but certainly if he gets into a severely agitated state she can use 25 mg every 6 hours as needed. Discussed that he continues to decline and I agree that 24-hour placement is in order and she is on several wait list for this. We also discussed the fact that she needs to take care of herself in order to be able to take care of him. Hopefully when the COVID restrictions ease and he is able to go to the day program over at [facility] 5 days a week that will give her some respite. Questions and concerns answered today at length and this extended appointment today. I have asked her to check in in about 3 weeks or so to see how these changes have done. Pursuant to the emergency declaration under the [facility] waiver authority and the [employer] Act, this Virtual  Visit was conducted, with patient's consent, to reduce the patient's risk of exposure to COVID-19 and provide continuity of care for an established patient. Services were provided through a video synchronous discussion virtually to substitute for in-person clinic visit. Velta Burkitt, MD 
  
Total time: 40 min Counseling / coordination time: 30 min  
> 50% counseling / coordination?: Yes re: as documented above This document was created with the assistance of voice recognition software and therefore unintended syntax errors may be present despite editing. For any questions please contact me directly. This note will not be viewable in [address]

## 2020-05-13 NOTE — PROGRESS NOTES
Chief Complaint Patient presents with  Follow-up  
  virtual visit--c/o increased anxiety and confusion. Participating in [address] [email or web address].

## 2020-05-14 NOTE — TELEPHONE ENCOUNTER
----- Message from Lalit Owen sent at 5/14/2020 10:23 AM EDT -----  Regarding: Dr Wilson Aguilar first and last name: Isela Carreon, pts wife      Reason for call:calling back to provide  the name of her  PCP at [facility]  after the virtual visit her  had with Dr Saritha Mccall yesterday it is Dr Rafy Subramanian   (h) [phone] required yes/no and why:no only if there are any other questions      Best contact number(s):[phone]      Details to clarify the request:pt's wife said all medications  need to go thru [address] [place] [address] if any changes  and refills no longer call [facility] any longer all rx go thru this  At [address] [place] [address] , and she wants to let Dr Saritha Mccall now that her  slept last night 9:00pm - 4:00am, thanks again for the change in  his medication, she will keep Dr Saritha Mccall posted in 3 weeks       Lalit Owen

## 2020-05-14 NOTE — TELEPHONE ENCOUNTER
----- Message from Lazaro Hernandez sent at 5/14/2020 10:23 AM EDT -----  Regarding: Dr Josey Baldwin first and last name: Ed Zacarias, pts wife      Reason for call:calling back to provide  the name of her  PCP at [facility]  after the virtual visit her  had with Dr Jer Small yesterday it is Dr Mohan Sandy   ([phone]) [phone] required yes/no and why:no only if there are any other questions      Best contact number(s):[phone]      Details to clarify the request:pt's wife said all medications  need to go thru [facility] if any changes  and refills no longer call [facility] any longer all rx go thru this  At [facility] , and she wants to let Dr Jer Small now that her  slept last night 9:00pm - 4:00am, thanks again for the change in  his medication, she will keep Dr Jer Small posted in 3 weeks       Lazaro Hernandez

## 2020-05-14 NOTE — TELEPHONE ENCOUNTER
Called and spoke with Dr. Erica Primrose. He states he just wants Dr. Briana Rodriguez' last office note faxed to [phone]. Routed in [address] [facility].

## 2020-08-19 NOTE — TELEPHONE ENCOUNTER
Pt's spouse is calling to let us know that pt is declining  Please call Adali Day at [phone] at [facility]

## 2020-08-20 NOTE — TELEPHONE ENCOUNTER
Patient is at [facility] and needs an appointment scheduled with Provider. They do not have a neurologist on staff at [facility] . Caller says there is a certain protocol for scheduling appointments. She says the  at [facility] has called for a week and has faxed to get an appointment scheduled for patient, but has \"gotten zero response\" from our office. She voices concern about the response time for requests she has made in the past in regard to medications and appointments.  Please Estela Marie, [place] at [facility]  [phone]

## 2020-08-20 NOTE — TELEPHONE ENCOUNTER
NASEEM to call office with Tatianna Paniagua. Pt is with [facility] since 6/10/20. Dr. Bharat Aguillon, physician there, would like to have Dr. Bean Whitfield give advice on how to care for pt with LBD as staff is not as familiar with this disease.   Pt does have upcoming appt on 8/27/20 for f/u

## 2020-08-21 NOTE — TELEPHONE ENCOUNTER
LVM with Yaneth Daly letting her know that pt has appt with Dr. Pavan Ontiveros at [facility] on 8/27/20 at 3 pm.

## 2020-08-27 NOTE — PROGRESS NOTES
Chief Complaint Patient presents with  Dementia LBD  
 
  
Pt is with [facility] since 6/10/20. Dr. Tiarra Daniel, physician there, would like to have Dr. Gold Lindsay give advice on how to care for pt with LBD as staff is not as familiar with this disease.

## 2020-08-27 NOTE — PROGRESS NOTES
[facility] and [address]aman at [facility] Neurology Clinics at [id number] [facility] [place], [address] [facility] [address] [facility], [address] [place] [address]  
[phone] Chief Complaint Patient presents with  Dementia LBD Current Outpatient Medications Medication Sig Dispense Refill  pramipexole (MIRAPEX) 1 mg tablet Take 1 mg by mouth three (3) times daily.  traZODone (DESYREL) 150 mg tablet Take 150 mg by mouth nightly.  traZODone (DESYREL) 50 mg tablet Take 50 mg by mouth two (2) times a day. Indications: major depressive disorder  QUEtiapine (SEROquel) 50 mg tablet Take 3 Tabs by mouth nightly. 270 Tab 3  carbidopa-levodopa ER (Rytary) 48. mg per capsule TAKE ONE CAPSULE BY MOUTH EVERY 5 HOURS 120 Cap 2  
 memantine (NAMENDA) 10 mg tablet TAKE ONE TABLET BY MOUTH TWICE A DAY 60 Tab 10  QUEtiapine (SEROQUEL) 25 mg tablet Take 1 Tab by mouth every six (6) hours as needed (agititation). 360 Tab 1  
 donepezil (ARICEPT) 5 mg tablet Take one tablet po twice daily 180 Tab 1 Allergies Allergen Reactions  Alprazolam Rash Social History Tobacco Use  Smoking status: Never Smoker  Smokeless tobacco: Never Used Substance Use Topics  Alcohol use: Not Currently Frequency: Never  Drug use: Never 43-year-old man with Lewy body dementia comes today with his wife for follow-up. He has been in a progressive decline. He has gotten into long-term care. He is on Aricept and Namenda. He is on Rytary. He is on the Seroquel as well as Mirapex and trazodone. Wife comes today to review thanks since he has transition to long-term care. Kylie Payne He is at [facility] in memory care. Eating well. He is active with staff. He continues to do push-ups daily.   Samy Zamudio, his caregiver, who accompanies him today gives a good report in terms of him interacting with staff and making a good transition. They are locking out all visitors so his wife has not been able to visit. She has dropped off puzzles. Sometimes will have these episodes where he says he is shaking and he will just go lay down and things get better. He says he feels shaky inside. No focal deficits. He is not been ill. Bhumika Chapo notes he has been eating and drinking well. Examination Visit Vitals BP 98/76 (BP 1 Location: Left arm, BP Patient Position: Sitting) Pulse 79 Temp 97.4 °F (36.3 °C) (Axillary) Resp 16 SpO2 98% He is awake and alert. He has a paucity of spontaneous speech. He is not oriented. He does recognize his wife on the iPad and she joins us by face time today. He does not answer orientation questions. He follows simple commands but needs redirection at times. He has full versions. This is masked. No tremor. Cogwheeling at the wrist.  Bradykinetic. Slight hunch with his gait and he is steady with a good stride. No arm swing. . 
Impression/Plan Lewy body dementia with continued decline but he is made a good transition. Discussed this. Discussed that keeping him active and with the folks over at the home was a good thing for him. Answered multiple questions from his wife today. Discussed with Bhumika Cowan that this is really like dealing with someone who has Parkinson's and dementia. He clearly understood that. We will continue the same medicine regimen for now and we discussed that while he is having these episodes where he says he feels shaky inside and it could potentially be a so-called off type phenomenon were going to just take a wait-and-see approach for now as there not very bothersome and they are self-limited. If they increase in frequency or severity then I would go ahead and increase his Mirapex up to a dose of 1.5 mg 3 times daily from the current 1 mg 3 times daily.   Answered good and multiple questions today. He will follow after the holidays. Niall Thomson MD 
 
Total time: 30 min Counseling / coordination time: 20 min  
> 50% counseling / coordination?: Yes re: as documented This note was created using voice recognition software. Despite editing, there may be syntax errors. This note will not be viewable in [address]

## 2020-11-11 NOTE — TELEPHONE ENCOUNTER
Dr. Rossy Lambert calling stating pt is having possible muscle spams-sometimes lasting a few minutes to hours. He is requesting a call back to discuss w/ Dr. Brendon Myles. He is wondering if it's pt's Lewy body dementia progressing.  Please call back

## 2020-11-19 NOTE — TELEPHONE ENCOUNTER
----- Message from Lucille Perez sent at 11/19/2020 11:44 AM EST -----  Regarding: Dr. Michael Lombardo Message/Vendor Calls    Caller's first and last name: Herman Cotton (Spouse)      Reason for call: Medical records       Callback required yes/no and why: N      Best contact number(s): [phone]      Details to clarify the request:  Mrs. Riley Ribera will be coming to the office tomorrow, 11/20/2020, to sign the medical release form for the patient (she is POA) and to request the records be sent to his new provider. She asked that the office be aware that she was coming in to do this tomorrow.         Lucille Perez

## 2020-12-03 PROBLEM — E86.0 SEVERE DEHYDRATION: Status: ACTIVE | Noted: 2020-01-01

## 2020-12-03 PROBLEM — A41.9 SEPTIC SHOCK (HCC): Status: ACTIVE | Noted: 2020-01-01

## 2020-12-03 PROBLEM — U07.1 SEPSIS DUE TO COVID-19 (HCC): Status: ACTIVE | Noted: 2020-01-01

## 2020-12-03 PROBLEM — R65.21 SEPTIC SHOCK (HCC): Status: ACTIVE | Noted: 2020-01-01

## 2020-12-03 PROBLEM — N18.4 KIDNEY DISEASE, CHRONIC, STAGE IV (SEVERE, EGFR 15-29 ML/MIN) (HCC): Chronic | Status: ACTIVE | Noted: 2020-01-01

## 2020-12-03 PROBLEM — N17.9 ACUTE KIDNEY INJURY SUPERIMPOSED ON CKD (HCC): Status: ACTIVE | Noted: 2020-01-01

## 2020-12-03 PROBLEM — E86.0 DEHYDRATION WITH HYPERNATREMIA: Status: ACTIVE | Noted: 2020-01-01

## 2020-12-03 PROBLEM — A41.89 SEPSIS DUE TO COVID-19 (HCC): Status: ACTIVE | Noted: 2020-01-01

## 2020-12-03 PROBLEM — N18.9 ACUTE KIDNEY INJURY SUPERIMPOSED ON CKD (HCC): Status: ACTIVE | Noted: 2020-01-01

## 2020-12-03 PROBLEM — E87.0 DEHYDRATION WITH HYPERNATREMIA: Status: ACTIVE | Noted: 2020-01-01

## 2020-12-03 NOTE — PROGRESS NOTES
Pharmacist Note - Vancomycin Dosing    Consult provided for this 76 y.o. male for indication of pneumonia. Antibiotic regimen(s): *vancomycin and zosyn  Patient on vancomycin PTA? NO     Recent Labs     20  0134   WBC 12.7*   CREA 2.93*   BUN 60*     Frequency of BMP: daily  Height: 170.2 cm  Weight: 70.3 kg  Est CrCl: ~23 ml/min  Temp (24hrs), Av.1 °F (37.8 °C), Min:99.7 °F (37.6 °C), Max:100.4 °F (38 °C)    Cultures:  12/3 blood    Goal trough = 15 - 20 mcg/mL    Therapy will be initiated with a loading dose of 1750 mg IV x 1. Pharmacy to follow patient daily and order levels / make dose adjustments as appropriate.

## 2020-12-03 NOTE — PROGRESS NOTES
LUCHO  Patient admitted from [facility] memory care unit with fever, sat's 91 %and decreased LOC. Admit: COVID positive, Septic Shock  RUR 15%  Disposition: [facility] at [facility]                       PCP: First and Last name:  Dr Earnest Homans   Name of Practice: [facility]   Are you a current patient: Yes/No: Yes   Approximate date of last visit:    Can you participate in a virtual visit with your PCP: No                    Current Advanced Directive/Advance Care Plan: Not on file                    Patient admitted to ICU , [facility]. Care manager spoke with wife Jeannie Huston [phone] to introduce self and explain role. Patient has a history of Lewy body dementia and resides in the memory care unit at [facility]. Per wife patient has had a decline since early November. For the past several days he has been bedbound only consuming protein drinks. CM spoke with SW at [facility] and faxed requested clinical to her [phone]. Plan will be for patient to go to Health Care at discharge. Boyd Paul RN,Care Management   Care Management Interventions  PCP Verified by CM:  Yes  Transition of Care Consult (CM Consult): (Resides in Memory care unit at [facility])  MyChart Signup: Yes  Discharge Durable Medical Equipment: No  Physical Therapy Consult: No  Occupational Therapy Consult: No  Speech Therapy Consult: Yes  Current Support Network: (wife Jeannie Huston [phone])

## 2020-12-03 NOTE — CONSULTS
Comprehensive Nutrition Assessment    Type and Reason for Visit: Initial    Nutrition Recommendations/Plan:    1. Continue checks of Phos and Mg.   2. Initate TwoCal [employer], increase by 10mL q12hr until goal rate of 25mL/hr achieved. Flush with 300mL q3hr (per NP) and add [employer] bid. 3. Recommend supplement of vit. C, vit. D, zinc per COVID recommendations. Nutrition Assessment:    Pt admitted with septic shock. Pt has PMHx of lewy body dementia, asthma, and musculoskeletal disorder. Pt on high-flow cannula and on low dose of pressors. Pt wife stated he has had a decreased appetite for the past month and has been drinking \"protein drinks. \" Wife has not seen patient since Nov. 3 and is therefore unsure of weight loss since then. Wt history record reveals minimal weight loss. Unable to complete NFPE as patient is COVID+ but visually looks adequately nourished. Noted lytes imbalance. Pt is now receiving IVF + KCl to address high Na and low K. Mg slightly elevated. Will continue to monitor. Recommend initate TwoEliezer Thania@[employer] via NG tube, increase by 10mL q12hr until goal rate of 25mL achieved. Flush with 300mL q3hr and add [employer] bid. Goal provides 600mL, 1320kcal, 80g protein, 2820mL fluid (420mL from TwoCal + 2400mL from flushes). Malnutrition Assessment:  Malnutrition Status:   At risk for malnutrition (due to poor intake PTA)    Context:  Acute illness     Findings of the 6 clinical characteristics of malnutrition:   Energy Intake:  Mild decrease in energy intake (poor intake noted by wife)  Weight Loss:  No significant weight loss     Body Fat Loss:  Unable to assess,     Muscle Mass Loss:  Unable to assess,    Fluid Accumulation:  No significant fluid accumulation,     Strength:  Not performed     Nutritionally Significant Medications: Colace, Pepcid, Humalog, Levophed, Zofran [PRN], Zosyn, IV KCl, vancomycin, senna    Estimated Daily Nutrient Needs:  Energy (kcal): 7598-8924XZGI(20-06YPRN/LT); Weight Used for Energy Requirements: Current(70.3kg)  Protein (g): 84-140g(1.2-2g/kg (COVID+ rec)); Weight Used for Protein Requirements: Current  Fluid (ml/day): 1500mL; Method Used for Fluid Requirements: 1 ml/kcal    Nutrition Related Findings:       BM: PTA  Edema: None  Wounds:  None noted. Current Nutrition Therapies:   Diet: NPO  Tube Feed: Nepro @ 40ml, cont. W/300mL flush q3hr  Additional Caloric Sources: Levo in D5-250mL(provides minimal energy)    Meal intake: No data found.     Anthropometric Measures:  · Height:  5' 7\" (170.2 cm)  · Current Body Wt:  70.3 kg (154 lb 15.7 oz)   · Admission Body Wt:  154 lb 15.7 oz    · Usual Body Wt:      unsure  · Ideal Body Wt: 146lb   :    106%  · BMI:     24.3  · BMI Categories:  Normal weight (BMI 22.0-24.9) age over 72     Wt Readings from Last 10 Encounters:   12/03/20 70.3 kg (155 lb)   08/27/20 70.8 kg (156 lb)   01/20/20 72.6 kg (160 lb)   10/24/19 70.8 kg (156 lb)   09/13/19 71.2 kg (157 lb)   07/31/19 70.8 kg (156 lb)   05/29/19 74.2 kg (163 lb 8 oz)   03/28/19 75.5 kg (166 lb 8 oz)   12/27/18 73.7 kg (162 lb 8 oz)     Last 3 Recorded Weights in this Encounter    12/03/20 0124   Weight: 70.3 kg (155 lb)       Nutrition Diagnosis:   · Inadequate oral intake related to catabolic illness, impaired respiratory function, increased demand for energy/nutrients as evidenced by NPO or clear liquid status due to medical condition, poor intake prior to admission    Nutrition Interventions:   Food and/or Nutrient Delivery: Start tube feeding  Nutrition Education and Counseling: No recommendations at this time  Coordination of Nutrition Care: Continue to monitor while inpatient, Swallow evaluation, Interdisciplinary rounds    Goals:  initiation of TF within 24 hours; pt will tolerate TF at goal rate in 3-5 days       Nutrition Monitoring and Evaluation:   Behavioral-Environmental Outcomes: None identified  Food/Nutrient Intake Outcomes: Enteral nutrition intake/tolerance  Physical Signs/Symptoms Outcomes: Biochemical data, Weight    Discharge Planning: Too soon to determine     Electronically signed by Melody Lindsay MS, NDTR, Dietetic Intern, on 12/3/2020 at 12:35 PM    Contact:  Zack Morrow RD, [facility], via [employer]

## 2020-12-03 NOTE — PROGRESS NOTES
TRANSFER - IN REPORT:    Verbal report received from Lucrecia Peck [facility] (name) on [facility].  being received from ED(unit) for routine progression of care      Report consisted of patients Situation, Background, Assessment and   Recommendations(SBAR). Information from the following report(s) SBAR, Kardex, ED Summary, Procedure Summary, Intake/Output, MAR, Accordion and Recent Results was reviewed with the receiving nurse. Opportunity for questions and clarification was provided. Assessment completed upon patients arrival to unit and care assumed. 0630: Patient received to unit. Admission and skin assessment performed. 0700: Settled patient to unit. Bath and mouth care performed. Verified medications/gtts, LR, albumin, and potassium running. 0715: Levo on standby. Messaged pharmacy about vanc. bloog glucose 141- passing on to oncoming RN to question insulin order while patient is NPO (does not yet have NGT/is not yet on TF). High flow weaning    Dual Skin Assessment : Primary Nurse Elijah Romero and Erma Patel, RN, RN performed a dual skin assessment on this patient No impairment noted  Sunny score is 12      0730: Bedside and Verbal shift change report given to Imelda Booth RN (oncoming nurse) by Elijah Romero RN (offgoing nurse). Report included the following information SBAR, Kardex, ED Summary, Intake/Output, MAR, Accordion, Recent Results and Cardiac Rhythm NSR.      Patient currently on high flow 40L, 95% FIO2

## 2020-12-03 NOTE — H&P
[facility]    ICU Intensivist- Critical Care Progress Note    Name: Angel Anderson. : 1955   MRN: [id number]   Admit: 12/3/2020  1:18 AM      Diagnosis:     Principal Problem:    Septic shock ([address]    Problem List:  2020: Septic shock ([facility] [address]   acute kidney injury  Acute hypoxic respiratory failure  Leukocytosis  Alzheimer dementia  Electrolyte abnormality  Severe dehydration  Hypernatremia  Community-acquired pneumonia  COVID-19 infection      ICU Comprehensive Plan of Care:     Plans for this Shift:   1. Neurological -    Metabolic encephalopathy  Alzheimer's dementia    -GCS 8  -Continue as needed morphine for pain   -Patient has history of lews body dementia  -Patient has metabolic encephalopathy secondary to metabolic disturbance and acute kidney injury    2. Cardiovascular -    Septic shock  Hypotension.    -Patient started on IV norepinephrine drip for life-threatening hypotension  -Patient received a total of 4 L of IV fluids for resuscitation. Patient started on LR at 150 cc/h. -Cardiac enzymes negative  -Lactic acid pending  -DVT prophylaxis subcutaneous heparin  -Patient has life-threatening septic shock  -Continue to monitor vital signs every 15 minutes while on drips then hourly thereafter  -Goals maintain systolic blood pressure greater than 90    3. Pulmonary -    Acute hypoxic respiratory failure  Community-acquired pneumonia  COVID-19 infection    -Patient is unable to manage secretion and is severely dehydrated.   Chest x-ray shows left lower lobe pneumonia.  -Patient continued on CPT every 4 hours per respiratory therapy  -Patient continued on guaifenesin 600 mg every 12 hours  -Would recommend NT suctioning by respiratory therapy as needed  -Repeat chest x-ray and ABG in a.m.  -ABG pending  -Patient currently on nonrebreather for acute hypoxic respiratory failure  -Patient is a DNI    4.  GI -    -We will place NG tube  -Continue free water flushes every 3 hours 300 cc  -Continue PPI  -Continue bowel regimen  -Continue as needed Zofran for any nausea  -Dietary consult in place for management of tube feedings  -Patient has failure to thrive    5.  -    Acute kidney injury  UTI    -Creatinine 2.93, BUN 60 patient has acute kidney injury  -CK enzymes pending  -We will continue maintenance IV fluids  -Consult nephrology if needed  -Continue to monitor renal panel  -Patient is hypokalemic with potassium 3.1, IV replacement given  -Continue to trend hyponatremia, sodium 156  -Calcium 7.8, patient has hypocalcemia IV replacement given  -Patient may have mild urinary tract infection    6. Endocrine -    -Continue insulin sliding scale protocol  -Unknown if patient has history of diabetes  -Continue Accu-Cheks every 4 hours  -Continue hypoglycemia protocol  -Patient started on Decadron 6 mg every 24 hours      7. Hematology/oncology -    -Currently no signs of bleeding  -Hemoglobin 13.8, hematocrit 42.7  -Platelets 710  -PT/INR pending  -We will transfuse if hemoglobin less than 7    8. ID -    Sepsis  Leukocytosis  COVID-19 pneumonia    -Patient started on vancomycin/Zosyn for IV antibiotic therapy  -WBCs 12.7, continue to trend leukocytosis  -Cultures pending  -Patient may have mild urinary tract infection  -Afebrile  -CBC in a.m.  -Patient had recent history of Covid 19 infection from nursing home on November 18, 2020  -ID consult may be warranted to evaluate for discontinuation of droplet/contact/airborne precaution      Subjective:     Progress Note:   12/3/2020   5:26 AM     Reason for ICU Admission:   Septic shock ([facility] [address]   Acute kidney injury  Leukocytosis  Alzheimer dementia  Electrolyte abnormality  Severe dehydration  Hypernatremia  Community-acquired pneumonia  COVID-19 infection    HPI: Patient Duncan Morelos is a 42-year-old male seen and examined today by critical care services due to initial complaints of hypotension.   Patient was a transfer from [facility] home.  Patient has past medical history for asthma, dementia, musculoskeletal disorder. Patient presented with life-threatening hypotension and altered mental status from [facility]. Systolic blood pressure was in the 80s even with IV fluid resuscitation at 3 L. Patient had left central line placed for better IV access to right internal jugular with no complications. Chest x-ray shows adequate placement. No pneumothorax. Patient has life-threatening septic shock secondary to pneumonia. Patient was previously diagnosed with COVID-19 pneumonia on November 18. Current PCR pending. Patient started on vancomycin and Zosyn for IV antibiotic therapy. WBCs 12,000. Cardiac enzymes negative. Patient started on lactated Ringer's at 150 cc/h. Patient given additional liter bolus. Patient was hypokalemic with potassium 3.1. IV replacement given. Patient is hyponatremic with sodium 156. Patient will have NG tube placed and will start her on free water flushes at 300 cc every 3 hours. Dietary consult in place for management of tube feedings. Procalcitonin 0.65. . ER physician spoke with family regarding CODE STATUS the patient is a DNI. Family does want patient to receive CPR. Would recommend further conversation with the family regarding goals of care. Plan is to continue ICU support. Past medical history: Asthma, dementia, musculoskeletal disorder    Past surgical history: Noncontributory    Past family history: Dementia, heart disease, cancer, CVA    Past social history: Patient is from [facility]. Patient is  and wife is POA. Non-smoker, no EtOH use    Past Medical History:      has a past medical history of Asthma, Dementia ([facility] [address] Musculoskeletal disorder, and Stool color black. Past Surgical History:      has a past surgical history that includes hx other surgical (2012) and hx other surgical (2015).     Current Medications:     Current Facility-Administered Medications   Medication Dose Route Frequency    cefTRIAXone (ROCEPHIN) 1 gram injection        azithromycin (ZITHROMAX) 500 mg injection        dexamethasone (PF) (DECADRON) 10 mg/mL injection        0.9% sodium chloride (MBP/ADV) infusion        potassium chloride 10 mEq in 100 ml IVPB  10 mEq IntraVENous Q1H    calcium gluconate 2 g in 0.9% sodium chloride 100 mL IVPB  2 g IntraVENous ONCE    [START ON 12/4/2020] dexamethasone (PF) (DECADRON) 10 mg/mL injection 6 mg  6 mg IntraVENous Q24H    insulin lispro (HUMALOG) injection   SubCUTAneous Q4H    glucose chewable tablet 16 g  4 Tab Oral PRN    glucagon (GLUCAGEN) injection 1 mg  1 mg IntraMUSCular PRN    dextrose 10% infusion 0-250 mL  0-250 mL IntraVENous PRN    sodium chloride (NS) flush 5-40 mL  5-40 mL IntraVENous Q8H    sodium chloride (NS) flush 5-40 mL  5-40 mL IntraVENous PRN    acetaminophen (TYLENOL) tablet 650 mg  650 mg Oral Q6H PRN    Or    acetaminophen (TYLENOL) suppository 650 mg  650 mg Rectal Q6H PRN    ondansetron (ZOFRAN) injection 4 mg  4 mg IntraVENous Q6H PRN    famotidine (PF) (PEPCID) 20 mg in 0.9% sodium chloride 10 mL injection  20 mg IntraVENous DAILY    albuterol-ipratropium (DUO-NEB) 2.5 MG-0.5 MG/3 ML  3 mL Nebulization Q4H PRN    NOREPINephrine (LEVOPHED) 8 mg/250 mL (32 mcg/mL) in dextrose 5% 250 mL (32 mcg/mL) infusion        piperacillin-tazobactam (ZOSYN) 3.375 g in 0.9% sodium chloride (MBP/ADV) 100 mL MBP  3.375 g IntraVENous Q8H    lactated ringers bolus infusion 1,000 mL  1,000 mL IntraVENous ONCE    lactated Ringers infusion  150 mL/hr IntraVENous CONTINUOUS    Vancomycin Pharmacy to Dose   Other Rx Dosing/Monitoring    vancomycin (VANCOCIN) 1750 mg in  ml infusion  1,750 mg IntraVENous ONCE    NOREPINephrine (LEVOPHED) 8 mg in 5% dextrose 250mL (32 mcg/mL) infusion  0.5-16 mcg/min IntraVENous TITRATE     Current Outpatient Medications   Medication Sig    pramipexole (MIRAPEX) 1 mg tablet Take 1 mg by mouth three (3) times daily.  traZODone (DESYREL) 150 mg tablet Take 150 mg by mouth nightly.  traZODone (DESYREL) 50 mg tablet Take 50 mg by mouth two (2) times a day. Indications: major depressive disorder    QUEtiapine (SEROquel) 50 mg tablet Take 3 Tabs by mouth nightly.  carbidopa-levodopa ER (Rytary) 48. mg per capsule TAKE ONE CAPSULE BY MOUTH EVERY 5 HOURS    memantine (NAMENDA) 10 mg tablet TAKE ONE TABLET BY MOUTH TWICE A DAY    QUEtiapine (SEROQUEL) 25 mg tablet Take 1 Tab by mouth every six (6) hours as needed (agititation).  donepezil (ARICEPT) 5 mg tablet Take one tablet po twice daily       Allergies/Social/Family History: Allergies   Allergen Reactions    Alprazolam Rash      Social History     Tobacco Use    Smoking status: Never Smoker    Smokeless tobacco: Never Used   Substance Use Topics    Alcohol use: Not Currently     Frequency: Never      Family History   Problem Relation Age of Onset    Dementia Mother     Heart Disease Mother     Cancer Father     Stroke Brother     Stroke Other        Review of Systems:     Review of systems not obtained due to patient factors. Altered mental status/dementia    Objective:   Vital Signs:  Visit Vitals  BP 90/64   Pulse 88   Temp 99.7 °F (37.6 °C)   Resp 28   Ht 5' 7\" (1.702 m)   Wt 70.3 kg (155 lb)   SpO2 97%   BMI 24.28 kg/m²    O2 Flow Rate (L/min): 70 l/min O2 Device: Hi flow nasal cannula Temp (24hrs), Av.1 °F (37.8 °C), Min:99.7 °F (37.6 °C), Max:100.4 °F (38 °C)           Intake/Output:     Intake/Output Summary (Last 24 hours) at 12/3/2020 8605  Last data filed at 12/3/2020 8437  Gross per 24 hour   Intake 1050 ml   Output    Net 1050 ml       Physical Exam:    General:   Not alert, not cooperative, severe distress, appears stated age. Eyes:   Conjunctivae/corneas clear. PERRL, EOMs intact. Ears:   Normal external ear canals bilaterally.    Nose:   No drainage or sinus tenderness. Mouth/Throat:  Dry mucous membranes. Dentition poor   Neck:  Symmetrical, trachea midline, no JVD or carotid bruit. Back:    No CVA tenderness to percussion. Lungs:    Rhonchi to auscultation bilaterally. Diminished bases   Heart:   Regular rate and rhythm. S1, S2 normal, without murmur, click, rub or gallop. Abdomen:    Normoactive bowel sounds. Soft, non-tender, no masses or organomegaly. Extremities:  Atraumatic, no cyanosis or edema. Vascular:  Pulses 2+ and symmetric UE/LE bilat   Skin:  Normal color, non-diaphoretic, positive capillary refill (less than 4 seconds). No rashes or lesions. Lymph nodes:  No Lymphadenopathy. Neurologic:  CN II-XII intact. Normal strength. LABS AND  DATA: Personally reviewed  Recent Labs     12/03/20 0134   WBC 12.7*   HGB 13.8   HCT 42.7        Recent Labs     12/03/20 0134   *   K 3.1*   *   CO2 18*   BUN 60*   CREA 2.93*   *   CA 7.8*   MG 2.8*   PHOS 3.7     Recent Labs     12/03/20 0134   AP 86   TP 6.6   ALB 3.2*  3.0*   GLOB 3.6     No results for input(s): INR, PTP, APTT, INREXT in the last 72 hours. No results for input(s): PHI, PCO2I, PO2I, FIO2I in the last 72 hours. Recent Labs     12/03/20 0134   TROIQ <0.05  <0.05         Ventilator Settings:  Mode Rate Tidal Volume Pressure FiO2 PEEP            100 %       Peak airway pressure:      Minute ventilation:          MEDS: Reviewed    RADIOLOGY:  Ct Head Wo Cont    Result Date: 12/3/2020  IMPRESSION: No acute findings. Ct Chest Wo Cont    Result Date: 12/3/2020  IMPRESSION: Left lower lobe consolidation and volume loss. Obstructed left lower lobe bronchus. Findings are concerning for mucous plug. Xr Chest Port    Result Date: 12/3/2020  IMPRESSION: No acute findings.          Assessment:     Hospital Problems  Date Reviewed: 8/27/2020          Codes Class Noted POA    Septic shock ([facility] [address] ICD-10-CM: A41.9, R65.21  ICD-9-CM: 038.9, 785.52, 995.92 12/3/2020 Unknown                  Multidisciplinary Rounds Completed: Yes    ABCDEF Bundle/Checklist  Pain Medications: None  Target RASS: N/A  Sedation Medications: None  CAM-ICU:  Negative  Mobility: Bedrest  PT/OT: Speech therapy consulted and on board   Restraints: None needed at this time  Discussed Plan of Care (goals of care): No  Addressed Code Status: Partial Code    CARDIOVASCULAR  Cardiac Gtts: Norepinephrine  SBP Goal of: > 90 mmHg  MAP Goal of: > 65 mmHg  Transfusion Trigger (Hgb): <7 g/dL    RESPIRATORY  Vent Goals:   Head of bed > 30 degrees  DVT Prophylaxis (if no, list reason): SCD's or Sequential Compression Device and Heparin   SPO2 Goal: > 92%  Pulmonary toilet: Duo-Nebs     GI/  Cortez Catheter Present: Yes  GI Prophylaxis: Pepcid (famotidine)   Nutrition: Pending   IVFs: Lactated Ringer's at 150 cc an hour  Bowel Movement: N/A  Bowel Regimen: Senna and Docusate (Colace)  Insulin: Continue insulin sliding scale protocol    ANTIBIOTICS  Antibiotics:  Vancomycin  Zosyn    T/L/D  Tubes: Nasogastric Tube  Lines: Peripheral IV and Central Line  Drains: Cortez Catheter    SPECIAL EQUIPMENT  None    DISPOSITION  Stay in ICU    CRITICAL CARE CONSULTANT NOTE  I provided a face-to-face bedside physician/patient encounter, greater than the usual and customary amount normally needed, due to the high complexity of medical decision-making required. I reviewed and interpreted patient data including clinical events, labs, images, vital signs, I/O's, and examined patient. I have actively participated in multi-disciplinary discussions (nursing staff, radiology, laboratory) regarding the case in formulating an optimal therapeutic plan, and effecting a management strategy for this patient. NOTE OF PERSONAL INVOLVEMENT IN CARE   This patient has a high probability of imminent, clinically significant deterioration, which requires the highest level of preparedness to intervene urgently.  I participated in the decision-making and personally managed, or directed the management of, a myriad of life and organ supporting interventions which required my frequent-interval clinical reassessments, in order to treat or prevent imminent deterioration. I personally spent 60 minutes of critical care time. This is time spent at this critically ill patient's bedside actively involved in patient care as well as the coordination of care and discussions with the patient's family. This does not include any procedural time which has been billed separately.     Celestino Gore MSN, BSN, BS, FNP-BC, NP-C, CCRN-CMC   Staff Intensivist Nurse Practitioner  [phone]  12/3/2020

## 2020-12-03 NOTE — ED TRIAGE NOTES
Client from [facility], report decreased LOC ove past 3 days. O2 sat 91% on start of sfaff shit, but dropped to 88%. Has fever of 104 today, and febrile over past 4 days. NonverbaL.

## 2020-12-03 NOTE — PROGRESS NOTES
Admission Medication Reconciliation:    Information obtained from:  Medical Records from [address] [place] [address]:  YES    Comments/Recommendations: Updated PTA meds/reviewed patient's allergies. 1)  Records on media chart are dated 7/30/20. New records faxed dated 12/3/20. Spoke with Eden Avelar ([phone])    2)  Medication changes (since last review): Added  - Miralax, zinc, vit C, vitD, paxil, mirapex, flexeril, acetaminophen, bisacodyl, morphine    Adjusted  - seroquel and trazodone and namenda    Removed  - -       ¹[employer] pharmacy benefit data reflects medications filled and processed through the patient's insurance, however   this data does NOT capture whether the medication was picked up or is currently being taken by the patient. Allergies:  Alprazolam    Significant PMH/Disease States:   Past Medical History:   Diagnosis Date    Asthma     Dementia ([facility] [address]     lewy body dementia    Musculoskeletal disorder     Stool color black      Chief Complaint for this Admission:    Chief Complaint   Patient presents with    Shortness of Breath     Prior to Admission Medications:   Prior to Admission Medications   Prescriptions Last Dose Informant Taking? PARoxetine (PaxiL) 10 mg tablet   Yes   Sig: Take 15 mg by mouth daily. QUEtiapine (SEROqueL) 25 mg tablet   Yes   Sig: Take 25 mg by mouth daily. QUEtiapine SR (SEROquel XR) 150 mg sr tablet   Yes   Sig: Take 150 mg by mouth nightly. ZINC GLUCONATE PO   Yes   Sig: Take 100 mg by mouth daily. acetaminophen (TYLENOL) 325 mg tablet   Yes   Sig: Take 650 mg by mouth every six (6) hours as needed for Pain. acetaminophen (TYLENOL) 650 mg suppository   Yes   Sig: Insert 650 mg into rectum every six (6) hours as needed for Fever. ascorbic acid, vitamin C, (Vitamin C) 500 mg tablet   Yes   Sig: Take 1,000 mg by mouth daily.    bisacodyL (Dulcolax, bisacodyl,) 10 mg supp   Yes   Sig: Insert 10 mg into rectum daily as needed for Constipation. carbidopa-levodopa ER (Rytary) 48. mg per capsule   Yes   Sig: Take 1 Cap by mouth five (5) times daily. cholecalciferol (Vitamin D3) (1000 Units /25 mcg) tablet   Yes   Sig: Take 1,000 Units by mouth daily. cyclobenzaprine (FLEXERIL) 5 mg tablet   Yes   Sig: Take 5 mg by mouth every six (6) hours as needed for Muscle Spasm(s). donepezil (ARICEPT) 5 mg tablet   No   Sig: Take one tablet po twice daily   morphine (ROXANOL) 100 mg/5 mL (20 mg/mL) concentrated solution   Yes   Sig: Take 5 mg by mouth every four (4) hours as needed for Pain.   polyethylene glycol (Miralax) 17 gram packet   Yes   Sig: Take 17 g by mouth daily. pramipexole (MIRAPEX) 1 mg tablet   No   Sig: Take 1 mg by mouth three (3) times daily. pramipexole (Mirapex) 1.5 mg tablet   Yes   Sig: Take 1.5 mg by mouth nightly. traZODone (DESYREL) 150 mg tablet   No   Sig: Take 150 mg by mouth nightly. Facility-Administered Medications: None       Please contact the main inpatient pharmacy with any questions or concerns at [phone] and we will direct you to the clinical pharmacist covering this patient's care while in-house.    Jany Rivera

## 2020-12-03 NOTE — PROGRESS NOTES
Speech Pathology    Consult received and appreciated. Chart reviewed. Spoke with RN who stated patient is not yet appropriate for skilled swallowing assessment. He is not consistently responsive to voice, commands. +lewy body dementia, COVID +, HFO2 60L, FiO2 100%. Will continue to follow closely. Sussy Oconnell MS, CCC-SLP, BCS-S

## 2020-12-03 NOTE — PROGRESS NOTES
[facility]    ICU Intensivist- Critical Care Progress Note    Name: Farida Vogel : 1955   MRN: [id number]   Admit: 12/3/2020  1:18 AM      Diagnosis:     Principal Problem:    Septic shock due to [place] Novel Coronavirus (2019-nCoV) infection      Problem List:   Septic shock   Severe dehydration   Hypovolemic shock   Sepsis due to COVID-19   Dehydration with hypernatremia   Acute kidney injury superimposed on CKD   Kidney disease, chronic, stage IV (severe, EGFR 15-29 ml/min)   Parkinson's disease, Lewy body   Parkinson's disease dementia   Anxiety, generalized   Gait abnormality   Memory loss   Frail elderly    ICU Comprehensive Plan of Care:     Plans for this Shift:   1. Septic/Hypovolemic shock due to [place] novel coronavirus (2019nCoV) fraction- isotonic IV crystalloid fluid resuscitation initiated in Emergency Department. Will monitor strict RUSS's, serial electrolytes and renal indices. 2. Severe sepsis with acute organ dysfunction due to post viral bacterial healthcare associated left lower lobe pneumonia- empiric spectrum IV antibiotics (Zosyn vancomycin) started in the emergency department. Will consult Infectious Disease. 3. Bronchial plugging left lower lobe with atelectasis- aggressive tracheobronchial pulmonary toilet. 4. Parkinson's disease with severe dementia- family members to seek comfort measures, and therefore request DNR status. 5. Kidney injury superimposed upon chronic kidney disease (stage IV) and severe dehydration- isotonic IV crystalloid fluid resuscitation initiated in Emergency Department. Will monitor strict RUSS's, serial electrolytes and renal indices. Subjective:     Progress Note:   12/3/2020   6:27 PM     Reason for ICU Admission:   Septic shock    Farida Vogle Is a 76year-old elderly frail W/M with CKD (Stage IV), Parkinson's disease, Lewy body dementia, bradykinesia, gait imbalance, and cognitive decline.      Referred from [facility] with fever, unresponsiveness, Covid positive (due to the patient's wife exposure he tested positive on November 18). According to the nurse caring for him at [facility] he developed a fever around 11:00 this morning and was given Tylenol. When the current nurse arrived at 1030 she noted 104.8 fever and he was given another 650 mg of Tylenol at 10. According to the nurse he has been declining over the past few days and is essentially bedridden and had very little oral intake. Past Medical History:      has a past medical history of Asthma, Dementia ([facility] [address] Musculoskeletal disorder, and Stool color black. Past Surgical History:      has a past surgical history that includes hx other surgical (2012) and hx other surgical (2015).     Current Medications:     Current Facility-Administered Medications   Medication Dose Route Frequency    [START ON 12/4/2020] dexamethasone (PF) (DECADRON) 10 mg/mL injection 6 mg  6 mg IntraVENous Q24H    glucose chewable tablet 16 g  4 Tab Oral PRN    glucagon (GLUCAGEN) injection 1 mg  1 mg IntraMUSCular PRN    dextrose 10% infusion 0-250 mL  0-250 mL IntraVENous PRN    sodium chloride (NS) flush 5-40 mL  5-40 mL IntraVENous Q8H    sodium chloride (NS) flush 5-40 mL  5-40 mL IntraVENous PRN    acetaminophen (TYLENOL) tablet 650 mg  650 mg Oral Q6H PRN    Or    acetaminophen (TYLENOL) suppository 650 mg  650 mg Rectal Q6H PRN    ondansetron (ZOFRAN) injection 4 mg  4 mg IntraVENous Q6H PRN    famotidine (PF) (PEPCID) 20 mg in 0.9% sodium chloride 10 mL injection  20 mg IntraVENous DAILY    albuterol-ipratropium (DUO-NEB) 2.5 MG-0.5 MG/3 ML  3 mL Nebulization Q4H PRN    piperacillin-tazobactam (ZOSYN) 3.375 g in 0.9% sodium chloride (MBP/ADV) 100 mL MBP  3.375 g IntraVENous Q8H    lactated Ringers infusion  150 mL/hr IntraVENous CONTINUOUS    Vancomycin Pharmacy to Dose   Other Rx Dosing/Monitoring    NOREPINephrine (LEVOPHED) 8 mg in 5% dextrose 250mL (32 mcg/mL) infusion  0.5-16 mcg/min IntraVENous TITRATE    guaiFENesin ER (MUCINEX) tablet 600 mg  600 mg Oral Q12H    morphine injection 2 mg  2 mg IntraVENous Q4H PRN    docusate (COLACE) 50 mg/5 mL oral liquid 100 mg  100 mg Oral BID    sennosides (SENOKOT) 8.8 mg/5 mL syrup 8.8 mg  5 mL Oral QPM    insulin lispro (HUMALOG) injection   SubCUTAneous Q6H    lactated ringers bolus infusion 1,000 mL  1,000 mL IntraVENous ONCE       Allergies/Social/Family History: Allergies   Allergen Reactions    Alprazolam Rash      Social History     Tobacco Use    Smoking status: Never Smoker    Smokeless tobacco: Never Used   Substance Use Topics    Alcohol use: Not Currently     Frequency: Never      Family History   Problem Relation Age of Onset    Dementia Mother     Heart Disease Mother     Cancer Father     Stroke Brother     Stroke Other        Review of Systems:     Review of systems not obtained due to patient factors. Objective:   Vital Signs:  Visit Vitals  BP (!) 136/92   Pulse 98   Temp 99.1 °F (37.3 °C)   Resp 22   Ht 5' 7\" (1.702 m)   Wt 70.3 kg (155 lb)   SpO2 99%   BMI 24.28 kg/m²    O2 Flow Rate (L/min): 40 l/min O2 Device: Hi flow nasal cannula Temp (24hrs), Av.2 °F (36.8 °C), Min:96.3 °F (35.7 °C), Max:100.4 °F (38 °C)           Intake/Output:     Intake/Output Summary (Last 24 hours) at 12/3/2020 1827  Last data filed at 12/3/2020 1800  Gross per 24 hour   Intake 6694.75 ml   Output 1455 ml   Net 5239.75 ml       Physical Exam:  General:   Confused, cooperative, no distress, appears older than stated age. Eyes:   Conjunctivae/corneas clear. PERRL, EOMs intact. Ears:   Normal external ear canals bilaterally. Nose:   No drainage or sinus tenderness. Mouth/Throat:  Dry, sticky mucous membranes. Dentition normal.    Neck:  Symmetrical, trachea midline, no JVD or carotid bruit. Back:    No CVA tenderness to percussion.    Lungs:    Clear to auscultation bilaterally. Heart:   Regular rate and rhythm. S1, S2 normal, without murmur, click, rub or gallop. Abdomen:    Normoactive bowel sounds. Soft, non-tender, no masses or organomegaly. Extremities:  Atraumatic, no cyanosis or edema. Vascular:  Pulses 1+ and symmetric UE/LE bilat   Skin:  Pale color, non-diaphoretic, prolonged capillary refill (greater than 4 seconds). No rashes or lesions. Lymph nodes:  No Lymphadenopathy. Neurologic:  No nystagmus but his pursuit is saccadic. Masked facies, hypophonic symmetric with symmetric facial sensation. Tongue and palate are midline. Shoulder shrug symmetrical. Minimal rest tremor at the left hand. Cogwheeling at the wrist bilaterally. He is quite bradykinetic. He resists fully in the upper and lower extremities in all muscle groups to direct testing. Reflexes are symmetrical upper and lower extremities and there are no pathologic reflexes elicited. Sensory intact. LABS AND  DATA: Personally reviewed  Recent Labs     12/03/20 0134   WBC 12.7*   HGB 13.8   HCT 42.7        Recent Labs     12/03/20 0134   *   K 3.1*   *   CO2 18*   BUN 60*   CREA 2.93*   *   CA 7.8*   MG 2.8*   PHOS 3.7     Recent Labs     12/03/20 0134   AP 86   TP 6.6   ALB 3.2*  3.0*   GLOB 3.6     No results for input(s): INR, PTP, APTT, INREXT in the last 72 hours. No results for input(s): PHI, PCO2I, PO2I, FIO2I in the last 72 hours. Recent Labs     12/03/20 0134   TROIQ <0.05  <0.05       MEDS: Reviewed    RADIOLOGY:  Xr Abd (kub)    Result Date: 12/3/2020  IMPRESSION:  Nasogastric tube within the gastric lumen      Ct Head Wo Cont    Result Date: 12/3/2020  IMPRESSION: No acute findings. Ct Chest Wo Cont    Result Date: 12/3/2020  IMPRESSION: Left lower lobe consolidation and volume loss. Obstructed left lower lobe bronchus. Findings are concerning for mucous plug. Xr Chest Port    Result Date: 12/3/2020  IMPRESSION: 1.  Appropriate central line placement. 2. Left lower lobe airspace disease. Xr Chest Port    Result Date: 12/3/2020  IMPRESSION: No acute findings. Assessment:     Hospital Problems  Date Reviewed: 8/27/2020          Codes Class Noted POA    * (Principal) Septic shock ([facility] [address] ICD-10-CM: A41.9, R65.21  ICD-9-CM: 038.9, 785.52, 995.92  12/3/2020 Unknown                  Multidisciplinary Rounds Completed: Yes    ABCDEF Bundle/Checklist  Pain Medications: None  Target RASS: 0 - Alert & Calm - Spontaneously pays attention to caregiver  Sedation Medications: None  CAM-ICU:  Negative  Discussed Plan of Care (goals of care): Yes  Addressed Code Status: Do Not Resuscitate    CARDIOVASCULAR  Cardiac Gtts: Norepinephrine and Vasopressin  SBP Goal of: > 90 mmHg  MAP Goal of: > 65 mmHg  Transfusion Trigger (Hgb): <7 g/dL    RESPIRATORY  Vent Goals:   Head of bed > 30 degrees  Aggressive bronchopulmonary hygiene  DVT Prophylaxis (if no, list reason): SCD's or Sequential Compression Device   SPO2 Goal: > 92%  Pulmonary toilet: Incentive Spirometry     GI/  Cortez Catheter Present: Yes    ANTIBIOTICS  Antibiotics:  Vancomycin  Zosyn    T/L/D  Tubes: None  Lines: Peripheral IV and Central Line  Drains: Cortez Catheter    SPECIAL EQUIPMENT  None    DISPOSITION  Stay in ICU    CRITICAL CARE CONSULTANT NOTE  I provided a face-to-face bedside physician/patient encounter, greater than the usual and customary amount normally needed, due to the high complexity of medical decision-making required. I reviewed and interpreted patient data including clinical events, labs, images, vital signs, I/O's, and examined patient. I have actively participated in multi-disciplinary discussions (Emergency Medicine, Infectious Disease, Radiology, Clinical Pharm. D., Registered Dietitian, Ethicist, MICU Nursing Staff) regarding the case in formulating an optimal therapeutic plan, and effecting a management strategy for this patient.     NOTE OF PERSONAL INVOLVEMENT IN CARE   This patient has a high probability of imminent, clinically significant deterioration, which requires the highest level of preparedness to intervene urgently. I participated in the decision-making and personally managed, or directed the management of, a myriad of life and organ supporting interventions which required my frequent-interval clinical reassessments, in order to treat or prevent imminent deterioration. I personally spent 135 minutes of critical care time. This is time spent at this critically ill patient's bedside actively involved in patient care as well as the coordination of care and discussions with the patient's family. This does not include any procedural time which has been billed separately.     Joelle Hooper MD, FACS  Staff [facility] Critical Care  12/3/2020

## 2020-12-03 NOTE — PROGRESS NOTES
12/03/20 0645   Oxygen Therapy   O2 Sat (%) 100 %   Pulse via Oximetry 80 beats per minute   O2 Device Hi flow nasal cannula   O2 Flow Rate (L/min) 60 l/min   FIO2 (%) 100 %     Pt received from ED on NRB. Pt placed on HFNC 60L @ 100%.

## 2020-12-03 NOTE — ED NOTES
Client nonverbal, response to painfull stimuli. NRB at 13 with Sat of 85%. Client warm to touch. Provder to bedside, and to attempt contact with spouse or other family members.

## 2020-12-03 NOTE — PROCEDURES
[facility]      Procedure Note - Central Venous Access:   Performed by Marcela Owens NP    Obtained emergent Consent. Immediately prior to the procedure, the patient was reevaluated and found suitable for the planned procedure and any planned medications. Immediately prior to the procedure a time out was called to verify the correct patient, procedure, equipment, staff, and marking as appropriate. The site was prepped with ChloraPrep. Using Seldinger technique a Triple Lumen CVC was placed in the Right, Internal Jugular Vein via direct cannulation with 1 number of attempts for Monitoring, Blood Drawing and IV Access. Ultrasound Guidance was utilized. There was good blood return. The following complications were encountered: None. A follow-up chest x-ray was ordered post procedure. The procedure was tolerated well.

## 2020-12-03 NOTE — ED PROVIDER NOTES
HPI     43-year-old male with a history of Lewy body dementia presents the emergency department from [facility] with fever, unresponsiveness, Covid positive. Due to the patient's wife he tested positive on November 18. According to the nurse caring for him at [facility] he developed a fever around 11:00 this morning and was given Tylenol. When the current nurse arrived at 1030 she noted 104.8 fever and he was given another 650 mg of Tylenol at 10. According to the nurse he has been declining over the past few days and is essentially bedridden and had very little oral intake. I have spoken with the wife and she states that the patient does not want any mechanical ventilation, or anything artificial to keep him alive. He does not have a DNR per se. She thinks he would want CPR and medication to support his blood pressure if necessary.     Past Medical History:   Diagnosis Date    Asthma     Dementia ([facility] [address]     lewy body dementia    Musculoskeletal disorder     Stool color black        Past Surgical History:   Procedure Laterality Date    HX OTHER SURGICAL  2012    knee    HX OTHER SURGICAL  2015    neck         Family History:   Problem Relation Age of Onset    Dementia Mother     Heart Disease Mother     Cancer Father     Stroke Brother     Stroke Other        Social History     Socioeconomic History    Marital status:      Spouse name: Not on file    Number of children: Not on file    Years of education: Not on file    Highest education level: Not on file   Occupational History    Not on file   Social Needs    Financial resource strain: Not on file    Food insecurity     Worry: Not on file     Inability: Not on file    Transportation needs     Medical: Not on file     Non-medical: Not on file   Tobacco Use    Smoking status: Never Smoker    Smokeless tobacco: Never Used   Substance and Sexual Activity    Alcohol use: Not Currently     Frequency: Never    Drug use: Never    Sexual activity: Not on file   Lifestyle    Physical activity     Days per week: Not on file     Minutes per session: Not on file    Stress: Not on file   Relationships    Social connections     Talks on phone: Not on file     Gets together: Not on file     Attends Hinduism service: Not on file     Active member of club or organization: Not on file     Attends meetings of clubs or organizations: Not on file     Relationship status: Not on file    Intimate partner violence     Fear of current or ex partner: Not on file     Emotionally abused: Not on file     Physically abused: Not on file     Forced sexual activity: Not on file   Other Topics Concern    Not on file   Social History Narrative    Not on file         ALLERGIES: Alprazolam    Review of Systems   Unable to perform ROS: Dementia   Constitutional: Positive for fever. Vitals:    12/03/20 0124 12/03/20 0130   BP: (!) 82/64 (!) 84/58   Pulse: (!) 116 (!) 111   Resp: (!) 36 (!) 38   SpO2: (!) 85% (!) 85%   Weight: 70.3 kg (155 lb)    Height: 5' 7\" (1.702 m)             Physical Exam  Constitutional:       General: He is in acute distress. Appearance: He is well-developed. He is ill-appearing. HENT:      Head: Normocephalic and atraumatic. Mouth/Throat:      Pharynx: No oropharyngeal exudate. Eyes:      General: No scleral icterus. Right eye: No discharge. Left eye: No discharge. Pupils: Pupils are equal, round, and reactive to light. Neck:      Musculoskeletal: Normal range of motion and neck supple. Vascular: No JVD. Cardiovascular:      Rate and Rhythm: Regular rhythm. Tachycardia present. Heart sounds: Normal heart sounds. No murmur. Pulmonary:      Effort: Tachypnea, accessory muscle usage and respiratory distress present. Breath sounds: No stridor. Abdominal:      General: Bowel sounds are normal. There is no distension. Palpations: Abdomen is soft. There is no mass. Tenderness: There is no abdominal tenderness. There is no guarding or rebound. Musculoskeletal: Normal range of motion. Skin:     General: Skin is warm and dry. Capillary Refill: Capillary refill takes less than 2 seconds. Findings: No rash. Neurological:      Comments: Unresponsive to verbal stimuli  Resists me opening his eyes          MDM  Number of Diagnoses or Management Options  SUMEET (acute kidney injury) ([facility] [address]):   COVID-19:   Hypoxia:   45 minutes of critical care time       Procedures    ED EKG interpretation:  Rhythm: sinus tachycardia; and regular . Rate (approx.): 101; Axis: normal; P wave: normal; QRS interval: normal ; ST/T wave: non-specific changes; Diffuse ST depression, no old EKG to compare. This EKG was interpreted by Jaciel Cote MD,ED Provider. WBC mildly elevated at 12.7, SUMEET/dehydration. Spoke with wife again with update. Continue with current plan- would want IV pressors and CPR, no Vent. Continue IV fluids as he is very dry. Consider peripheral pressor support as I think the hypotension is related to dehydration. Spoke with ICU who will come see.

## 2020-12-03 NOTE — ED NOTES
Client Cortez with temp prob established. Tolerated well. Breathing labored, using acessory muscles.

## 2020-12-03 NOTE — PROGRESS NOTES
0730: Bedside and Verbal shift change report given to Eileen Rizzo RN (oncoming nurse) by Zhane Campo RN (offgoing nurse). Report included the following information SBAR, Kardex, Intake/Output, MAR, Accordion, Recent Results, Med Rec Status, Cardiac Rhythm NSR, Alarm Parameters  and Dual Neuro Assessment. 0800: Assumed care of patient; assessment documented via flowsheet; pt resting in bed; opens eyes to stimulus; withdraws x 4; afebrile; Levo started at 4mcgs (see MAR)    1000: NG tube place to R nare; pt tolerated well; KUB ordered for placement verification    1100: ICU multidisciplinary rounds lead by Dr. Makenna Elizabeth (Intensivist): The following were reviewed and discussed: current labs,  recent imaging, lines/drains, review of body systems, nutrition, cultures, mobility, length of ICU stay. The plan of care for the day is as follows  Continue medical management; NPO do to risk for aspiration; adjust insulin scale; MD to speak with pts wife regarding goals of treatment. (written note by RN)     1400: Dr. Makenna Elizabeth spoke with pts wife Félix Guillen regarding goals of treatment; continue to medically treat but pt is otherwise DNR/DNI; RN also spoke with pts wife to confirm; per pts wife pts tashia is [facility] on [address] and the number is [phone]; RN also received contact info for patient's sister Maritza Chapa who wishes to be contacted if Félix Guillen (wife) is unavailable, her contact information is [phone].    1430: Pts urine output noted to have decreased to approx 25ml/hr; boyle irrigated with 10ml normal saline with no obstruction noted; will notify MD    1800: Notified MD of low urine output; 2L LR boluses ordered; per MD remove NG tube per wife's request    1900:Bedside and Verbal shift change report given to Elizabeth Isaacs RN (oncoming nurse) by Eileen Rizzo RN (offgoing nurse).  Report included the following information SBAR, Kardex, Intake/Output, MAR, Accordion, Recent Results, Med Rec Status, Cardiac Rhythm Sinus Tach and Alarm Parameters .

## 2020-12-03 NOTE — ED NOTES
Noted improvement to client condition. BP WNL and client no longer febrile. Central line to. Mayo [place] estalished by provider. NAD. Tolerated well.

## 2020-12-03 NOTE — ACP (ADVANCE CARE PLANNING)
Advance Care Planning     Advance Care Planning Activator (Inpatient)  Conversation Note      Date of ACP Conversation: 12/03/20     Conversation Conducted with: wife Arielle Griffiths   ACP Activator: Alli Manning RN    Health Care Decision Maker:    Current Designated Health Care Decision Maker:   Primary Decision Maker: Pierre Quintanilla - Spouse - [phone]  If no Decision Maker listed above or available through scanned documents, then:    If no Authorized Decision Maker has previously been identified, then patient chooses [place]:  \"Who would you like to name as your primary health care decision-maker? \"    Name:Vika Talamantes Relationship:wife Phone number: [phone]  Koleen Ports this person be reached easily? \" Yes      Care Preferences    Ventilation: \"If you were in your present state of health and suddenly became very ill and were unable to breathe on your own, what would your preference be about the use of a ventilator (breathing machine) if it were available to you? \"  No    \"If your health worsens and it becomes clear that your chance of recovery is unlikely, what would your preference be about the use of a ventilator (breathing machine) if it were available to you? \" No        Resuscitation  \"CPR works best to restart the heart when there is a sudden event, like a heart attack, in someone who is otherwise healthy. Unfortunately, CPR does not typically restart the heart for people who have serious health conditions or who are very sick. \"    \"In the event your heart stopped as a result of an underlying serious health condition, would you want attempts to be made to restart your heart. Yes    [] Yes  [x] No   Educated Patient / Emile Correa regarding differences between Advance Directives and portable DNR orders.     Length of ACP Conversation in minutes:  10    Conversation Outcomes:  [x] ACP discussion completed  [] Existing advance directive reviewed with patient; no changes to patient's previously recorded wishes     [] New Advance Directive completed   [] Portable Do Not Resuscitate prepared for Provider review and signature  [] POLST/POST/MOLST/MOST prepared for Provider review and signature      Follow-up plan:    [] Schedule follow-up conversation to continue planning  [] Referred individual to Provider for additional questions/concerns   [] Advised patient/agent/surrogate to review completed ACP document and update if needed with changes in condition, patient preferences or care setting     [] This note routed to one or more involved healthcare providers

## 2020-12-04 PROBLEM — F41.1 ANXIETY, GENERALIZED: Chronic | Status: ACTIVE | Noted: 2019-01-01

## 2020-12-04 PROBLEM — F02.80 PARKINSON'S DISEASE, LEWY BODY (HCC): Status: ACTIVE | Noted: 2020-01-01

## 2020-12-04 PROBLEM — F02.80 PARKINSON'S DISEASE DEMENTIA (HCC): Chronic | Status: ACTIVE | Noted: 2020-01-01

## 2020-12-04 PROBLEM — U07.1 COVID-19 VIRUS INFECTION: Status: ACTIVE | Noted: 2020-01-01

## 2020-12-04 PROBLEM — B96.89 ACUTE BACTERIAL BRONCHITIS: Status: ACTIVE | Noted: 2020-01-01

## 2020-12-04 PROBLEM — F02.80 LEWY BODY DEMENTIA (HCC): Chronic | Status: ACTIVE | Noted: 2020-01-01

## 2020-12-04 PROBLEM — R57.1 HYPOVOLEMIC SHOCK (HCC): Status: ACTIVE | Noted: 2020-01-01

## 2020-12-04 PROBLEM — G31.83 PARKINSON'S DISEASE, LEWY BODY (HCC): Status: ACTIVE | Noted: 2020-01-01

## 2020-12-04 PROBLEM — R54 FRAIL ELDERLY: Chronic | Status: ACTIVE | Noted: 2020-01-01

## 2020-12-04 PROBLEM — G20 PARKINSON'S DISEASE DEMENTIA (HCC): Chronic | Status: ACTIVE | Noted: 2020-01-01

## 2020-12-04 PROBLEM — J20.9 ACUTE INFECTIVE TRACHEOBRONCHITIS: Status: ACTIVE | Noted: 2020-01-01

## 2020-12-04 PROBLEM — J20.8 ACUTE BACTERIAL BRONCHITIS: Status: ACTIVE | Noted: 2020-01-01

## 2020-12-04 PROBLEM — G31.83 LEWY BODY DEMENTIA (HCC): Chronic | Status: ACTIVE | Noted: 2020-01-01

## 2020-12-04 NOTE — PROGRESS NOTES
Problem: Risk for Spread of Infection  Goal: Prevent transmission of infectious organism to others  Description: Prevent the transmission of infectious organisms to other patients, staff members, and visitors. Outcome: Progressing Towards Goal     Problem: Falls - Risk of  Goal: *Absence of Falls  Description: Document Godfrey Abad Fall Risk and appropriate interventions in the flowsheet. Outcome: Progressing Towards Goal  Note: Fall Risk Interventions:  Mobility Interventions: Communicate number of staff needed for ambulation/transfer    Mentation Interventions: Adequate sleep, hydration, pain control, Evaluate medications/consider consulting pharmacy, More frequent rounding, Reorient patient, Room close to nurse's station, Update white board    Medication Interventions: Evaluate medications/consider consulting pharmacy    Elimination Interventions: Toileting schedule/hourly rounds    History of Falls Interventions: Evaluate medications/consider consulting pharmacy, Room close to nurse's station         Problem: Pressure Injury - Risk of  Goal: *Prevention of pressure injury  Description: Document Sunny Scale and appropriate interventions in the flowsheet. Outcome: Progressing Towards Goal  Note: Pressure Injury Interventions:  Sensory Interventions: Assess changes in LOC, Assess need for specialty bed, Avoid rigorous massage over bony prominences, Check visual cues for pain, Float heels, Keep linens dry and wrinkle-free, Minimize linen layers, Monitor skin under medical devices, Pressure redistribution bed/mattress (bed type), Turn and reposition approx.  every two hours (pillows and wedges if needed)    Moisture Interventions: Absorbent underpads, Apply protective barrier, creams and emollients, Assess need for specialty bed, Check for incontinence Q2 hours and as needed, Internal/External urinary devices, Minimize layers    Activity Interventions: Assess need for specialty bed, Pressure redistribution bed/mattress(bed type)    Mobility Interventions: Assess need for specialty bed, Float heels, HOB 30 degrees or less, Pressure redistribution bed/mattress (bed type), Turn and reposition approx.  every two hours(pillow and wedges)    Nutrition Interventions: Discuss nutritional consult with provider    Friction and Shear Interventions: HOB 30 degrees or less, Minimize layers, Transferring/repositioning devices

## 2020-12-04 NOTE — PROGRESS NOTES
[facility] Adult  Hospitalist Group     ICU Transfer/Accept Summary     This patient is being [place] [address] ICU  DATE OF TRANSFER: 12/4/2020       PATIENT ID: Dion Dover. MRN: [id number]   YOB: 1955    PRIMARY CARE PROVIDER: Xander Navarro MD   DATE OF ADMISSION: 12/3/2020  1:18 AM    ATTENDING PHYSICIAN: Kirby Del Rosario MD  CONSULTATIONS:   None    PROCEDURES/SURGERIES:   * No surgery found *    REASON FOR ADMISSION: Septic shock [facility]     HOSPITAL PROBLEM LIST:  Patient Active Problem List   Diagnosis Code    Septic shock ([facility] A41.9, R65.21    Kidney disease, chronic, stage IV (severe, EGFR 15-29 ml/min) ([facility]) N18.4    Acute kidney injury superimposed on CKD ([address] N17.9, N18.9    Sepsis due to COVID-19 ([address] U07.1, A41.89    Dehydration with hypernatremia E87.0    Severe dehydration E86.0    Parkinson's disease, Lewy body ([address] G31.83    Hypovolemic shock ([address] R57.1    Frail elderly R54    Parkinson's disease dementia ([address] G20, F02.80    Anxiety, generalized F41.1    Gait abnormality R26.9    Memory loss R41.3    COVID-19 virus infection U07.1         Brief HPI and Hospital Course:     Patient Peyton Rader is a 74-year-old male admitted to ICU with sepsis/ hypotension/acute respiratory failure. Patient was a transfer from [facility]. Patient has past medical history for asthma, dementia, musculoskeletal disorder. Patient presented with life-threatening hypotension and altered mental status from [facility] home. Chest ct noticed Left lower lobe consolidation and volume loss. Obstructed left lower lobe  bronchus. Findings are concerning for mucous plug. covid + on admission   Off pressor 12/3. Respiratory status improved after pulmonary toilet , current on high flow      Assessment and Plan:    1.  Septic/Hypovolemic shock due to PneumoniaL -post covid 19 infection and bacterial healthcare associated left lower lobe pneumonia-      IV antibiotics (Zosyn vancomycin) started in the emergency department. Will consult Infectious Disease. IV decadron 6mg for 10 day course  remdesivir was not started due to SUMEET, may consider since renal function improved. Will consult pulmonologist      2. Hypernatremia: change ivf to 1/2ns, follow NA, may need change to d5w if NA still high     3, SUMEET due to dehydration: improved. ivf changed      4. Bronchial plugging left lower lobe with atelectasis- aggressive tracheobronchial pulmonary toilet. 5. Acute hypoxia respiratory failure: plan as above      6. Parkinson's disease with severe dementia- family members to seek comfort measures, and therefore request DNR status.            PHYSICAL EXAMINATION:  Visit Vitals  BP (!) 146/99   Pulse 88   Temp 99.7 °F (37.6 °C)   Resp 17   Ht 5' 7\" (1.702 m)   Wt 72.2 kg (159 lb 2.8 oz)   SpO2 99%   BMI 24.93 kg/m²       General:          Alert, on high flow, able to communicate, talk to wife with camera phone   HEENT:           Atraumatic, MMM            Neck:               Supple, symmetrical,  thyroid: non tender  Lungs:             no wheezing. Heart:              Regular  rhythm,  No  murmur   No edema  Abdomen:       Soft,  Bowel sounds normal  Extremities:     No cyanosis. Skin:                Not pale. Psych:             Not anxious or agitated.   Neurologic:     move etx, resting tremor     Labs:     Recent Labs     12/04/20  0514 12/03/20  0134   WBC 10.8 12.7*   HGB 10.4* 13.8   HCT 32.3* 42.7   * 198     Recent Labs     12/04/20  1023 12/04/20  0514 12/03/20  0134   * 154* 156*   K 4.0 4.0 3.1*   * 124* 126*   CO2 24 25 18*   BUN 28* 30* 60*   CREA 0.89 1.00 2.93*   * 118* 188*   CA 8.3* 8.7 7.8*   MG  --  2.2 2.8*   PHOS  --  1.8* 3.7     Recent Labs     12/04/20  0514 12/03/20  0134   ALT  --  28   AP  --  86   TBILI  --  0.7   TP  --  6.6   ALB 3.0* 3.2*  3.0*   GLOB  --  3.6     Recent Labs 12/04/20  0514   INR 1.1   PTP 11.9*      No results for input(s): FE, TIBC, PSAT, FERR in the last 72 hours. No results found for: FOL, RBCF   No results for input(s): PH, PCO2, PO2 in the last 72 hours.   Recent Labs     12/04/20  0514 12/03/20  0134   CPK 80  --    TROIQ  --  <0.05  <0.05     No results found for: CHOL, CHOLX, CHLST, CHOLV, HDL, HDLP, LDL, LDLC, DLDLP, TGLX, TRIGL, TRIGP, CHHD, CHHDX  Lab Results   Component Value Date/Time    Glucose (POC) 102 (H) 12/04/2020 05:10 PM    Glucose (POC) 118 (H) 12/04/2020 11:47 AM    Glucose (POC) 115 (H) 12/04/2020 06:40 AM    Glucose (POC) 114 (H) 12/04/2020 12:47 AM    Glucose (POC) 119 (H) 12/03/2020 06:11 PM     Lab Results   Component Value Date/Time    Color DARK YELLOW 12/03/2020 01:34 AM    Appearance CLEAR 12/03/2020 01:34 AM    Specific gravity 1.023 12/03/2020 01:34 AM    pH (UA) 6.5 12/03/2020 01:34 AM    Protein TRACE (A) 12/03/2020 01:34 AM    Glucose Negative 12/03/2020 01:34 AM    Ketone TRACE (A) 12/03/2020 01:34 AM    Bilirubin Negative 12/03/2020 01:34 AM    Urobilinogen 1.0 12/03/2020 01:34 AM    Nitrites Negative 12/03/2020 01:34 AM    Leukocyte Esterase TRACE (A) 12/03/2020 01:34 AM    Epithelial cells MODERATE (A) 12/03/2020 01:34 AM    Bacteria 1+ (A) 12/03/2020 01:34 AM    WBC 5-10 12/03/2020 01:34 AM    RBC 5-10 12/03/2020 01:34 AM         CODE STATUS:   Full Code   x DNR    Partial    Comfort Care       Signed:   Nory Su MD  Date of Service:  12/4/2020  5:38 PM

## 2020-12-04 NOTE — PROGRESS NOTES
SLP Contact Note    SLP evaluation complete. Recommend regular diet/thin liquids. Full note to follow.       Thank you,  BERKLEY HoodEd, [address] [facility]  Speech-Language Pathologist

## 2020-12-04 NOTE — PROGRESS NOTES
1930: Bedside and Verbal shift change report given to Renetta Hennessy (oncoming nurse) by Desiree Morales (offgoing nurse). Report included the following information SBAR, Kardex, ED Summary, Procedure Summary, MAR, Recent Results and Cardiac Rhythm NSR/ST. 2000: Shift assessment completed per flowsheet. Patient eyes open spontaneously. No command following, upper extremities move weakly and spontaneously, lower extremities weakly withdrawn. Patient unable to answer orientation questions. PERRLA, sluggish and 4.    2100:  at bedside to preform last rights, per patient's wife's request.     0000: Reassessment completed per flowsheet. No changes from previous assessment. 0400: Reassessment completed per flowsheet. No changes from previous assessment. 56: Spoke with patient's wife Kris Johnson. Updated on patient's condition. Wife wishes to zoom call today around 12/1pm. Rn obtained e-mail address from patient's wife. 0730: Bedside and Verbal shift change report given to Devonte Jordan (oncoming nurse) by Renetta Hennessy (offgoing nurse). Report included the following information SBAR, Kardex, ED Summary, Procedure Summary, MAR, Recent Results and Cardiac Rhythm NSR with PAC and PVC.

## 2020-12-04 NOTE — PROGRESS NOTES
SPEECH PATHOLOGY BEDSIDE SWALLOW EVALUATION/DISCHARGE  Patient: Bruna Balbuena (67 y.o. male)  Date: 12/4/2020  Primary Diagnosis: Septic shock ([facility] [A41.9, R65.21]       Precautions:        ASSESSMENT :  Based on the objective data described below, the patient presents with functional oropharyngeal phases of swallow with no overt difficulty nor overt s/s of aspiration appreciated. Recommend diet as outlined below. Hold PO with shortness of breath or increase in RR. Skilled acute therapy provided by a speech-language pathologist is not indicated at this time. PLAN :  Recommendations:  --regular diet/thin liquids  --1:1 assistance  --meds in applesauce  --hold PO with worsening respiratory status    Discharge Recommendations: None     SUBJECTIVE:   Patient speaking however, SLP unable to fully hear 2/2 negative pressure room and pt's low volume. OBJECTIVE:     Past Medical History:   Diagnosis Date    Asthma     Dementia ([facility]     lewy body dementia    Musculoskeletal disorder     Stool color black      Past Surgical History:   Procedure Laterality Date    HX OTHER SURGICAL  2012    knee    HX OTHER SURGICAL  2015    neck     Diet prior to admission: Regular diet/thin liquids  Current Diet:  NPO   Cognitive and Communication Status:  Neurologic State: Alert  Orientation Level: Unable to verbalize  Cognition: No command following           Oral Assessment:  Oral Assessment  Labial: (all appeared [facility])  P.O. Trials:  Patient Position: upright in bed  Vocal quality prior to P.O.: No impairment  Consistency Presented: Thin liquid;Puree; Solid  How Presented: Cup/sip;Spoon;Straw;SLP-fed/presented; Successive swallows     Bolus Acceptance: No impairment  Bolus Formation/Control: No impairment     Propulsion: No impairment  Oral Residue: None  Initiation of Swallow: No impairment  Laryngeal Elevation: Functional  Aspiration Signs/Symptoms: None  Pharyngeal Phase Characteristics: No impairment, issues, or problems              Oral Phase Severity: No impairment  Pharyngeal Phase Severity : No impairment  NOMS:   The NOMS functional outcome measure was used to quantify this patient's level of swallowing impairment. Based on the NOMS, the patient was determined to be at level 7 for swallow function     NOMS Swallowing Levels:  Level 1 (CN): NPO  Level 2 (CM): NPO but takes consistency in therapy  Level 3 (CL): Takes less than 50% of nutrition p.o. and continues with nonoral feedings; and/or safe with mod cues; and/or max diet restriction  Level 4 (CK): Safe swallow but needs mod cues; and/or mod diet restriction; and/or still requires some nonoral feeding/supplements  Level 5 (CJ): Safe swallow with min diet restriction; and/or needs min cues  Level 6 (CI): Independent with p.o.; rare cues; usually self cues; may need to avoid some foods or needs extra time  Level 7 ([address] [place] [address]): Independent for all p.o.  BUTCH. (2003). National Outcomes Measurement System (NOMS): Adult Speech-Language Pathology User's Guide. Pain:  Pain Scale 1: Adult Nonverbal Pain Scale        After treatment:   Call bell within reach and Nursing notified    COMMUNICATION/EDUCATION:     The patient's plan of care including recommendations, planned interventions, and recommended diet changes were discussed with: Registered nurse.      Thank you for this referral.  JOYA Hood  Time Calculation: 20 mins

## 2020-12-04 NOTE — PROGRESS NOTES
0730 Bedside and Verbal shift change report given to ZEE HEARD RN (oncoming nurse) by JASON Millan RN (offgoing nurse). Report included the following information SBAR, Kardex, ED Summary, Intake/Output, MAR, Accordion, Recent Results, Med Rec Status, Cardiac Rhythm NSR WITH PVC/PAC'S  and Alarm Parameters . 1215 This RN calling pt's wife, [facility], to confirm Zoom time. Pt would like for this RN to call pt's father at 18, his sister at 1 and her at 0. Pt's wife also wanted to relay that if pt should pass,  is [facility] on [address] [phone].     [facility]'s wife. Advised her that pt has transfer orders.  TRANSFER - OUT REPORT:    Verbal report given to Krysten Cox RN(name) on [facility].  being transferred to [facility]) for routine progression of care       Report consisted of patients Situation, Background, Assessment and   Recommendations(SBAR). Information from the following report(s) SBAR, Kardex, ED Summary, Intake/Output, MAR, Accordion, Recent Results, Med Rec Status, Cardiac Rhythm NSR with occasional PVC/PAC's  and Alarm Parameters  was reviewed with the receiving nurse. Lines:   Quad Lumen 20 Right; Anterior Internal jugular (Active)   Central Line Being Utilized Yes 20 1600   Criteria for Appropriate Use Hemodynamically unstable, requiring monitoring lines, vasopressors, or volume resuscitation 20 1600   Site Assessment Clean, dry, & intact 20 1600   Infiltration Assessment 0 20 1600   Affected Extremity/Extremities Color distal to insertion site pink (or appropriate for race); Pulses palpable;Range of motion performed 20 1600   Date of Last Dressing Change 20 1600   Dressing Status Clean, dry, & intact 20 1600   Dressing Type Disk with Chlorhexadine gluconate (CHG); Transparent 20 1600   Action Taken Open ports on tubing capped 20 1600   Proximal Hub Color/Line Status White; Infusing 12/04/20 1600   Positive Blood Return (Medial Site) Yes 12/04/20 1600   Medial 1 Hub Color/Line Status [place]; Infusing 12/04/20 1600   Positive Blood Return (Lateral Site) Yes 12/04/20 1600   Medial 2 Hub Color/Line Status Blue;Flushed;Capped 12/04/20 1600   Positive Blood Return (Site #3) Yes 12/04/20 1600   Distal Hub Color/Line Status Brown;Flushed;Capped 12/04/20 1600   Positive Blood Return (Site #4) Yes 12/04/20 1600   Alcohol Cap Used Yes 12/04/20 1600       Peripheral IV 12/03/20 Right Forearm (Active)   Site Assessment Clean, dry, & intact 12/04/20 1600   Phlebitis Assessment 0 12/04/20 1600   Infiltration Assessment 0 12/04/20 1600   Dressing Status Clean, dry, & intact 12/04/20 1600   Dressing Type Transparent;Tape 12/04/20 1600   Hub Color/Line Status Pink;Capped 12/04/20 1600   Action Taken Open ports on tubing capped 12/04/20 1600   Alcohol Cap Used Yes 12/04/20 1600       Peripheral IV 12/03/20 Left Antecubital (Active)   Site Assessment Clean, dry, & intact 12/04/20 1600   Phlebitis Assessment 0 12/04/20 1600   Infiltration Assessment 0 12/04/20 1600   Dressing Status Clean, dry, & intact 12/04/20 1600   Dressing Type Transparent;Tape 12/04/20 1600   Hub Color/Line Status Pink;Capped 12/04/20 1600   Action Taken Open ports on tubing capped 12/04/20 1600   Alcohol Cap Used Yes 12/04/20 1600       Peripheral IV 12/03/20 Right Wrist (Active)   Site Assessment Clean, dry, & intact 12/04/20 1600   Phlebitis Assessment 0 12/04/20 1600   Infiltration Assessment 0 12/04/20 1600   Dressing Status Clean, dry, & intact 12/04/20 1600   Dressing Type Transparent;Tape 12/04/20 1600   Hub Color/Line Status Pink;Capped 12/04/20 1600   Action Taken Open ports on tubing capped 12/04/20 1600   Alcohol Cap Used Yes 12/04/20 1600        Opportunity for questions and clarification was provided.       Patient transported with:   Monitor  O2 @ 15 liters  Registered Nurse  Tech    [phone] notified that pt is transferring to [facility].     1826 Calling to let  Jj know that pt got a room downstairs. Only Jj and Kemar Mccarthy. can get information and has code AND no information for Doc Johanna.

## 2020-12-04 NOTE — PROGRESS NOTES
Attempted to obtain ABG. Pt squeezing fist and pulling away from on both arms. Unable to obtain ABG at this time.

## 2020-12-04 NOTE — PROGRESS NOTES
Spiritual Care Assessment/Progress Note  [address]      NAME: Angel Anderson. MRN: [id number]  AGE: 76 y.o. SEX: male  Sikh Affiliation: Unknown   Language: English     12/4/2020     Total Time (in minutes): 11     Spiritual Assessment begun in [address] through conversation with:         []Patient        [x] Family    [] Friend(s)        Reason for Consult: Initial/Spiritual assessment, critical care     Spiritual beliefs: (Please include comment if needed)     [x] Identifies with a ollie tradition:  Hoahaoism       [] Supported by a ollie community:            [] Claims no spiritual orientation:           [] Seeking spiritual identity:                [] Adheres to an individual form of spirituality:           [] Not able to assess:                           Identified resources for coping:      [x] Prayer                               [] Music                  [] Guided Imagery     [] Family/friends                 [] Pet visits     [] Devotional reading                         [] Unknown     [] Other                                              Interventions offered during this visit: (See comments for more details)          Family/Friend(s): Initial Assessment, End of life issues discussed, Prayer (assurance of)     Plan of Care:     [] Support spiritual and/or cultural needs    [] Support AMD and/or advance care planning process      [] Support grieving process   [] Coordinate Rites and/or Rituals    [] Coordination with community clergy   [] No spiritual needs identified at this time   [] Detailed Plan of Care below (See Comments)  [] Make referral to Music Therapy  [] Make referral to Pet Therapy     [] Make referral to Addiction services  [] Make referral to [facility]  [] Make referral to Spiritual Care Partner  [] No future visits requested        [x] Follow up upon further referrals     Comments: Called pt's wife by phone to offer support.   Talked briefly at this time as she shared that she was at an appointment. She expressed her awareness of the visit by the  yesterday, which she indicated brings her some peace.  assured her of ongoing prayers and explored any additional needs. None expressed at this time.       Spiritual Care Services remains available for support upon further referral.    Ruthie Sandoval, [address] [place] [address]

## 2020-12-04 NOTE — PROGRESS NOTES
Day #2 of Vancomycin  Indication:  HAP  Current regimen:  TBD  Abx regimen:  Vanc, Zosyn  ID Following ?: NO  Concomitant nephrotoxic drugs (requires more frequent monitoring): None  Frequency of BMP?: Daily    Recent Labs     20  1023 20  0514 20  0134   WBC  --  10.8 12.7*   CREA 0.89 1.00 2.93*   BUN 28* 30* 60*     Est CrCl: ~75-80 ml/min; UO: 2 ml/kg/hr  Temp (24hrs), Av.9 °F (37.7 °C), Min:99.1 °F (37.3 °C), Max:100.7 °F (38.2 °C)    Cultures:   12/3 Blood - GPC in 1/4 bottles    Goal trough = 15 - 20 mcg/mL    Recent trough history (date/time/level/dose/action taken):  20 @ 1023 trough 3.7 ug/mL    Plan: Change to Vancomycin 1000 mg IV q12h

## 2020-12-05 NOTE — CONSULTS
[facility]  Pulmonary, Critical Care, and Sleep Medicine  Name: Mariah Harvey. MRN: [id number]   : 1955 Hospital: [facility]    Date: 2020          IMPRESSION:   1. S/p Septic shock - off pressors/out of ICU  2. HCAP --> PNA COVID +  -- vanc/zosyn  3. Hypoxic respiratory failure- on HiFlow  4. LLL asds with likely mucous plugging-- new since initial CXR 12/3  5. HyperNa  6. Parkinsons  7. Lewy Body dementia  8. DNR      RECOMMENDATIONS:   · Continue Supportive care  · abx  · Wean oxygen-- ? If worsens would he tolerate BiPAP? · Decadron  · Nebs from prn to scheduled  · Pulm toilet as tolerated-- unable to use flutter, ? Chest PT; would hold on any consideration bronch  · Mucolytics when able to take PO  · DNR noted       Radiology  ( personally reviewed) CXR and CT reviewed   ABG No results for input(s): PHI, PO2I, PCO2I in the last 72 hours. Subjective/Interval History:   I have reviewed the flowsheet and previous days notes.     Pt unable to give history    77 yo male Lewy Body Dementia  Resident [place]  To ED 12/3 w fever and decreased LOC  Admitted shock/SUMEET    To ICU initially  DNR given fluids/ transient pressors  TX to floor   We are consulted  D/e RN stable through night  \"Brighter\"  Not interactive    ROS unobtainable    Patient Active Problem List   Diagnosis Code    Septic shock ([facility]) A41.9, R65.21    Kidney disease, chronic, stage IV (severe, EGFR 15-29 ml/min) ([facility]) N18.4    Acute kidney injury superimposed on CKD ([facility]) N17.9, N18.9    Sepsis due to COVID-19 ([facility]) U07.1, A41.89    Dehydration with hypernatremia E87.0    Severe dehydration E86.0    Parkinson's disease, Lewy body ([facility]) G31.83    Hypovolemic shock ([facility]) R57.1    Frail elderly R48    Parkinson's disease dementia ([facility]) Wes Cain, F02.80    Anxiety, generalized F41.1    Gait abnormality R26.9    Memory loss R41.3    COVID-19 virus infection U07.1    Acute infective tracheobronchitis J20.9    Acute bacterial bronchitis J20.8, B96.89     Past Medical History:   Diagnosis Date    Asthma     Dementia ([facility] [address]     lewy body dementia    Musculoskeletal disorder     Stool color black      Past Surgical History:   Procedure Laterality Date    HX OTHER SURGICAL  2012    knee    HX OTHER SURGICAL  2015    neck       Current Facility-Administered Medications:     carbidopa-levodopa ER (RYTARY) 36. mg per capsule 1 Cap, 1 Cap, Oral, 5XD, Harpal Reddy MD    famotidine (PEPCID) tablet 20 mg, 20 mg, Oral, BID, Chanell Reddy MD    ascorbic acid (vitamin C) (VITAMIN C) tablet 500 mg, 500 mg, Oral, BID, Chanell Reddy MD    cholecalciferol (VITAMIN D3) (400 Units /10 mcg) tablet 5 Tab, 2,000 Units, Oral, DAILY, Chanell Reddy MD    zinc sulfate (ZINCATE) 220 (50) mg capsule 1 Cap, 1 Cap, Oral, DAILY, Harpal Reddy MD    pramipexole (MIRAPEX) tablet 1.5 mg, 1.5 mg, Oral, QHS, Chanell Reddy MD    PARoxetine hcl (PAXIL) 10 mg/5 mL oral suspension 15 mg, 15 mg, Oral, 7am, Harpal Reddy MD    vancomycin (VANCOCIN) 1,000 mg in 0.9% sodium chloride 250 mL (VIAL-MATE), 1,000 mg, IntraVENous, Q12H, Harpal Reddy MD, 1,000 mg at 12/05/20 0100    enoxaparin (LOVENOX) injection 35 mg, 35 mg, SubCUTAneous, Q12H, Harpal Reddy MD, 35 mg at 12/05/20 0059    0.45% sodium chloride infusion, 100 mL/hr, IntraVENous, CONTINUOUS, Kamari Mills MD, Last Rate: 100 mL/hr at 12/05/20 0258, 100 mL/hr at 12/05/20 0258    dexamethasone (PF) (DECADRON) 10 mg/mL injection 6 mg, 6 mg, IntraVENous, Q24H, Miguel White NP, 6 mg at 12/05/20 0101    glucose chewable tablet 16 g, 4 Tab, Oral, PRN, Anoop Mack, NP    glucagon (GLUCAGEN) injection 1 mg, 1 mg, IntraMUSCular, PRN, Anoop Mack, NP    dextrose 10% infusion 0-250 mL, 0-250 mL, IntraVENous, PRN, Anoop Mack, NP    sodium chloride (NS) flush 5-40 mL, 5-40 mL, IntraVENous, Q8H, [facility], DWAYNE, 10 mL at 12/05/20 2626    sodium chloride (NS) flush 5-40 mL, 5-40 mL, IntraVENous, PRN, [facility], NP    acetaminophen (TYLENOL) tablet 650 mg, 650 mg, Oral, Q6H PRN **OR** acetaminophen (TYLENOL) suppository 650 mg, 650 mg, Rectal, Q6H PRN, [facility], DWAYNE, 650 mg at 12/05/20 0055    ondansetron (ZOFRAN) injection 4 mg, 4 mg, IntraVENous, Q6H PRN, [facility], DWAYNE    albuterol-ipratropium (DUO-NEB) 2.5 MG-0.5 MG/3 ML, 3 mL, Nebulization, Q4H PRN, [facility], NP    piperacillin-tazobactam (ZOSYN) 3.375 g in 0.9% sodium chloride (MBP/ADV) 100 mL MBP, 3.375 g, IntraVENous, Q8H, Miguel White NP, Last Rate: 25 mL/hr at 12/05/20 0507, 3.375 g at 12/05/20 0507    Vancomycin Pharmacy to Dose, , Other, Rx Dosing/Monitoring, [facility], DWAYNE    guaiFENesin ER (MUCINEX) tablet 600 mg, 600 mg, Oral, Q12H, Leah White NP, Stopped at 12/03/20 0900    morphine injection 2 mg, 2 mg, IntraVENous, Q4H PRN, [facility], DWAYNE    docusate (COLACE) 50 mg/5 mL oral liquid 100 mg, 100 mg, Oral, BID, Miguel White NP, Stopped at 12/03/20 0900    sennosides (SENOKOT) 8.8 mg/5 mL syrup 8.8 mg, 5 mL, Oral, QPM, [facility] DWAYNE Weston, Stopped at 12/03/20 1800    insulin lispro (HUMALOG) injection, , SubCUTAneous, Q6H, Gisele Reddy MD, Stopped at 12/03/20 1800     Social History     Socioeconomic History    Marital status:      Spouse name: Not on file    Number of children: Not on file    Years of education: Not on file    Highest education level: Not on file   Occupational History    Not on file   Social Needs    Financial resource strain: Not on file    Food insecurity     Worry: Not on file     Inability: Not on file   [employer] needs     Medical: Not on file     Non-medical: Not on file   Tobacco Use    Smoking status: Never Smoker    Smokeless tobacco: Never Used   Substance and Sexual Activity    Alcohol use: Not Currently Frequency: Never    Drug use: Never    Sexual activity: Not on file   Lifestyle    Physical activity     Days per week: Not on file     Minutes per session: Not on file    Stress: Not on file   Relationships    Social connections     Talks on phone: Not on file     Gets together: Not on file     Attends Congregation service: Not on file     Active member of club or organization: Not on file     Attends meetings of clubs or organizations: Not on file     Relationship status: Not on file    Intimate partner violence     Fear of current or ex partner: Not on file     Emotionally abused: Not on file     Physically abused: Not on file     Forced sexual activity: Not on file   Other Topics Concern    Not on file   Social History Narrative    Not on file     Family History   Problem Relation Age of Onset    Dementia Mother     Heart Disease Mother     Cancer Father     Stroke Brother     Stroke Other          Objective:       Vital Signs:    Patient Vitals for the past 24 hrs:   Temp Pulse Resp BP SpO2   12/05/20 0700 99.7 °F (37.6 °C) 82 21 (!) 148/83 98 %   12/05/20 0300 (!) 101.1 °F (38.4 °C) 96 22 (!) 136/92 99 %   12/05/20 0000 (!) 101.3 °F (38.5 °C) 83 23 (!) 145/98 98 %   12/04/20 2300  93 26 (!) 138/90 99 %   12/04/20 2250  100 27 (!) 151/93 99 %   12/04/20 1927 98.8 °F (37.1 °C) 78 24 (!) 152/88 98 %   12/04/20 1800  88 20  99 %   12/04/20 1703     99 %   12/04/20 1700  86 21 (!) 154/102 99 %   12/04/20 1600 99.4 °F (37.4 °C) 88 17 (!) 146/99 99 %   12/04/20 1530  (!) 101 20 (!) 128/95 94 %   12/04/20 1500  81 21 (!) 152/115 99 %   12/04/20 1430  76 20 (!) 154/107 99 %   12/04/20 1400  81 17 (!) 152/104 100 %   12/04/20 1330  80 18 (!) 156/103 100 %   12/04/20 1230  80 19 (!) 143/98 100 %   12/04/20 1200 99.7 °F (37.6 °C) 89 20 (!) 141/83 100 %   12/04/20 1130  97 23 (!) 145/100 100 %   12/04/20 1100  88 18 (!) 145/97 100 %   12/04/20 1030  85 19 (!) 134/98 99 %   12/04/20 1000  86 17 (!) 138/101 99 %   12/04/20 0930  84 21 (!) 131/90 99 %   12/04/20 0900  79 19 (!) 131/91 100 %         Intake/Output:   Last shift:         Last 3 shifts: No intake/output data recorded. [email or web address]    Intake/Output Summary (Last 24 hours) at 12/5/2020 1133  Last data filed at 12/5/2020 0700  Gross per 24 hour   Intake 4039 ml   Output 3600 ml   Net 439 ml     EXAM:     Elderly parkisons appearing male  NAD  Hi Flow 85%/ 40liters-- sats upper 90s  No WOB/ distress  mosit MM  No JVD  R IL CVL  Chest mostly clear ant  CV S1S2 occas ectopy  Abd soft  No edema  No rash        Data    I have personally reviewed data, flowsheets for the last 24 hours.         Labs:  Recent Labs     12/05/20 0519 12/04/20  0514 12/03/20  0134   WBC 11.4* 10.8 12.7*   HGB 10.4* 10.4* 13.8   HCT 31.7* 32.3* 42.7   * 129* 198     Recent Labs     12/05/20  0519 12/04/20  1023 12/04/20  0514 12/03/20  0134   * 154* 154* 156*   K 3.8 4.0 4.0 3.1*   * 123* 124* 126*   CO2 24 24 25 18*   GLU 99 125* 118* 188*   BUN 21* 28* 30* 60*   CREA 0.72 0.89 1.00 2.93*   CA 8.5 8.3* 8.7 7.8*   MG 2.2  --  2.2 2.8*   PHOS 3.0  --  1.8* 3.7   ALB 3.5  --  3.0* 3.2*  3.0*   ALT  --   --   --  28   INR 1.2*  --  1.1  --      Results     Procedure Component Value Units Date/Time    CULTURE, MRSA [[id number]] Collected:  12/04/20 1550    Order Status:  Completed Specimen:  Nasal from Nares Updated:  12/04/20 2003    CULTURE, RESPIRATORY/SPUTUM/BRONCH Herman Dre STAIN [[id number]] Collected:  12/04/20 1550    Order Status:  Completed Specimen:  Sputum Updated:  12/04/20 2122     Special Requests: NO SPECIAL REQUESTS        GRAM STAIN 1+ WBCS SEEN               OCCASIONAL EPITHELIAL CELLS SEEN                  1+ GRAM POSITIVE COCCI IN PAIRS            OCCASIONAL PSEUDOHYPHAE        Culture result: PENDING    CULTURE, BLOOD #1 [[id number]]     Order Status:  Sent Specimen:  Blood     CULTURE, BLOOD #2 [[id number]]     Order Status:  Sent Specimen:  Blood CULTURE, RESPIRATORY/SPUTUM/BRONCH Jericho Dire [[id number]]     Order Status:  Sent Specimen:  Sputum,ET Suction     CULTURE, BLOOD, PAIRED [[id number]]  (Abnormal) Collected:  12/03/20 0134    Order Status:  Completed Specimen:  Blood Updated:  12/05/20 0640     Special Requests: NO SPECIAL REQUESTS        Culture result:       STAPHYLOCOCCUS SPECIES, COAGULASE NEGATIVE GROWING IN 1 OF 4 BOTTLES DRAWN (SITE = L. FA)                  REMAINING BOTTLE(S) HAS/HAVE NO GROWTH SO FAR                  Doris Aguilar MD  [facility]

## 2020-12-05 NOTE — PROGRESS NOTES
Bedside shift change report given to Linda Poole RN (oncoming nurse) by Kaylee Barrera RN (offgoing nurse). Report included the following information SBAR, Kardex, Intake/Output, MAR, Recent Results, Med Rec Status, and Cardiac Rhythm NSR with PVCs . Patient Vitals for the past 12 hrs:   Temp Pulse Resp BP SpO2   12/05/20 0700 99.7 °F (37.6 °C) 82 21 (!) 148/83 98 %   12/05/20 0300 (!) 101.1 °F (38.4 °C) 96 22 (!) 136/92 99 %   12/05/20 0000 (!) 101.3 °F (38.5 °C) 83 23 (!) 145/98 98 %   12/04/20 2300  93 26 (!) 138/90 99 %   12/04/20 2250  100 27 (!) 151/93 99 %     Last 3 Recorded Weights in this Encounter    12/04/20 0400 12/04/20 1927 12/05/20 0700   Weight: 72.2 kg (159 lb 2.8 oz) 75.2 kg (165 lb 12.6 oz) 76 kg (167 lb 8.8 oz)     Weight variance noted; Likely SCD machine place on bed. Problem: Risk for Spread of Infection  Goal: Prevent transmission of infectious organism to others  Description: Prevent the transmission of infectious organisms to other patients, staff members, and visitors. Outcome: Progressing Towards Goal     Problem: Falls - Risk of  Goal: *Absence of Falls  Description: Document Devika Jacobsen Fall Risk and appropriate interventions in the flowsheet. Outcome: Progressing Towards Goal  Note: Fall Risk Interventions:  Mobility Interventions: Communicate number of staff needed for ambulation/transfer    Mentation Interventions: Adequate sleep, hydration, pain control, Bed/chair exit alarm, Evaluate medications/consider consulting pharmacy, Increase mobility, More frequent rounding, Reorient patient, Room close to nurse's station    Medication Interventions: Evaluate medications/consider consulting pharmacy    Elimination Interventions:  Toileting schedule/hourly rounds, Bed/chair exit alarm, Call light in reach    History of Falls Interventions: Evaluate medications/consider consulting pharmacy, Room close to nurse's station         Problem: Pressure Injury - Risk of  Goal: *Prevention of pressure injury  Description: Document Sunny Scale and appropriate interventions in the flowsheet.   Outcome: Progressing Towards Goal  Note: Pressure Injury Interventions:  Sensory Interventions: Minimize linen layers    Moisture Interventions: Internal/External urinary devices, Internal/External fecal devices    Activity Interventions: Assess need for specialty bed, Pressure redistribution bed/mattress(bed type), PT/OT evaluation    Mobility Interventions: HOB 30 degrees or less    Nutrition Interventions: Document food/fluid/supplement intake, Offer support with meals,snacks and hydration, Discuss nutritional consult with provider    Friction and Shear Interventions: HOB 30 degrees or less         Problem: Breathing Pattern - Ineffective  Goal: *Absence of hypoxia  Outcome: Progressing Towards Goal  Goal: *Use of effective breathing techniques  Outcome: Progressing Towards Goal

## 2020-12-05 NOTE — PROGRESS NOTES
[facility]  Hospitalist Group                                                                                          Hospitalist Progress Note  Evangelist Dudley MD  Answering service: [phone] OR [phone] from in house phone        Date of Service:  2020  NAME:  Adelfo Thao. :  1955  MRN:  [id number]      Admission Summary:   Patient Sophy Mcadams is a 76-year-old male admitted to ICU with sepsis/ hypotension/acute respiratory failure.  Kim See was a transfer from [facility]. Kim See has past medical history for asthma, dementia, musculoskeletal disorder.  Patient presented with life-threatening hypotension and altered mental status from [facility]. Chest ct noticed Left lower lobe consolidation and volume loss. Obstructed left lower lobe  bronchus    Interval history / Subjective:   Pt remains on 40LHFNC, not following commands, and not answering any questions. Awake. Assessment & Plan:     Septic shock - resolved  HCAP  COVID19  Acute hypoxic respiratory failure  - O2 support to keep sat>90%, on HFNC at 40L  - Continue decadron   - Nebs as needed  - Continue IV antibiotics - Zosyn, Vanc   - F/U blood cultures  - Per discussion with family, avoid escalation of care    + Coag neg staph blood cultures - suspect contaminant, repeat cultures    Afib with RVR - ?new   - given x 1 dig  - Will start low dose metoprolol with hold parameters     Hypernatremia - improving, continue 1/2NS, and may change to D5 if increased tomorrow    SUMEET - Cr on admit 2.9, from normal baseline - improved  - I and O, avoid nephrotoxins  - Cortez in place     H/o Parkinsons, Lewy body dementia  - Family considering comfort measures. Wife is POA  - Family meeting tomorrow  - Palliative consult on Monday if still undecided.      Code status: DNR  DVT prophylaxis: lovenox    Care Plan discussed with: Patient/Family  Anticipated Disposition: Home w/Family  Anticipated Discharge: Greater than 48 hours     No escalation of care. If patient were to decompensate overnight, call wife and she will likely transition to comfort measures at that time.      Hospital Problems  Date Reviewed: 12/4/2020          Codes Class Noted POA    Sepsis due to COVID-19 [facility]) ICD-10-CM: U07.1, A41.89  ICD-9-CM: 079.89, 995.91 Acute 12/3/2020 Yes        COVID-19 virus infection ICD-10-CM: U07.1  ICD-9-CM: 079.89 Acute 12/4/2020 Yes        Acute kidney injury superimposed on CKD [facility]) ICD-10-CM: N17.9, N18.9  ICD-9-CM: 866.00, 585.9 Acute 12/3/2020 Yes        Severe dehydration ICD-10-CM: E86.0  ICD-9-CM: 276.51 Acute 12/3/2020 Yes        Parkinson's disease, Lewy body ([facility] [address] ICD-10-CM: G31.83  ICD-9-CM: 332.0, 331.82 End Stage 12/3/2020 Yes        Hypovolemic shock ([facility] [address] ICD-10-CM: R57.1  ICD-9-CM: 785.59 Acute 12/3/2020 Yes        Frail elderly (Chronic) ICD-10-CM: R54  ICD-9-CM: 948 Chronic 12/3/2020 Yes        Parkinson's disease dementia ([facility] [address] (Chronic) ICD-10-CM: G20, F02.80  ICD-9-CM: 332.0, 294.10 End Stage 12/3/2020 Yes        Acute infective tracheobronchitis ICD-10-CM: J20.9  ICD-9-CM: 466.0 Acute 12/2/2020 Yes        Acute bacterial bronchitis ICD-10-CM: J20.8, B96.89  ICD-9-CM: 466.0, 041.9 Acute 12/2/2020 Yes        Kidney disease, chronic, stage IV (severe, EGFR 15-29 ml/min) ([facility]) (Chronic) ICD-10-CM: N18.4  ICD-9-CM: 585.4 End Stage 12/3/2020 Yes        Dehydration with hypernatremia ICD-10-CM: E87.0  ICD-9-CM: 276.0 Acute 12/3/2020 Yes        Anxiety, generalized (Chronic) ICD-10-CM: F41.1  ICD-9-CM: 300.02 Chronic 1/1/2019 Yes        Gait abnormality (Chronic) ICD-10-CM: R26.9  ICD-9-CM: 781.2 End Stage 1/1/2016 Yes        Memory loss ICD-10-CM: R41.3  ICD-9-CM: 780.93 Acute 1/1/2016 Yes        * (Principal) Septic shock ([facility] [address] ICD-10-CM: A41.9, R65.21  ICD-9-CM: 038.9, 785.52, 995.92  12/3/2020 Yes                Review of Systems:   A comprehensive review of systems was negative except for that written in the HPI. Vital Signs:    Last 24hrs VS reviewed since prior progress note. Most recent are:  Visit Vitals  BP (!) 149/95   Pulse (!) 104   Temp 100.4 °F (38 °C)   Resp 20   Ht 5' 7\" (1.702 m)   Wt 76 kg (167 lb 8.8 oz)   SpO2 97%   BMI 26.24 kg/m²         Intake/Output Summary (Last 24 hours) at 12/5/2020 1522  Last data filed at 12/5/2020 1100  Gross per 24 hour   Intake 706.67 ml   Output 3725 ml   Net -3018.33 ml        Physical Examination:     I had a face to face encounter with this patient and independently examined them on 12/5/2020 as outlined below:          Constitutional:  No distress, cachectic, not responding to verbal commands, chronically ill appearing   ENT:  Oral mucosa moist, oropharynx benign. Resp:  Course bilaterally. No wheezing/rhonchi/rales. No accessory muscle use   CV:  Regular rhythm, normal rate, no murmurs, gallops, rubs    GI:  Soft, non distended, non tender. normoactive bowel sounds, no hepatosplenomegaly     Musculoskeletal:  No edema, warm, 2+ pulses throughout    Neurologic:  Eyes open, does not respond to verbal commands, withdraws to pain.      Skin:  Good turgor, no rashes or ulcers       Data Review:    Review and/or order of clinical lab test  Review and/or order of tests in the radiology section of CPT  Review and/or order of tests in the medicine section of CPT      Labs:     Recent Labs     12/05/20 0519 12/04/20  0514   WBC 11.4* 10.8   HGB 10.4* 10.4*   HCT 31.7* 32.3*   * 129*     Recent Labs     12/05/20  0519 12/04/20  1023 12/04/20  0514 12/03/20  0134   * 154* 154* 156*   K 3.8 4.0 4.0 3.1*   * 123* 124* 126*   CO2 24 24 25 18*   BUN 21* 28* 30* 60*   CREA 0.72 0.89 1.00 2.93*   GLU 99 125* 118* 188*   CA 8.5 8.3* 8.7 7.8*   MG 2.2  --  2.2 2.8*   PHOS 3.0  --  1.8* 3.7     Recent Labs     12/05/20  0519 12/04/20  0514 12/03/20  0134   ALT  --   --  28   AP  --   --  86   TBILI  --   --  0.7   TP --   --  6.6   ALB 3.5 3.0* 3.2*  3.0*   GLOB  --   --  3.6     Recent Labs     12/05/20  0519 12/04/20  0514   INR 1.2* 1.1   PTP 12.4* 11.9*      No results for input(s): FE, TIBC, PSAT, FERR in the last 72 hours. No results found for: FOL, RBCF   No results for input(s): PH, PCO2, PO2 in the last 72 hours. Recent Labs     12/05/20  0519 12/04/20 0514 12/03/20  0134   CPK 73 80  --    TROIQ  --   --  <0.05  <0.05     No results found for: CHOL, CHOLX, CHLST, CHOLV, HDL, HDLP, LDL, LDLC, DLDLP, TGLX, TRIGL, TRIGP, CHHD, CHHDX  Lab Results   Component Value Date/Time    Glucose (POC) 99 12/05/2020 01:33 PM    Glucose (POC) 117 (H) 12/05/2020 05:06 AM    Glucose (POC) 83 12/05/2020 12:30 AM    Glucose (POC) 102 (H) 12/04/2020 08:31 PM    Glucose (POC) 102 (H) 12/04/2020 05:10 PM     Lab Results   Component Value Date/Time    Color DARK YELLOW 12/03/2020 01:34 AM    Appearance CLEAR 12/03/2020 01:34 AM    Specific gravity 1.023 12/03/2020 01:34 AM    pH (UA) 6.5 12/03/2020 01:34 AM    Protein TRACE (A) 12/03/2020 01:34 AM    Glucose Negative 12/03/2020 01:34 AM    Ketone TRACE (A) 12/03/2020 01:34 AM    Bilirubin Negative 12/03/2020 01:34 AM    Urobilinogen 1.0 12/03/2020 01:34 AM    Nitrites Negative 12/03/2020 01:34 AM    Leukocyte Esterase TRACE (A) 12/03/2020 01:34 AM    Epithelial cells MODERATE (A) 12/03/2020 01:34 AM    Bacteria 1+ (A) 12/03/2020 01:34 AM    WBC 5-10 12/03/2020 01:34 AM    RBC 5-10 12/03/2020 01:34 AM         Medications Reviewed:     Current Facility-Administered Medications   Medication Dose Route Frequency    albuterol (PROVENTIL HFA, VENTOLIN HFA, PROAIR HFA) inhaler 2 Puff  2 Puff Inhalation Q6H RT    ipratropium (ATROVENT HFA) 17 mcg inhaler  2 Puff Inhalation Q6H RT    [START ON 12/6/2020] Vancomycin Trough - Please draw level IMMEDIATELY PRIOR to the dose on 12/6 @ 0100, thanks!    Other ONCE    carbidopa-levodopa ER (RYTARY) 36. mg per capsule 1 Cap  1 Cap Oral 5XD    famotidine (PEPCID) tablet 20 mg  20 mg Oral BID    ascorbic acid (vitamin C) (VITAMIN C) tablet 500 mg  500 mg Oral BID    cholecalciferol (VITAMIN D3) (400 Units /10 mcg) tablet 5 Tab  2,000 Units Oral DAILY    zinc sulfate (ZINCATE) 220 (50) mg capsule 1 Cap  1 Cap Oral DAILY    pramipexole (MIRAPEX) tablet 1.5 mg  1.5 mg Oral QHS    PARoxetine hcl (PAXIL) 10 mg/5 mL oral suspension 15 mg  15 mg Oral 7am    vancomycin (VANCOCIN) 1,000 mg in 0.9% sodium chloride 250 mL (VIAL-MATE)  1,000 mg IntraVENous Q12H    enoxaparin (LOVENOX) injection 35 mg  35 mg SubCUTAneous Q12H    0.45% sodium chloride infusion  100 mL/hr IntraVENous CONTINUOUS    dexamethasone (PF) (DECADRON) 10 mg/mL injection 6 mg  6 mg IntraVENous Q24H    glucose chewable tablet 16 g  4 Tab Oral PRN    glucagon (GLUCAGEN) injection 1 mg  1 mg IntraMUSCular PRN    dextrose 10% infusion 0-250 mL  0-250 mL IntraVENous PRN    sodium chloride (NS) flush 5-40 mL  5-40 mL IntraVENous Q8H    sodium chloride (NS) flush 5-40 mL  5-40 mL IntraVENous PRN    acetaminophen (TYLENOL) tablet 650 mg  650 mg Oral Q6H PRN    Or    acetaminophen (TYLENOL) suppository 650 mg  650 mg Rectal Q6H PRN    ondansetron (ZOFRAN) injection 4 mg  4 mg IntraVENous Q6H PRN    piperacillin-tazobactam (ZOSYN) 3.375 g in 0.9% sodium chloride (MBP/ADV) 100 mL MBP  3.375 g IntraVENous Q8H    Vancomycin Pharmacy to Dose   Other Rx Dosing/Monitoring    guaiFENesin ER (MUCINEX) tablet 600 mg  600 mg Oral Q12H    morphine injection 2 mg  2 mg IntraVENous Q4H PRN    docusate (COLACE) 50 mg/5 mL oral liquid 100 mg  100 mg Oral BID    sennosides (SENOKOT) 8.8 mg/5 mL syrup 8.8 mg  5 mL Oral QPM    insulin lispro (HUMALOG) injection   SubCUTAneous Q6H     ______________________________________________________________________  EXPECTED LENGTH OF STAY: 4d 19h  ACTUAL LENGTH OF STAY:          2                 Shi Meyer MD

## 2020-12-05 NOTE — PROGRESS NOTES
[facility]    ICU Intensivist- Critical Care Progress Note    Name: Adelfo Bello : 1955   MRN: [id number]   Admit: 12/3/2020  1:18 AM      Diagnosis:     Principal Problem:    [place] Novel Coronavirus (nCoV) infection    Severe sepsis with acute organ dysfunction due to post viral bacterial healthcare associated left lower lobe pneumonia      Problem List:   Acute bacterial bronchitis   Dehydration with hypernatremia   Acute bacterial tracheobronchitis   Severe hypernatremic dehydration   Acute kidney injury superimposed on CKD   Kidney disease, chronic, stage IV (severe, EGFR 15-29 ml/min)   Parkinson's disease, Lewy body   Parkinson's disease dementia   Anxiety, generalized   Gait abnormality   Memory loss   Frail elderly    ICU Comprehensive Plan of Care:     Plans for this Shift:   1. Severe sepsis with acute organ dysfunction due to post viral bacterial healthcare associated left lower lobe pneumonia- nasotracheal suctioning of bronchial plugs has yielded mucopurulent, submitted for cultures. Atelectasis resolving    2. Rhonchi plugging left lower lobe with atelectasis- nearly resolved. 3. Acute kidney injury superimposed upon chronic kidney disease (stage IV) and severe hypernatremic dehydration- significantly improved. Subjective:     Progress Note:   2020   13:58 PM     Reason for ICU Admission:   Septic shock ([facility]     Overnight Events:   Swallowing evaluation normal    Past Medical History:      has a past medical history of Asthma, Dementia ([facility] [address] Musculoskeletal disorder, and Stool color black. Past Surgical History:      has a past surgical history that includes hx other surgical () and hx other surgical ().     Current Medications:     Current Facility-Administered Medications   Medication Dose Route Frequency    carbidopa-levodopa ER (RYTARY) 36. mg per capsule 1 Cap  1 Cap Oral 5XD    famotidine (PEPCID) tablet 20 mg  20 mg Oral BID    ascorbic acid (vitamin C) (VITAMIN C) tablet 500 mg  500 mg Oral BID    cholecalciferol (VITAMIN D3) (400 Units /10 mcg) tablet 5 Tab  2,000 Units Oral DAILY    zinc sulfate (ZINCATE) 220 (50) mg capsule 1 Cap  1 Cap Oral DAILY    pramipexole (MIRAPEX) tablet 1.5 mg  1.5 mg Oral QHS    PARoxetine hcl (PAXIL) 10 mg/5 mL oral suspension 15 mg  15 mg Oral 7am    vancomycin (VANCOCIN) 1,000 mg in 0.9% sodium chloride 250 mL (VIAL-MATE)  1,000 mg IntraVENous Q12H    [START ON 12/5/2020] enoxaparin (LOVENOX) injection 35 mg  35 mg SubCUTAneous Q12H    0.45% sodium chloride infusion  100 mL/hr IntraVENous CONTINUOUS    dexamethasone (PF) (DECADRON) 10 mg/mL injection 6 mg  6 mg IntraVENous Q24H    glucose chewable tablet 16 g  4 Tab Oral PRN    glucagon (GLUCAGEN) injection 1 mg  1 mg IntraMUSCular PRN    dextrose 10% infusion 0-250 mL  0-250 mL IntraVENous PRN    sodium chloride (NS) flush 5-40 mL  5-40 mL IntraVENous Q8H    sodium chloride (NS) flush 5-40 mL  5-40 mL IntraVENous PRN    acetaminophen (TYLENOL) tablet 650 mg  650 mg Oral Q6H PRN    Or    acetaminophen (TYLENOL) suppository 650 mg  650 mg Rectal Q6H PRN    ondansetron (ZOFRAN) injection 4 mg  4 mg IntraVENous Q6H PRN    albuterol-ipratropium (DUO-NEB) 2.5 MG-0.5 MG/3 ML  3 mL Nebulization Q4H PRN    piperacillin-tazobactam (ZOSYN) 3.375 g in 0.9% sodium chloride (MBP/ADV) 100 mL MBP  3.375 g IntraVENous Q8H    Vancomycin Pharmacy to Dose   Other Rx Dosing/Monitoring    guaiFENesin ER (MUCINEX) tablet 600 mg  600 mg Oral Q12H    morphine injection 2 mg  2 mg IntraVENous Q4H PRN    docusate (COLACE) 50 mg/5 mL oral liquid 100 mg  100 mg Oral BID    sennosides (SENOKOT) 8.8 mg/5 mL syrup 8.8 mg  5 mL Oral QPM    insulin lispro (HUMALOG) injection   SubCUTAneous Q6H       Allergies/Social/Family History:      Allergies   Allergen Reactions    Alprazolam Rash      Social History     Tobacco Use    Smoking status: Never Smoker    Smokeless tobacco: Never Used   Substance Use Topics    Alcohol use: Not Currently     Frequency: Never      Family History   Problem Relation Age of Onset    Dementia Mother     Heart Disease Mother     Cancer Father     Stroke Brother     Stroke Other        Review of Systems:     Review of systems not obtained due to patient factors. Objective:   Vital Signs:  Visit Vitals  BP (!) 152/88 (BP 1 Location: Right arm, BP Patient Position: At rest)   Pulse 78   Temp 98.8 °F (37.1 °C)   Resp 24   Ht 5' 7\" (1.702 m)   Wt 75.2 kg (165 lb 12.6 oz)   SpO2 98%   BMI 25.97 kg/m²    O2 Flow Rate (L/min): 40 l/min O2 Device: Hi flow nasal cannula Temp (24hrs), Av.8 °F (37.7 °C), Min:98.8 °F (37.1 °C), Max:100.7 °F (38.2 °C)           Intake/Output:     Intake/Output Summary (Last 24 hours) at 2020  Last data filed at 2020 1800  Gross per 24 hour   Intake 7040.11 ml   Output 2755 ml   Net 4285.11 ml       Physical Exam:  General:   Masked facies, cooperative, productive cough, appears stated age. Eyes:   Conjunctivae/corneas clear. PERRL, EOMs intact. Ears:   Normal external ear canals bilaterally. Nose:   No drainage or sinus tenderness. Mouth/Throat:  Dry, sticky mucous membranes. Dentition normal.    Neck:  Symmetrical, trachea midline, no JVD or carotid bruit. Back:    No CVA tenderness to percussion. Lungs:    Clear to auscultation bilaterally, improved breath sounds left base   Heart:   Regular rate and rhythm. S1, S2 normal, without murmur, click, rub or gallop. Abdomen:    Normoactive bowel sounds. Soft, non-tender, no masses or organomegaly. Extremities:  Atraumatic, no cyanosis or edema. Vascular:  Pulses 2+ and symmetric UE/LE bilat   Skin:  Normal color, non-diaphoretic, normal capillary refill (less than 2 seconds). No rashes or lesions. Lymph nodes:  No Lymphadenopathy.    Neurologic:  Cogwheel rigidity       LABS AND  DATA: Personally reviewed  Recent Labs 12/04/20  0514 12/03/20  0134   WBC 10.8 12.7*   HGB 10.4* 13.8   HCT 32.3* 42.7   * 198     Recent Labs     12/04/20  1023 12/04/20  0514 12/03/20  0134   * 154* 156*   K 4.0 4.0 3.1*   * 124* 126*   CO2 24 25 18*   BUN 28* 30* 60*   CREA 0.89 1.00 2.93*   * 118* 188*   CA 8.3* 8.7 7.8*   MG  --  2.2 2.8*   PHOS  --  1.8* 3.7     Recent Labs     12/04/20  0514 12/03/20 0134   AP  --  86   TP  --  6.6   ALB 3.0* 3.2*  3.0*   GLOB  --  3.6     Recent Labs     12/04/20 0514   INR 1.1   PTP 11.9*      No results for input(s): PHI, PCO2I, PO2I, FIO2I in the last 72 hours. Recent Labs     12/04/20 0514 12/03/20 0134   CPK 80  --    TROIQ  --  <0.05  <0.05         Ventilator Settings:  Mode Rate Tidal Volume Pressure FiO2 PEEP            85 %(weaned from 100%)       Peak airway pressure:      Minute ventilation:          MEDS: Reviewed    RADIOLOGY:  Xr Chest Port    Result Date: 12/4/2020  IMPRESSION: Continued opacity left lower lobe and increase left perihilar infiltrate since yesterday.          Assessment:     Hospital Problems  Date Reviewed: 12/4/2020          Codes Class Noted POA    Sepsis due to COVID-19 [facility]) ICD-10-CM: U07.1, A41.89  ICD-9-CM: 079.89, 995.91 Acute 12/3/2020 Yes        COVID-19 virus infection ICD-10-CM: U07.1  ICD-9-CM: 079.89 Acute 12/4/2020 Yes        Acute kidney injury superimposed on CKD [facility]) ICD-10-CM: N17.9, N18.9  ICD-9-CM: 866.00, 585.9 Acute 12/3/2020 Yes        Severe dehydration ICD-10-CM: E86.0  ICD-9-CM: 276.51 Acute 12/3/2020 Yes        Parkinson's disease, Lewy body ([facility] ICD-10-CM: G31.83  ICD-9-CM: 332.0, 331.82 End Stage 12/3/2020 Yes        Hypovolemic shock ([facility] ICD-10-CM: R57.1  ICD-9-CM: 785.59 Acute 12/3/2020 Yes        Frail elderly (Chronic) ICD-10-CM: R54  ICD-9-CM: 465 Chronic 12/3/2020 Yes        Parkinson's disease dementia ([facility] (Chronic) ICD-10-CM: G20, F02.80  ICD-9-CM: 332.0, 294.10 End Stage 12/3/2020 Yes        Kidney disease, chronic, stage IV (severe, EGFR 15-29 ml/min) (HCC) (Chronic) ICD-10-CM: N18.4  ICD-9-CM: 585.4 End Stage 12/3/2020 Yes        Dehydration with hypernatremia ICD-10-CM: E87.0  ICD-9-CM: 276.0 Acute 12/3/2020 Yes        Anxiety, generalized (Chronic) ICD-10-CM: F41.1  ICD-9-CM: 300.02 Chronic 1/1/2019 Yes        Gait abnormality (Chronic) ICD-10-CM: R26.9  ICD-9-CM: 781.2 End Stage 1/1/2016 Yes        Memory loss ICD-10-CM: R41.3  ICD-9-CM: 780.93 Acute 1/1/2016 Yes        * (Principal) Septic shock ([facility]) ICD-10-CM: A41.9, R65.21  ICD-9-CM: 038.9, 785.52, 995.92  12/3/2020 Yes            Multidisciplinary Rounds Completed: Yes     ABCDEF Bundle/Checklist  Pain Medications: None  Target RASS: 0 - Alert & Calm - Spontaneously pays attention to caregiver  Sedation Medications: None  CAM-ICU:  Negative  Discussed Plan of Care (goals of care): Yes  Addressed Code Status: Do Not Resuscitate     CARDIOVASCULAR  Cardiac Gtts: Norepinephrine and Vasopressin  SBP Goal of: > 90 mmHg  MAP Goal of: > 65 mmHg  Transfusion Trigger (Hgb): <7 g/dL     RESPIRATORY  Vent Goals:   Head of bed > 30 degrees  Aggressive bronchopulmonary hygiene  DVT Prophylaxis (if no, list reason): SCD's or Sequential Compression Device   SPO2 Goal: > 92%  Pulmonary toilet: Incentive Spirometry      GI/  Cortez Catheter Present:  Yes     ANTIBIOTICS  Antibiotics:  Vancomycin  Zosyn     T/L/D  Tubes: None  Lines: Peripheral IV and Central Line  Drains: Cortez Catheter     SPECIAL EQUIPMENT  None     DISPOSITION  Stay in ICU     CRITICAL CARE CONSULTANT NOTE  I provided a face-to-face bedside physician/patient encounter, greater than the usual and customary amount normally needed, due to the high complexity of medical decision-making required.     I reviewed and interpreted patient data including clinical events, labs, images, vital signs, I/O's, and examined patient.       I have actively participated in multi-disciplinary discussions (Emergency Medicine, Infectious Disease, Radiology, Clinical Pharm. D., Registered Dietitian, Ethicist, MICU Nursing Staff) regarding the case in formulating an optimal therapeutic plan, and effecting a management strategy for this patient.     NOTE OF PERSONAL INVOLVEMENT IN CARE   This patient has a high probability of imminent, clinically significant deterioration, which requires the highest level of preparedness to intervene urgently. I participated in the decision-making and personally managed, or directed the management of, a myriad of life and organ supporting interventions which required my frequent-interval clinical reassessments, in order to treat or prevent imminent deterioration.     I personally spent 75 minutes of critical care time. This is time spent at this critically ill patient's bedside actively involved in patient care as well as the coordination of care and discussions with the patient's family.   This does not include any procedural time which has been billed separately.     Evon Corbin MD, [facility] Critical Care  12/4/2020

## 2020-12-05 NOTE — ACP (ADVANCE CARE PLANNING)
[address] [facility] Adult  Hospitalist Group                                      Advance Care Planning Note    Name: Jaymie Barrera.   YOB: 1955  MRN: [id number]  Admission Date: 12/3/2020  1:18 AM    Date of discussion: 12/5/2020    Active Diagnoses:    Hospital Problems  Date Reviewed: 12/4/2020          Codes Class Noted POA    Sepsis due to COVID-19 [facility]) ICD-10-CM: U07.1, A41.89  ICD-9-CM: 079.89, 995.91 Acute 12/3/2020 Yes        COVID-19 virus infection ICD-10-CM: U07.1  ICD-9-CM: 079.89 Acute 12/4/2020 Yes        Acute kidney injury superimposed on CKD [facility]) ICD-10-CM: N17.9, N18.9  ICD-9-CM: 866.00, 585.9 Acute 12/3/2020 Yes        Severe dehydration ICD-10-CM: E86.0  ICD-9-CM: 276.51 Acute 12/3/2020 Yes        Parkinson's disease, Lewy body ([facility] ICD-10-CM: G31.83  ICD-9-CM: 332.0, 331.82 End Stage 12/3/2020 Yes        Hypovolemic shock ([facility] ICD-10-CM: R57.1  ICD-9-CM: 785.59 Acute 12/3/2020 Yes        Frail elderly (Chronic) ICD-10-CM: R54  ICD-9-CM: 436 Chronic 12/3/2020 Yes        Parkinson's disease dementia ([facility] [facility] (Chronic) ICD-10-CM: G20, F02.80  ICD-9-CM: 332.0, 294.10 End Stage 12/3/2020 Yes        Acute infective tracheobronchitis ICD-10-CM: J20.9  ICD-9-CM: 466.0 Acute 12/2/2020 Yes        Acute bacterial bronchitis ICD-10-CM: J20.8, B96.89  ICD-9-CM: 466.0, 041.9 Acute 12/2/2020 Yes        Kidney disease, chronic, stage IV (severe, EGFR 15-29 ml/min) (HCC) (Chronic) ICD-10-CM: N18.4  ICD-9-CM: 585.4 End Stage 12/3/2020 Yes        Dehydration with hypernatremia ICD-10-CM: E87.0  ICD-9-CM: 276.0 Acute 12/3/2020 Yes        Anxiety, generalized (Chronic) ICD-10-CM: F41.1  ICD-9-CM: 300.02 Chronic 1/1/2019 Yes        Gait abnormality (Chronic) ICD-10-CM: R26.9  ICD-9-CM: 781.2 End Stage 1/1/2016 Yes        Memory loss ICD-10-CM: R41.3  ICD-9-CM: 780.93 Acute 1/1/2016 Yes        * (Principal) Septic shock ([facility] ICD-10-CM: A41.9, R65.21  ICD-9-CM: 038.9, 785.52, 995.92 12/3/2020 Yes              These active diagnoses are of sufficient risk that focused discussion on advance care planning is indicated in order to allow the patient to thoughtfully consider personal goals of care, and if situations arise that prevent the ability to personally give input, to ensure appropriate representation of their personal desires for different levels and aggressiveness of care. Discussion:     Persons present and participating in discussion: Mabel Sesay MD, pt's wife     Topics Discussed:  Patient's medical condition and diagnosis: [ X ] yes [  ] no   Surrogate decision maker: [ X ] yes [  ] no   Patient's current physical function/cognitive function/frailty: [ X ] yes [  ] no   Code Status: [ X ] yes [  ] no   Artificial Nutrition / Dialysis / Non-Invasive Ventilation / Blood Transfusion: [ X ] yes [  ] no  Potential Resources for home (durable medical equipment, home nursing, home O2): [ X ] yes [  ] no    Overview of Discussion:   Had a long discussion with the wife over the phone. Patient tested positive for Covid a while back now, has been dealing with this for the last few months. Came in with septic shock, requiring multiple pressors, HCAP. He has now been on broad-spectrum antibiotics for over 48 hours, he did have improvement in his blood pressure requirements, and has now been off pressors for over 24 hours. Unfortunately however his oxygen requirements remain quite high, on 40 L of high flow. Discussed his advanced medical directive, how he stated that he would not want to be kept alive artificially, would not want CPR or intubation. His wife confirms that he is a DO NOT RESUSCITATE. Discussed options moving forward. Discussed the difference between comfort measures and aggressive care. She thinks that if there is no chance of recovery, her  would want us to move forward with comfort measures.   She is having a lot of pushback from other members of his family regarding that decision. At this time I told her that I am not completely sure that he will not get over this infection, with ongoing antibiotics. However, I do suspect that his mental status and quality of life will not improve following this hospitalization. At this time family agreeable to continue antibiotics for another 24 to 48 hours, and note improvement. If patient is not going to improve, they would at that time likely transition to hospice. On comfort measures. His wife, and medical power of  as confirmed by his advanced medical directive, does not want other family members to receive updates via nursing staff. She does want us to have a conference call tomorrow at 4:30 PM to discuss plans and medical options moving forward. We also discussed what we would do in the event that he were to worsen, have low blood pressures, or his respiratory status worsens. At that time she would like to be called, and at that time she would likely move towards comfort measures. She does not want his care escalated, does not want any ICU transfers I does not want pressors. Time Spent:     Total time spent face-to-face in education and discussion: 25 minutes.      Lizbeth Richardson MD  Date of Service:  12/5/2020  2:57 PM

## 2020-12-05 NOTE — PROGRESS NOTES
Pt HR in Afib RVR, ventricular rate 180-190s. RR called, MD Aguilar notified. Digoxin push ordered. Pt converted to SR\ST. Bedside and Verbal shift change report given to Suki TENA (oncoming nurse) by Eyad Rivera (offgoing nurse). Report included the following information SBAR, Kardex, Intake/Output, MAR, Accordion and Recent Results. Problem: Risk for Spread of Infection  Goal: Prevent transmission of infectious organism to others  Description: Prevent the transmission of infectious organisms to other patients, staff members, and visitors. Outcome: Progressing Towards Goal     Problem: Patient Education:  Go to Education Activity  Goal: Patient/Family Education  Outcome: Progressing Towards Goal     Problem: Falls - Risk of  Goal: *Absence of Falls  Description: [facility] and appropriate interventions in the flowsheet. Outcome: Progressing Towards Goal  Note: Fall Risk Interventions:  Mobility Interventions: Communicate number of staff needed for ambulation/transfer, OT consult for ADLs, Patient to call before getting OOB, PT Consult for mobility concerns, PT Consult for assist device competence, Strengthening exercises (ROM-active/passive), Utilize walker, cane, or other assistive device    Mentation Interventions: Adequate sleep, hydration, pain control, Evaluate medications/consider consulting pharmacy, Increase mobility, More frequent rounding, Reorient patient, Room close to nurse's station, Toileting rounds, Update white board    Medication Interventions: Evaluate medications/consider consulting pharmacy, Patient to call before getting OOB, Teach patient to arise slowly, Utilize gait belt for transfers/ambulation    Elimination Interventions:  Toileting schedule/hourly rounds, Call light in reach    History of Falls Interventions: Consult care management for discharge planning, Evaluate medications/consider consulting pharmacy, Room close to nurse's station Problem: Patient Education: Go to Patient Education Activity  Goal: Patient/Family Education  Outcome: Progressing Towards Goal     Problem: Pressure Injury - Risk of  Goal: *Prevention of pressure injury  Description: Document Sunny Scale and appropriate interventions in the flowsheet. Outcome: Progressing Towards Goal  Note: Pressure Injury Interventions:  Sensory Interventions: Assess changes in LOC, Assess need for specialty bed, Avoid rigorous massage over bony prominences, Check visual cues for pain, Discuss PT/OT consult with provider, Float heels, Keep linens dry and wrinkle-free, Maintain/enhance activity level, Minimize linen layers, Monitor skin under medical devices, Pad between skin to skin, Turn and reposition approx. every two hours (pillows and wedges if needed)    Moisture Interventions: Absorbent underpads, Apply protective barrier, creams and emollients, Assess need for specialty bed, Check for incontinence Q2 hours and as needed, Internal/External urinary devices, Limit adult briefs, Maintain skin hydration (lotion/cream), Minimize layers, Moisture barrier, Offer toileting Q_hr    Activity Interventions: Assess need for specialty bed, Increase time out of bed, Pressure redistribution bed/mattress(bed type), PT/OT evaluation    Mobility Interventions: Assess need for specialty bed, Float heels, HOB 30 degrees or less, PT/OT evaluation, Turn and reposition approx.  every two hours(pillow and wedges)    Nutrition Interventions: Document food/fluid/supplement intake, Discuss nutritional consult with provider, Offer support with meals,snacks and hydration    Friction and Shear Interventions: Apply protective barrier, creams and emollients, Foam dressings/transparent film/skin sealants, HOB 30 degrees or less, Minimize layers                Problem: Patient Education: Go to Patient Education Activity  Goal: Patient/Family Education  Outcome: Progressing Towards Goal     Problem: Breathing Pattern - Ineffective  Goal: *Absence of hypoxia  Outcome: Progressing Towards Goal  Goal: *Use of effective breathing techniques  Outcome: Progressing Towards Goal  Goal: *PALLIATIVE CARE:  Alleviation of Dyspnea  Outcome: Progressing Towards Goal     Problem: Patient Education: Go to Patient Education Activity  Goal: Patient/Family Education  Outcome: Progressing Towards Goal

## 2020-12-05 NOTE — PROGRESS NOTES
Wife Antoine Juarez called requesting an update on the patient's status. Wife expresses that the patient is very interested in water and fishing and would like to hear about those interests. RN will pass along to day shift nurse. Wife reiterates that no one is to obtain information regarding the patient's status except herself and the sister Isabel Del Toro. Patient requests that the MD calls with a update on the patient's prognosis. Will continue to monitor.

## 2020-12-05 NOTE — PROGRESS NOTES
TRANSFER - IN REPORT:    Verbal report received from [address] (name) on Dolly Rack.  being received from ICU (unit) for routine progression of care      Report consisted of patients Situation, Background, Assessment and   Recommendations(SBAR). Information from the following report(s) SBAR, Kardex, ED Summary, Recent Results and Cardiac Rhythm NSR w PVC PAC  was reviewed with the receiving nurse. Opportunity for questions and clarification was provided. Assessment completed upon patients arrival to unit and care assumed. Primary Nurse Danie Potrer RN and Amina Nobles RN performed a dual skin assessment on this patient No impairment noted  Sunny score is 10.

## 2020-12-05 NOTE — PROGRESS NOTES
Responded to RRT. COVID 19 pt was being attended to by medical staff and no family present. Advised nurse to contact [facility] for any further referrals.   Chaplain Byrd MDiv, MS, [place] [id number] PRAY ([phone])

## 2020-12-05 NOTE — PROGRESS NOTES
Day #3 of Vancomycin  Indication:  HAP  Current regimen:  TBD  Abx regimen:  Vanc, Zosyn  ID Following ?: NO  Concomitant nephrotoxic drugs (requires more frequent monitoring): None (vasopressors discontinued)  Frequency of BMP?: Daily through     Recent Labs     20  0519 20  1023 20  0514 20  0134   WBC 11.4*  --  10.8 12.7*   CREA 0.72 0.89 1.00 2.93*   BUN 21* 28* 30* 60*     Est CrCl: ~95 ml/min; UO: >1 ml/kg/hr  Temp (24hrs), Av °F (37.8 °C), Min:98.8 °F (37.1 °C), Max:101.3 °F (38.5 °C)    Cultures:   12/3 Blood - CoNS in 1/4 bottles - prelim   sputum - GPCs in pairs, pseudohyphae - prelim   MRSA nares - not present at 13 hours - prelim    Goal trough = 15 - 20 mcg/mL    Recent trough history (date/time/level/dose/action taken):  20 @ 1023 trough 3.7 ug/mL    Plan: Continue current regimen. Trough level ordered for  @ 0100 prior to the 4th dose at current regimen.     Sun Mulligan, PharmD, BCPP, [facility] Specialist, [address] [place] [address]

## 2020-12-06 NOTE — PROGRESS NOTES
Bedside shift change report given to Tory TENA (oncoming nurse) by Jerold Cooks RN (offgoing nurse). Report included the following information SBAR, Kardex, ED Summary, Intake/Output, MAR, Med Rec Status and Cardiac Rhythm NSR/ST. Patient Vitals for the past 12 hrs:   Temp Pulse Resp BP SpO2   12/06/20 0603  94  (!) 156/97    12/06/20 0315 (!) 100.7 °F (38.2 °C) 97 27 (!) 172/102 100 %   12/06/20 0213     99 %   12/06/20 0108     98 %   12/05/20 2340 99.6 °F (37.6 °C) 88 26 (!) 147/86 100 %   12/05/20 2106  95  (!) 174/95    12/05/20 2025     96 %   12/05/20 1958     100 %   12/05/20 1951 100 °F (37.8 °C) 99 25 (!) 179/103 96 %     Last 3 Recorded Weights in this Encounter    12/04/20 1927 12/05/20 0700 12/06/20 0315   Weight: 75.2 kg (165 lb 12.6 oz) 76 kg (167 lb 8.8 oz) 76.2 kg (167 lb 15.9 oz)       Problem: Risk for Spread of Infection  Goal: Prevent transmission of infectious organism to others  Description: Prevent the transmission of infectious organisms to other patients, staff members, and visitors. Outcome: Progressing Towards Goal     Problem: Falls - Risk of  Goal: *Absence of Falls  Description: Document Celia Reynoso Fall Risk and appropriate interventions in the flowsheet.   Outcome: Progressing Towards Goal  Note: Fall Risk Interventions:  Mobility Interventions: Communicate number of staff needed for ambulation/transfer, OT consult for ADLs, Patient to call before getting OOB, PT Consult for mobility concerns, PT Consult for assist device competence, Strengthening exercises (ROM-active/passive), Utilize walker, cane, or other assistive device    Mentation Interventions: Adequate sleep, hydration, pain control, Evaluate medications/consider consulting pharmacy, Increase mobility, More frequent rounding, Reorient patient, Room close to nurse's station, Toileting rounds, Update white board    Medication Interventions: Evaluate medications/consider consulting pharmacy, Patient to call before getting OOB, Teach patient to arise slowly, Utilize gait belt for transfers/ambulation    Elimination Interventions: Toileting schedule/hourly rounds, Call light in reach    History of Falls Interventions: Consult care management for discharge planning, Evaluate medications/consider consulting pharmacy, Room close to nurse's station         Problem: Pressure Injury - Risk of  Goal: *Prevention of pressure injury  Description: Document Sunny Scale and appropriate interventions in the flowsheet. Outcome: Progressing Towards Goal  Note: Pressure Injury Interventions:  Sensory Interventions: Assess changes in LOC, Assess need for specialty bed, Avoid rigorous massage over bony prominences, Check visual cues for pain, Discuss PT/OT consult with provider, Float heels, Keep linens dry and wrinkle-free, Maintain/enhance activity level, Minimize linen layers, Monitor skin under medical devices, Pad between skin to skin, Turn and reposition approx. every two hours (pillows and wedges if needed)    Moisture Interventions: Absorbent underpads, Apply protective barrier, creams and emollients, Assess need for specialty bed, Check for incontinence Q2 hours and as needed, Internal/External urinary devices, Limit adult briefs, Maintain skin hydration (lotion/cream), Minimize layers, Moisture barrier, Offer toileting Q_hr    Activity Interventions: Assess need for specialty bed, Increase time out of bed, Pressure redistribution bed/mattress(bed type), PT/OT evaluation    Mobility Interventions: Assess need for specialty bed, Float heels, HOB 30 degrees or less, PT/OT evaluation, Turn and reposition approx.  every two hours(pillow and wedges)    Nutrition Interventions: Document food/fluid/supplement intake, Discuss nutritional consult with provider, Offer support with meals,snacks and hydration    Friction and Shear Interventions: Apply protective barrier, creams and emollients, Foam dressings/transparent film/skin sealants, HOB 30 degrees or less, Minimize layers                Problem: Breathing Pattern - Ineffective  Goal: *Absence of hypoxia  Outcome: Progressing Towards Goal  Goal: *Use of effective breathing techniques  Outcome: Progressing Towards Goal

## 2020-12-06 NOTE — PROGRESS NOTES
[facility]  Hospitalist Group                                                                                          Hospitalist Progress Note  Anurag Stevens MD  Answering service: [phone] OR [phone] from in house phone        Date of Service:  2020  NAME:  Brian Sheets. :  1955  MRN:  [id number]      Admission Summary:   Patient Maximo Lauren is a 67-year-old male admitted to ICU with sepsis/ hypotension/acute respiratory failure.  Yolette Live was a transfer from [facility]. Yolette Live has past medical history for asthma, dementia, musculoskeletal disorder.  Patient presented with life-threatening hypotension and altered mental status from [facility]. Chest ct noticed Left lower lobe consolidation and volume loss. Obstructed left lower lobe  bronchus    Interval history / Subjective:   Continues to be awake, but not responding or following commands. Per nursing staff said a few words yesterday       Assessment & Plan:     Septic shock - resolved  HCAP  COVID19  Acute hypoxic respiratory failure - slowly improving  - O2 support to keep sat>90%, on HFNC at 20L  - Continue decadron   - Nebs as needed  - Continue IV antibiotics - Zosyn, Vanc   - F/U blood cultures  - Per discussion with family, avoid escalation of care, likely to move towards hospice.     + Coag neg staph blood cultures - suspect contaminant, repeat cultures    Afib with RVR - ?new   - given x 1 dig  - Will start low dose metoprolol with hold parameters     Hypernatremia - improving, continue 1/2NS, and may change to D5 if increased tomorrow    SUMEET - Cr on admit 2.9, from normal baseline - improved  - I and O, avoid nephrotoxins  - Cortez in place     H/o Parkinsons, Lewy body dementia  - Family considering comfort measures. Wife is POA  - Family meeting today  - Palliative consult on Monday if still undecided.      Code status: DNR  DVT prophylaxis: lovenox    Care Plan discussed with: Patient/Family  Anticipated Disposition: Home w/Family  Anticipated Discharge: Greater than 48 hours     No escalation of care. If patient were to decompensate overnight, call wife and she will likely transition to comfort measures at that time.      Hospital Problems  Date Reviewed: 12/4/2020          Codes Class Noted POA    Sepsis due to COVID-19 [facility]) ICD-10-CM: U07.1, A41.89  ICD-9-CM: 079.89, 995.91 Acute 12/3/2020 Yes        COVID-19 virus infection ICD-10-CM: U07.1  ICD-9-CM: 079.89 Acute 12/4/2020 Yes        Acute kidney injury superimposed on CKD [facility]) ICD-10-CM: N17.9, N18.9  ICD-9-CM: 866.00, 585.9 Acute 12/3/2020 Yes        Severe dehydration ICD-10-CM: E86.0  ICD-9-CM: 276.51 Acute 12/3/2020 Yes        Parkinson's disease, Lewy body ([facility] [address] ICD-10-CM: G31.83  ICD-9-CM: 332.0, 331.82 End Stage 12/3/2020 Yes        Hypovolemic shock ([facility] [address] ICD-10-CM: R57.1  ICD-9-CM: 785.59 Acute 12/3/2020 Yes        Frail elderly (Chronic) ICD-10-CM: R54  ICD-9-CM: 835 Chronic 12/3/2020 Yes        Parkinson's disease dementia ([facility] [address] (Chronic) ICD-10-CM: G20, F02.80  ICD-9-CM: 332.0, 294.10 End Stage 12/3/2020 Yes        Acute infective tracheobronchitis ICD-10-CM: J20.9  ICD-9-CM: 466.0 Acute 12/2/2020 Yes        Acute bacterial bronchitis ICD-10-CM: J20.8, B96.89  ICD-9-CM: 466.0, 041.9 Acute 12/2/2020 Yes        Kidney disease, chronic, stage IV (severe, EGFR 15-29 ml/min) (HCC) (Chronic) ICD-10-CM: N18.4  ICD-9-CM: 585.4 End Stage 12/3/2020 Yes        Dehydration with hypernatremia ICD-10-CM: E87.0  ICD-9-CM: 276.0 Acute 12/3/2020 Yes        Anxiety, generalized (Chronic) ICD-10-CM: F41.1  ICD-9-CM: 300.02 Chronic 1/1/2019 Yes        Gait abnormality (Chronic) ICD-10-CM: R26.9  ICD-9-CM: 781.2 End Stage 1/1/2016 Yes        Memory loss ICD-10-CM: R41.3  ICD-9-CM: 780.93 Acute 1/1/2016 Yes        * (Principal) Septic shock ([facility] [address] ICD-10-CM: A41.9, R65.21  ICD-9-CM: 038.9, 785.52, 995.92  12/3/2020 Yes Review of Systems:   A comprehensive review of systems was negative except for that written in the HPI. Vital Signs:    Last 24hrs VS reviewed since prior progress note. Most recent are:  Visit Vitals  BP (!) 140/90   Pulse 75   Temp 99.7 °F (37.6 °C)   Resp 23   Ht 5' 7\" (1.702 m)   Wt 76.2 kg (167 lb 15.9 oz)   SpO2 100%   BMI 26.31 kg/m²         Intake/Output Summary (Last 24 hours) at 12/6/2020 1602  Last data filed at 12/6/2020 1249  Gross per 24 hour   Intake 1646.67 ml   Output 3200 ml   Net -1553.33 ml        Physical Examination:     I had a face to face encounter with this patient and independently examined them on 12/6/2020 as outlined below:          Constitutional:  No distress, cachectic, not responding to verbal commands, chronically ill appearing   ENT:  Oral mucosa moist, oropharynx benign. Resp:  Course bilaterally. No wheezing/rhonchi/rales. No accessory muscle use   CV:  Regular rhythm, normal rate, no murmurs, gallops, rubs    GI:  Soft, non distended, non tender. normoactive bowel sounds, no hepatosplenomegaly     Musculoskeletal:  No edema, warm, 2+ pulses throughout    Neurologic:  Eyes open, does not respond to verbal commands, withdraws to pain.      Skin:  Good turgor, no rashes or ulcers       Data Review:    Review and/or order of clinical lab test  Review and/or order of tests in the radiology section of CPT  Review and/or order of tests in the medicine section of CPT      Labs:     Recent Labs     12/06/20  0106 12/05/20 0519   WBC 12.4* 11.4*   HGB 11.2* 10.4*   HCT 33.8* 31.7*   * 133*     Recent Labs     12/06/20  0106 12/05/20  0519 12/04/20  1023 12/04/20  0514   * 151* 154* 154*   K 3.5 3.8 4.0 4.0   * 119* 123* 124*   CO2 25 24 24 25   BUN 20 21* 28* 30*   CREA 0.76 0.72 0.89 1.00   GLU 96 99 125* 118*   CA 8.4* 8.5 8.3* 8.7   MG 2.1 2.2  --  2.2   PHOS 2.6 3.0  --  1.8*     Recent Labs     12/06/20  0106 12/05/20  0519 12/04/20  0514   ALB 3. 4* 3.5 3.0*     Recent Labs     12/06/20  0106 12/05/20 0519 12/04/20 0514   INR 1.4* 1.2* 1.1   PTP 14.6* 12.4* 11.9*      No results for input(s): FE, TIBC, PSAT, FERR in the last 72 hours. No results found for: FOL, RBCF   No results for input(s): PH, PCO2, PO2 in the last 72 hours.   Recent Labs     12/06/20 0106 12/05/20 0519 12/04/20 0514   CPK 81 73 80     No results found for: CHOL, CHOLX, CHLST, CHOLV, HDL, HDLP, LDL, LDLC, DLDLP, TGLX, TRIGL, TRIGP, CHHD, CHHDX  Lab Results   Component Value Date/Time    Glucose (POC) 106 (H) 12/06/2020 12:16 PM    Glucose (POC) 99 12/06/2020 05:55 AM    Glucose (POC) 98 12/05/2020 11:38 PM    Glucose (POC) 84 12/05/2020 06:28 PM    Glucose (POC) 99 12/05/2020 01:33 PM     Lab Results   Component Value Date/Time    Color DARK YELLOW 12/03/2020 01:34 AM    Appearance CLEAR 12/03/2020 01:34 AM    Specific gravity 1.023 12/03/2020 01:34 AM    pH (UA) 6.5 12/03/2020 01:34 AM    Protein TRACE (A) 12/03/2020 01:34 AM    Glucose Negative 12/03/2020 01:34 AM    Ketone TRACE (A) 12/03/2020 01:34 AM    Bilirubin Negative 12/03/2020 01:34 AM    Urobilinogen 1.0 12/03/2020 01:34 AM    Nitrites Negative 12/03/2020 01:34 AM    Leukocyte Esterase TRACE (A) 12/03/2020 01:34 AM    Epithelial cells MODERATE (A) 12/03/2020 01:34 AM    Bacteria 1+ (A) 12/03/2020 01:34 AM    WBC 5-10 12/03/2020 01:34 AM    RBC 5-10 12/03/2020 01:34 AM         Medications Reviewed:     Current Facility-Administered Medications   Medication Dose Route Frequency    vancomycin (VANCOCIN) 1250 mg in  ml infusion  1,250 mg IntraVENous Q12H    albuterol (PROVENTIL HFA, VENTOLIN HFA, PROAIR HFA) inhaler 2 Puff  2 Puff Inhalation Q6H RT    ipratropium (ATROVENT HFA) 17 mcg inhaler  2 Puff Inhalation Q6H RT    [Held by provider] metoprolol tartrate (LOPRESSOR) tablet 12.5 mg  12.5 mg Oral Q12H    metoprolol (LOPRESSOR) injection 2.5 mg  2.5 mg IntraVENous Q6H    carbidopa-levodopa ER (RYTARY) 36. mg per capsule 1 Cap  1 Cap Oral 5XD    famotidine (PEPCID) tablet 20 mg  20 mg Oral BID    ascorbic acid (vitamin C) (VITAMIN C) tablet 500 mg  500 mg Oral BID    cholecalciferol (VITAMIN D3) (400 Units /10 mcg) tablet 5 Tab  2,000 Units Oral DAILY    zinc sulfate (ZINCATE) 220 (50) mg capsule 1 Cap  1 Cap Oral DAILY    pramipexole (MIRAPEX) tablet 1.5 mg  1.5 mg Oral QHS    PARoxetine hcl (PAXIL) 10 mg/5 mL oral suspension 15 mg  15 mg Oral 7am    enoxaparin (LOVENOX) injection 35 mg  35 mg SubCUTAneous Q12H    0.45% sodium chloride infusion  100 mL/hr IntraVENous CONTINUOUS    dexamethasone (PF) (DECADRON) 10 mg/mL injection 6 mg  6 mg IntraVENous Q24H    glucose chewable tablet 16 g  4 Tab Oral PRN    glucagon (GLUCAGEN) injection 1 mg  1 mg IntraMUSCular PRN    dextrose 10% infusion 0-250 mL  0-250 mL IntraVENous PRN    sodium chloride (NS) flush 5-40 mL  5-40 mL IntraVENous Q8H    sodium chloride (NS) flush 5-40 mL  5-40 mL IntraVENous PRN    acetaminophen (TYLENOL) tablet 650 mg  650 mg Oral Q6H PRN    Or    acetaminophen (TYLENOL) suppository 650 mg  650 mg Rectal Q6H PRN    ondansetron (ZOFRAN) injection 4 mg  4 mg IntraVENous Q6H PRN    piperacillin-tazobactam (ZOSYN) 3.375 g in 0.9% sodium chloride (MBP/ADV) 100 mL MBP  3.375 g IntraVENous Q8H    Vancomycin Pharmacy to Dose   Other Rx Dosing/Monitoring    guaiFENesin ER (MUCINEX) tablet 600 mg  600 mg Oral Q12H    morphine injection 2 mg  2 mg IntraVENous Q4H PRN    docusate (COLACE) 50 mg/5 mL oral liquid 100 mg  100 mg Oral BID    sennosides (SENOKOT) 8.8 mg/5 mL syrup 8.8 mg  5 mL Oral QPM    insulin lispro (HUMALOG) injection   SubCUTAneous Q6H     ______________________________________________________________________  EXPECTED LENGTH OF STAY: 4d 19h  ACTUAL LENGTH OF STAY:          3                 Devante Stewart MD

## 2020-12-06 NOTE — ACP (ADVANCE CARE PLANNING)
[facility]  Hospitalist Group                                      Advance Care Planning Note    Name: Annalisa Jeffrey.   YOB: 1955  MRN: [id number]  Admission Date: 12/3/2020  1:18 AM    Date of discussion: 12/6/2020    Active Diagnoses:    Hospital Problems  Date Reviewed: 12/4/2020          Codes Class Noted POA    Sepsis due to COVID-19 [facility]) ICD-10-CM: U07.1, A41.89  ICD-9-CM: 079.89, 995.91 Acute 12/3/2020 Yes        COVID-19 virus infection ICD-10-CM: U07.1  ICD-9-CM: 079.89 Acute 12/4/2020 Yes        Acute kidney injury superimposed on CKD [facility]) ICD-10-CM: N17.9, N18.9  ICD-9-CM: 866.00, 585.9 Acute 12/3/2020 Yes        Severe dehydration ICD-10-CM: E86.0  ICD-9-CM: 276.51 Acute 12/3/2020 Yes        Parkinson's disease, Lewy body ([facility] ICD-10-CM: G31.83  ICD-9-CM: 332.0, 331.82 End Stage 12/3/2020 Yes        Hypovolemic shock ([facility] [address] ICD-10-CM: R57.1  ICD-9-CM: 785.59 Acute 12/3/2020 Yes        Frail elderly (Chronic) ICD-10-CM: R54  ICD-9-CM: 142 Chronic 12/3/2020 Yes        Parkinson's disease dementia ([facility] [address] (Chronic) ICD-10-CM: G20, F02.80  ICD-9-CM: 332.0, 294.10 End Stage 12/3/2020 Yes        Acute infective tracheobronchitis ICD-10-CM: J20.9  ICD-9-CM: 466.0 Acute 12/2/2020 Yes        Acute bacterial bronchitis ICD-10-CM: J20.8, B96.89  ICD-9-CM: 466.0, 041.9 Acute 12/2/2020 Yes        Kidney disease, chronic, stage IV (severe, EGFR 15-29 ml/min) (HCC) (Chronic) ICD-10-CM: N18.4  ICD-9-CM: 585.4 End Stage 12/3/2020 Yes        Dehydration with hypernatremia ICD-10-CM: E87.0  ICD-9-CM: 276.0 Acute 12/3/2020 Yes        Anxiety, generalized (Chronic) ICD-10-CM: F41.1  ICD-9-CM: 300.02 Chronic 1/1/2019 Yes        Gait abnormality (Chronic) ICD-10-CM: R26.9  ICD-9-CM: 781.2 End Stage 1/1/2016 Yes        Memory loss ICD-10-CM: R41.3  ICD-9-CM: 780.93 Acute 1/1/2016 Yes        * (Principal) Septic shock ([facility] ICD-10-CM: A41.9, R65.21  ICD-9-CM: 038.9, 785.52, 995.92 12/3/2020 Yes              These active diagnoses are of sufficient risk that focused discussion on advance care planning is indicated in order to allow the patient to thoughtfully consider personal goals of care, and if situations arise that prevent the ability to personally give input, to ensure appropriate representation of their personal desires for different levels and aggressiveness of care. Discussion:     Persons present and participating in discussion: Bg Cueva MD, patient's wife [facility]    Topics Discussed:  Patient's medical condition and diagnosis: [ X ] yes [  ] no   Surrogate decision maker: [ X ] yes [  ] no   Patient's current physical function/cognitive function/frailty: [ X ] yes [  ] no   Code Status: [ X ] yes [  ] no   Artificial Nutrition / Dialysis / Non-Invasive Ventilation / Blood Transfusion: [ X ] yes [  ] no  Potential Resources for home (durable medical equipment, home nursing, home O2): [ X ] yes [  ] no    Overview of Discussion:     Had 2 separate conversations. Initially called patient's medical power of , [facility] his wife. She was present with her sister. She gave me permission to review patient's care plan. Reviewed his diagnosis, COVID-19 pneumonia, hospital-acquired pneumonia. His sepsis is improved, his respiratory status is improving as well. Unfortunately his mental status had not been improving when I examined him (although later told that his mental status was slightly better later in the day). We often find that patients with Covid, with underlying dementia do not tend to do well. I would not be surprised if his level of functioning is way below his baseline from prior to infection. [facility] asked me appropriate questions about whether we are prolonging the \"inevitable \". Reviewed that at this time we are treating his acute problems. And that we may be able to reverse his acute respiratory failure.   However he does still have severe dementia and severe Parkinson's which unfortunately tends to be terminal.  That being said Parkinson's often takes a long time to deteriorate. Reviewed questions about what hospice would look like, what comfort measures would look like. Namely that we would not be doing IV fluids, IV antibiotics, or any laboratory testing. We would focus on his comfort, given medications to help him breathe without discomfort, give him oxygen for comfort. Would also allow him to eat for comfort, but would not push him if he was not hungry. I later had a conference call with patient's wife, along with patient's father, sister and other family members. They are present in [place]. I gave a synopsis similar to above, about his medical problems, prognosis in the hospital, and long-term prognosis. Reviewed that it would be completely appropriate from medical standpoint to shift to comfort and hospice. This decision falls with his medical power of , his wife. Reviewed his advanced medical directive with the family. He did state that he would not want any artificial means of nutrition, including IV hydration. Reviewed that Tiffany Garcia can make the decision to shift towards comfort and hospice at any point. Family asked me about a vaccine, and whether this would help him if he could get it in the next few weeks. Reviewed that a vaccine was used to help prevent COVID-19, and is unfortunately not used as a treatment. Time Spent:     Total time spent face-to-face in education and discussion: 45 minutes.      Dg Torres MD  Date of Service:  12/6/2020  4:33 PM

## 2020-12-06 NOTE — CONSULTS
[facility] [place]  Pulmonary, Critical Care, and Sleep Medicine  Name: Carlos Mak. MRN: [id number]   : 1955 Hospital: [facility]    Date: 2020          IMPRESSION:   1. S/p Septic shock - off pressors/out of ICU  2. HCAP --> PNA COVID +  -- vanc/zosyn  3. Hypoxic respiratory failure- on HiFlow  4. LLL asds with likely mucous plugging-- new since initial CXR 12/3  5. Afib RVR  w rapid response  6. HyperNa  7. Parkinsons  8. Lewy Body dementia  9. DNR      RECOMMENDATIONS:   · Continue Supportive care  · Continue abx  · Titrate oxygen-- ? If worsens would he tolerate BiPAP/ ok w family as \"escalation of care\"  · Decadron  · Nebs are scheduled  · NT suction prn  · Pulm toilet as tolerated-- unable to use flutter, Chest PT; would hold on any consideration bronch  · Mucolytics when able to take PO  · DNR noted- additional family conference planned per notes       Radiology  ( personally reviewed) CXR and CT reviewed   ABG No results for input(s): PHI, PO2I, PCO2I in the last 72 hours.        Subjective/Interval History:       Chart reviewed  D/w RN   Rapid response w afib RVR yesterday  Little change o/w  On HiFlow 40L/92%--> sats upper 90s        ----------  STORY:  Pt unable to give history    75 yo male Lewy Body Dementia  Resident [place]  To ED 12/3 w fever and decreased LOC  Admitted shock/SUMEET    To ICU initially  DNR given fluids/ transient pressors  TX to floor   We are consulted  D/e RN stable through night  \"Brighter\"  Not interactive      Current Facility-Administered Medications:     vancomycin (VANCOCIN) 1250 mg in  ml infusion, 1,250 mg, IntraVENous, Q12H, Dahlia Sanchez MD    albuterol (PROVENTIL HFA, VENTOLIN HFA, PROAIR HFA) inhaler 2 Puff, 2 Puff, Inhalation, Q6H RT, Jack Luciano MD, 2 Puff at 20 0107    ipratropium (ATROVENT HFA) 17 mcg inhaler, 2 Puff, Inhalation, Q6H RT, Jack Luciano MD, 2 Puff at 20 0107   [Held by provider] metoprolol tartrate (LOPRESSOR) tablet 12.5 mg, 12.5 mg, Oral, Q12H, Jasmin Gregory MD    metoprolol (LOPRESSOR) injection 2.5 mg, 2.5 mg, IntraVENous, Q6H, Joe Frausto NP, 2.5 mg at 12/06/20 0603    carbidopa-levodopa ER (RYTARY) 36. mg per capsule 1 Cap, 1 Cap, Oral, 5XD, Karen Kidd MD, Stopped at 12/05/20 2200    famotidine (PEPCID) tablet 20 mg, 20 mg, Oral, BID, Alberto Reddy MD    ascorbic acid (vitamin C) (VITAMIN C) tablet 500 mg, 500 mg, Oral, BID, Alberto Reddy MD    cholecalciferol (VITAMIN D3) (400 Units /10 mcg) tablet 5 Tab, 2,000 Units, Oral, DAILY, Alberto Reddy MD    zinc sulfate (ZINCATE) 220 (50) mg capsule 1 Cap, 1 Cap, Oral, DAILY, Alberto Reddy MD    pramipexole (MIRAPEX) tablet 1.5 mg, 1.5 mg, Oral, QHS, Harpal Reddy MD, Stopped at 12/05/20 2200    PARoxetine hcl (PAXIL) 10 mg/5 mL oral suspension 15 mg, 15 mg, Oral, 7am, Harpal Reddy MD, Stopped at 12/06/20 0700    enoxaparin (LOVENOX) injection 35 mg, 35 mg, SubCUTAneous, Q12H, Harpal Reddy MD, 35 mg at 12/06/20 0115    0.45% sodium chloride infusion, 100 mL/hr, IntraVENous, CONTINUOUS, Myra Wilkins MD, Last Rate: 100 mL/hr at 12/06/20 0103, 100 mL/hr at 12/06/20 0103    dexamethasone (PF) (DECADRON) 10 mg/mL injection 6 mg, 6 mg, IntraVENous, Q24H, Miguel White NP, 6 mg at 12/06/20 0104    glucose chewable tablet 16 g, 4 Tab, Oral, PRN, Marc Shoulder, NP    glucagon (GLUCAGEN) injection 1 mg, 1 mg, IntraMUSCular, PRN, Marc Shoulder, NP    dextrose 10% infusion 0-250 mL, 0-250 mL, IntraVENous, PRN, Marc Shoulder, NP    sodium chloride (NS) flush 5-40 mL, 5-40 mL, IntraVENous, Q8H, Miguel White, NP, 10 mL at 12/06/20 0604    sodium chloride (NS) flush 5-40 mL, 5-40 mL, IntraVENous, PRN, Marc Shoulder, NP    acetaminophen (TYLENOL) tablet 650 mg, 650 mg, Oral, Q6H PRN **OR** acetaminophen (TYLENOL) suppository 650 mg, 650 mg, Rectal, Q6H PRN, Robyne Number, NP, 650 mg at 12/05/20 1819    ondansetron (ZOFRAN) injection 4 mg, 4 mg, IntraVENous, Q6H PRN, Chantal Pruitt NP    piperacillin-tazobactam (ZOSYN) 3.375 g in 0.9% sodium chloride (MBP/ADV) 100 mL MBP, 3.375 g, IntraVENous, Q8H, Miguel White NP, Last Rate: 25 mL/hr at 12/06/20 0605, 3.375 g at 12/06/20 0605    Vancomycin Pharmacy to Dose, , Other, Rx Dosing/Monitoring, Chantal Pruitt NP    guaiFENesin ER [facility]) tablet 600 mg, 600 mg, Oral, Q12H, Chantal Pruitt NP, Stopped at 12/03/20 0900    morphine injection 2 mg, 2 mg, IntraVENous, Q4H PRN, Chantal Pruitt NP    docusate (COLACE) 50 mg/5 mL oral liquid 100 mg, 100 mg, Oral, BID, Chantal Pruitt NP, Stopped at 12/03/20 0900    sennosides (SENOKOT) 8.8 mg/5 mL syrup 8.8 mg, 5 mL, Oral, QPM, Andrey White NP, Stopped at 12/03/20 1800    insulin lispro (HUMALOG) injection, , SubCUTAneous, Q6H, Harpal Reddy MD, Stopped at 12/03/20 1800         Objective:       Vital Signs:    Patient Vitals for the past 24 hrs:   Temp Pulse Resp BP SpO2   12/06/20 0742 99.3 °F (37.4 °C) 90 27 (!) 158/94 100 %   12/06/20 0603  94  (!) 156/97    12/06/20 0315 (!) 100.7 °F (38.2 °C) 97 27 (!) 172/102 100 %   12/06/20 0213     99 %   12/06/20 0108     98 %   12/05/20 2340 99.6 °F (37.6 °C) 88 26 (!) 147/86 100 %   12/05/20 2106  95  (!) 174/95    12/05/20 2025     96 %   12/05/20 1958     100 %   12/05/20 1951 100 °F (37.8 °C) 99 25 (!) 179/103 96 %   12/05/20 1830  (!) 107 25 (!) 169/102 95 %   12/05/20 1800 100.4 °F (38 °C) 99 26 (!) 153/101 97 %   12/05/20 1710  100 24 (!) 157/94 95 %   12/05/20 1700  98 25 (!) 155/105 93 %   12/05/20 1630  100 23 (!) 149/96 98 %   12/05/20 1600  (!) 101 24 (!) 151/98 97 %   12/05/20 1550  (!) 102 24 (!) 147/95 97 %   12/05/20 1530  (!) 103 27 (!) 149/97 95 %   12/05/20 1500  (!) 103 24 (!) 153/106 98 %   12/05/20 1450  (!) 104 20 (!) 149/95 97 %   12/05/20 1440  97 27 (!) 154/104 98 %   12/05/20 1438  99 27 (!) 155/107 98 %   12/05/20 1436  99 26 (!) 160/99 97 %   12/05/20 1435  98  (!) 160/99    12/05/20 1430  (!) 189 27  99 %   12/05/20 1428 100.4 °F (38 °C) (!) 181 29 94/77 98 %   12/05/20 1423  (!) 174 (!) 32 114/65 100 %   12/05/20 1423  (!) 189 29  99 %   12/05/20 1421  (!) 173 30 114/65    12/05/20 1420  (!) 197 28  99 %   12/05/20 1400  100 26 (!) 162/101 99 %   12/05/20 1105     95 %   12/05/20 1100 100.2 °F (37.9 °C) 94 17 (!) 167/102 96 %   12/05/20 1057     96 %   12/05/20 1000  78 22 (!) 157/92 97 %         Intake/Output:   Last shift:         Last 3 shifts: No intake/output data recorded. [id number]    Intake/Output Summary (Last 24 hours) at 12/6/2020 0818  Last data filed at 12/6/2020 4051  Gross per 24 hour   Intake 1646.67 ml   Output 2850 ml   Net -1203.33 ml     EXAM:     Not examined PPE conservation  D/w RN outside door  No new findings per her report        Data    I have personally reviewed data, flowsheets for the last 24 hours.         Labs:  Recent Labs     12/06/20  0106 12/05/20  0519 12/04/20  0514   WBC 12.4* 11.4* 10.8   HGB 11.2* 10.4* 10.4*   HCT 33.8* 31.7* 32.3*   * 133* 129*     Recent Labs     12/06/20  0106 12/05/20  0519 12/04/20  1023 12/04/20  0514   * 151* 154* 154*   K 3.5 3.8 4.0 4.0   * 119* 123* 124*   CO2 25 24 24 25   GLU 96 99 125* 118*   BUN 20 21* 28* 30*   CREA 0.76 0.72 0.89 1.00   CA 8.4* 8.5 8.3* 8.7   MG 2.1 2.2  --  2.2   PHOS 2.6 3.0  --  1.8*   ALB 3.4* 3.5  --  3.0*   INR 1.4* 1.2*  --  1.1     Results     Procedure Component Value Units Date/Time    CULTURE, BLOOD, PAIRED [[id number]] Collected:  12/05/20 1815    Order Status:  Completed Specimen:  Blood Updated:  12/06/20 0761     Special Requests: NO SPECIAL REQUESTS        Culture result: NO GROWTH AFTER 11 HOURS       CULTURE, MRSA [[id number]] Collected:  12/04/20 6172 Order Status:  Completed Specimen:  Nasal from Nares Updated:  12/05/20 2136     Special Requests: NO SPECIAL REQUESTS        Culture result: MRSA NOT PRESENT                   Screening of patient nares for MRSA is for surveillance purposes and, if positive, to facilitate isolation considerations in high risk settings. It is not intended for automatic decolonization interventions per se as regimens are not sufficiently effective to warrant routine use.           CULTURE, RESPIRATORY/SPUTUM/BRONCH Elgie Tarik STAIN [[id number]]  (Abnormal) Collected:  12/04/20 1550    Order Status:  Completed Specimen:  Sputum Updated:  12/05/20 1340     Special Requests: NO SPECIAL REQUESTS        GRAM STAIN 1+ WBCS SEEN               OCCASIONAL EPITHELIAL CELLS SEEN                  1+ GRAM POSITIVE COCCI IN PAIRS            OCCASIONAL PSEUDOHYPHAE        Culture result: MODERATE YEAST               NO NORMAL RESPIRATORY PACO ISOLATED , SO FAR          CULTURE, BLOOD #1 [[id number]]     Order Status:  Canceled Specimen:  Blood     CULTURE, BLOOD #2 [[id number]]     Order Status:  Canceled Specimen:  Blood     CULTURE, RESPIRATORY/SPUTUM/BRONCH Elgie Tarik STAIN [[id number]]     Order Status:  Canceled Specimen:  Sputum,ET Suction     CULTURE, BLOOD, PAIRED [[id number]]  (Abnormal) Collected:  12/03/20 0134    Order Status:  Completed Specimen:  Blood Updated:  12/05/20 0640     Special Requests: NO SPECIAL REQUESTS        Culture result:       STAPHYLOCOCCUS SPECIES, COAGULASE NEGATIVE GROWING IN 1 OF 4 BOTTLES DRAWN (SITE = L. FA)                  REMAINING BOTTLE(S) HAS/HAVE NO GROWTH SO FAR                  Fawad Padilla MD  [facility]

## 2020-12-07 NOTE — PROGRESS NOTES
Pt more alert today, tracking with his eyes, pt not oriented to self, place nor time, but stated when asked orientation questions, \"I apologize I don't know. \" Pt stated, \"I am alright,\" when I asked him if he was ok, or if he was in any pain. Pt requested something to drink, stated, \"I need to wet my whistle. \" Offered pt ensure through a straw, unable to drink, offered pt water in a cup, pt was able to drink water. SLP to be reconsulted tmrw. Pt was unable to eat any food served to him today. Weaned pt to room air. Pt bathed, linens changed - tolerated well. Urine output 750ml. Updated pt's wife. She requested to consult hospice services for her , notified the night shift RN- to pass along to day shift tmrw. Bedside and Verbal shift change report given to [facility] AND [facility] - THE [facility] RN (oncoming nurse) by Maribel Burciaga (offgoing nurse). Report included the following information SBAR, Kardex, Intake/Output, MAR, Accordion and Recent Results. Problem: Risk for Spread of Infection  Goal: Prevent transmission of infectious organism to others  Description: Prevent the transmission of infectious organisms to other patients, staff members, and visitors. Outcome: Progressing Towards Goal     Problem: Patient Education:  Go to Education Activity  Goal: Patient/Family Education  Outcome: Progressing Towards Goal     Problem: Falls - Risk of  Goal: *Absence of Falls  Description: Document Miguel Click Fall Risk and appropriate interventions in the flowsheet.   Outcome: Progressing Towards Goal  Note: Fall Risk Interventions:  Mobility Interventions: OT consult for ADLs, Patient to call before getting OOB, PT Consult for mobility concerns, PT Consult for assist device competence, Strengthening exercises (ROM-active/passive), Utilize walker, cane, or other assistive device, Utilize gait belt for transfers/ambulation    Mentation Interventions: Adequate sleep, hydration, pain control, Evaluate medications/consider consulting pharmacy, Increase mobility, More frequent rounding, Reorient patient, Room close to nurse's station, Toileting rounds, Update white board    Medication Interventions: Evaluate medications/consider consulting pharmacy, Patient to call before getting OOB, Teach patient to arise slowly    Elimination Interventions: Call light in reach, Patient to call for help with toileting needs, Toilet paper/wipes in reach, Toileting schedule/hourly rounds    History of Falls Interventions: Consult care management for discharge planning, Evaluate medications/consider consulting pharmacy, Room close to nurse's station         Problem: Patient Education: Go to Patient Education Activity  Goal: Patient/Family Education  Outcome: Progressing Towards Goal     Problem: Pressure Injury - Risk of  Goal: *Prevention of pressure injury  Description: Document Sunny Scale and appropriate interventions in the flowsheet. Outcome: Progressing Towards Goal  Note: Pressure Injury Interventions:  Sensory Interventions: Assess changes in LOC, Assess need for specialty bed, Avoid rigorous massage over bony prominences, Check visual cues for pain, Discuss PT/OT consult with provider, Float heels, Keep linens dry and wrinkle-free, Maintain/enhance activity level, Minimize linen layers, Monitor skin under medical devices, Pressure redistribution bed/mattress (bed type), Turn and reposition approx.  every two hours (pillows and wedges if needed)    Moisture Interventions: Absorbent underpads, Apply protective barrier, creams and emollients, Check for incontinence Q2 hours and as needed, Internal/External urinary devices, Limit adult briefs, Maintain skin hydration (lotion/cream), Minimize layers, Offer toileting Q_hr    Activity Interventions: Assess need for specialty bed, Increase time out of bed, PT/OT evaluation    Mobility Interventions: Assess need for specialty bed, Float heels, HOB 30 degrees or less, Pressure redistribution bed/mattress (bed type), PT/OT evaluation, Turn and reposition approx.  every two hours(pillow and wedges)    Nutrition Interventions: Document food/fluid/supplement intake, Discuss nutritional consult with provider, Offer support with meals,snacks and hydration    Friction and Shear Interventions: Apply protective barrier, creams and emollients, HOB 30 degrees or less, Minimize layers                Problem: Patient Education: Go to Patient Education Activity  Goal: Patient/Family Education  Outcome: Progressing Towards Goal

## 2020-12-07 NOTE — PROGRESS NOTES
[facility] Adult  Hospitalist Group                                                                                          Hospitalist Progress Note  Andra Salinas MD  Answering service: [phone] OR [phone] from in house phone        Date of Service:  2020  NAME:  Tito Fernández. :  1955  MRN:  [id number]      Admission Summary:   Patient Blanca Castellon is a 58-year-old male admitted to ICU with sepsis/ hypotension/acute respiratory failure.  Nazanin Fletcher was a transfer from [facility]. Nazanin Fletcher has past medical history for asthma, dementia, musculoskeletal disorder.  Patient presented with life-threatening hypotension and altered mental status from [facility]. Chest ct noticed Left lower lobe consolidation and volume loss. Obstructed left lower lobe  bronchus    Interval history / Subjective:   Nursing staff stated that his wife would like to transition to hospice. He is alert, but not following commands. Off O2    Discussed with his wife, and she would like to transition to comfort measures. Abx discontinued. No further lab draws. Assessment & Plan:     Septic shock - resolved  HCAP  COVID19  Acute hypoxic respiratory failure - slowly improving  + Coag neg staph blood cultures - suspect contaminant  Afib with RVR -   Hypernatremia -   SUMEET - Cr on admit 2.9, from normal baseline - improved  H/o Parkinsons, Lewy body dementia  - Continue levodopa-carbidopa  - Add comfort meds: ativan, morphine  - DC all abx, IVF, and blood draws per family wishes.   - Hospice consulted, palliative consulted.        Code status: DNR  DVT prophylaxis: lovenox    Care Plan discussed with: Patient/Family  Anticipated Disposition: Home w/Family  Anticipated Discharge: Greater than 48 hours Hospice IP vs. SNF     Hospital Problems  Date Reviewed: 2020          Codes Class Noted POA    Sepsis due to COVID-19 [facility]) ICD-10-CM: U07.1, A41.89  ICD-9-CM: 079.89, 995.91 Acute 12/3/2020 Yes        COVID-19 virus infection ICD-10-CM: U07.1  ICD-9-CM: 079.89 Acute 12/4/2020 Yes        Acute kidney injury superimposed on CKD [facility]) ICD-10-CM: N17.9, N18.9  ICD-9-CM: 866.00, 585.9 Acute 12/3/2020 Yes        Severe dehydration ICD-10-CM: E86.0  ICD-9-CM: 276.51 Acute 12/3/2020 Yes        Parkinson's disease, Lewy body ([facility] ICD-10-CM: G31.83  ICD-9-CM: 332.0, 331.82 End Stage 12/3/2020 Yes        Hypovolemic shock ([facility] ICD-10-CM: R57.1  ICD-9-CM: 785.59 Acute 12/3/2020 Yes        Frail elderly (Chronic) ICD-10-CM: R54  ICD-9-CM: 143 Chronic 12/3/2020 Yes        Parkinson's disease dementia ([facility] [address] (Chronic) ICD-10-CM: G20, F02.80  ICD-9-CM: 332.0, 294.10 End Stage 12/3/2020 Yes        Acute infective tracheobronchitis ICD-10-CM: J20.9  ICD-9-CM: 466.0 Acute 12/2/2020 Yes        Acute bacterial bronchitis ICD-10-CM: J20.8, B96.89  ICD-9-CM: 466.0, 041.9 Acute 12/2/2020 Yes        Kidney disease, chronic, stage IV (severe, EGFR 15-29 ml/min) (HCC) (Chronic) ICD-10-CM: N18.4  ICD-9-CM: 585.4 End Stage 12/3/2020 Yes        Dehydration with hypernatremia ICD-10-CM: E87.0  ICD-9-CM: 276.0 Acute 12/3/2020 Yes        Anxiety, generalized (Chronic) ICD-10-CM: F41.1  ICD-9-CM: 300.02 Chronic 1/1/2019 Yes        Gait abnormality (Chronic) ICD-10-CM: R26.9  ICD-9-CM: 781.2 End Stage 1/1/2016 Yes        Memory loss ICD-10-CM: R41.3  ICD-9-CM: 780.93 Acute 1/1/2016 Yes        * (Principal) Septic shock ([facility] [address] ICD-10-CM: A41.9, R65.21  ICD-9-CM: 038.9, 785.52, 995.92  12/3/2020 Yes                Review of Systems:   A comprehensive review of systems was negative except for that written in the HPI. Vital Signs:    Last 24hrs VS reviewed since prior progress note.  Most recent are:  Visit Vitals  BP (!) 147/90 (BP 1 Location: Right arm, BP Patient Position: At rest)   Pulse 89   Temp 99 °F (37.2 °C)   Resp 18   Ht 5' 7\" (1.702 m)   Wt 75.5 kg (166 lb 7.2 oz)   SpO2 96%   BMI 26.07 kg/m² Intake/Output Summary (Last 24 hours) at 12/7/2020 1641  Last data filed at 12/7/2020 1027  Gross per 24 hour   Intake    Output 1300 ml   Net -1300 ml        Physical Examination:     I had a face to face encounter with this patient and independently examined them on 12/7/2020 as outlined below:          Constitutional:  No distress, cachectic, not responding to verbal commands, chronically ill appearing   ENT:  Oral mucosa moist, oropharynx benign. Resp:  Course bilaterally. No wheezing/rhonchi/rales. No accessory muscle use   CV:  Regular rhythm, normal rate, no murmurs, gallops, rubs    GI:  Soft, non distended, non tender. normoactive bowel sounds, no hepatosplenomegaly     Musculoskeletal:  No edema, warm, 2+ pulses throughout    Neurologic:  Eyes open, does not respond to verbal commands, withdraws to pain. Skin:  Good turgor, no rashes or ulcers       Data Review:    Review and/or order of clinical lab test  Review and/or order of tests in the radiology section of CPT  Review and/or order of tests in the medicine section of CPT      Labs:     Recent Labs     12/06/20 0106 12/05/20 0519   WBC 12.4* 11.4*   HGB 11.2* 10.4*   HCT 33.8* 31.7*   * 133*     Recent Labs     12/07/20 0102 12/07/20 0056 12/06/20 0106 12/05/20 0519     --  146* 151*   K 3.4*  --  3.5 3.8   *  --  114* 119*   CO2 25  --  25 24   BUN 22*  --  20 21*   CREA 0.63*  --  0.76 0.72   GLU 98  --  96 99   CA 8.0*  --  8.4* 8.5   MG  --  2.1 2.1 2.2   PHOS  --  1.9* 2.6 3.0     Recent Labs     12/07/20 0056 12/06/20 0106 12/05/20 0519   ALB 3.1* 3.4* 3.5     Recent Labs     12/07/20 0056 12/06/20 0106 12/05/20 0519   INR 1.3* 1.4* 1.2*   PTP 13.2* 14.6* 12.4*      No results for input(s): FE, TIBC, PSAT, FERR in the last 72 hours. No results found for: FOL, RBCF   No results for input(s): PH, PCO2, PO2 in the last 72 hours.   Recent Labs     12/07/20  0056 12/06/20  0106 12/05/20  0519   CPK 47 81 73     No results found for: CHOL, CHOLX, CHLST, CHOLV, HDL, HDLP, LDL, LDLC, DLDLP, TGLX, TRIGL, TRIGP, CHHD, CHHDX  Lab Results   Component Value Date/Time    Glucose (POC) 115 (H) 12/07/2020 12:33 PM    Glucose (POC) 123 (H) 12/07/2020 06:06 AM    Glucose (POC) 92 12/07/2020 12:43 AM    Glucose (POC) 108 (H) 12/06/2020 05:58 PM    Glucose (POC) 106 (H) 12/06/2020 12:16 PM     Lab Results   Component Value Date/Time    Color DARK YELLOW 12/03/2020 01:34 AM    Appearance CLEAR 12/03/2020 01:34 AM    Specific gravity 1.023 12/03/2020 01:34 AM    pH (UA) 6.5 12/03/2020 01:34 AM    Protein TRACE (A) 12/03/2020 01:34 AM    Glucose Negative 12/03/2020 01:34 AM    Ketone TRACE (A) 12/03/2020 01:34 AM    Bilirubin Negative 12/03/2020 01:34 AM    Urobilinogen 1.0 12/03/2020 01:34 AM    Nitrites Negative 12/03/2020 01:34 AM    Leukocyte Esterase TRACE (A) 12/03/2020 01:34 AM    Epithelial cells MODERATE (A) 12/03/2020 01:34 AM    Bacteria 1+ (A) 12/03/2020 01:34 AM    WBC 5-10 12/03/2020 01:34 AM    RBC 5-10 12/03/2020 01:34 AM         Medications Reviewed:     Current Facility-Administered Medications   Medication Dose Route Frequency    potassium, sodium phosphates (NEUTRA-PHOS) packet 2 Packet  2 Packet Oral ONCE    [START ON 12/8/2020] Vancomycin trough TUES 12/8 just prior 0100 dose   Other ONCE    vancomycin (VANCOCIN) 1250 mg in  ml infusion  1,250 mg IntraVENous Q12H    albuterol (PROVENTIL HFA, VENTOLIN HFA, PROAIR HFA) inhaler 2 Puff  2 Puff Inhalation Q6H RT    ipratropium (ATROVENT HFA) 17 mcg inhaler  2 Puff Inhalation Q6H RT    [Held by provider] metoprolol tartrate (LOPRESSOR) tablet 12.5 mg  12.5 mg Oral Q12H    metoprolol (LOPRESSOR) injection 2.5 mg  2.5 mg IntraVENous Q6H    carbidopa-levodopa ER (RYTARY) 36. mg per capsule 1 Cap  1 Cap Oral 5XD    famotidine (PEPCID) tablet 20 mg  20 mg Oral BID    ascorbic acid (vitamin C) (VITAMIN C) tablet 500 mg  500 mg Oral BID    cholecalciferol (VITAMIN D3) (400 Units /10 mcg) tablet 5 Tab  2,000 Units Oral DAILY    zinc sulfate (ZINCATE) 220 (50) mg capsule 1 Cap  1 Cap Oral DAILY    pramipexole (MIRAPEX) tablet 1.5 mg  1.5 mg Oral QHS    PARoxetine hcl (PAXIL) 10 mg/5 mL oral suspension 15 mg  15 mg Oral 7am    enoxaparin (LOVENOX) injection 35 mg  35 mg SubCUTAneous Q12H    dexamethasone (PF) (DECADRON) 10 mg/mL injection 6 mg  6 mg IntraVENous Q24H    glucose chewable tablet 16 g  4 Tab Oral PRN    glucagon (GLUCAGEN) injection 1 mg  1 mg IntraMUSCular PRN    dextrose 10% infusion 0-250 mL  0-250 mL IntraVENous PRN    sodium chloride (NS) flush 5-40 mL  5-40 mL IntraVENous Q8H    sodium chloride (NS) flush 5-40 mL  5-40 mL IntraVENous PRN    acetaminophen (TYLENOL) tablet 650 mg  650 mg Oral Q6H PRN    Or    acetaminophen (TYLENOL) suppository 650 mg  650 mg Rectal Q6H PRN    ondansetron (ZOFRAN) injection 4 mg  4 mg IntraVENous Q6H PRN    piperacillin-tazobactam (ZOSYN) 3.375 g in 0.9% sodium chloride (MBP/ADV) 100 mL MBP  3.375 g IntraVENous Q8H    Vancomycin Pharmacy to Dose   Other Rx Dosing/Monitoring    guaiFENesin ER (MUCINEX) tablet 600 mg  600 mg Oral Q12H    morphine injection 2 mg  2 mg IntraVENous Q4H PRN    docusate (COLACE) 50 mg/5 mL oral liquid 100 mg  100 mg Oral BID    sennosides (SENOKOT) 8.8 mg/5 mL syrup 8.8 mg  5 mL Oral QPM    insulin lispro (HUMALOG) injection   SubCUTAneous Q6H     ______________________________________________________________________  EXPECTED LENGTH OF STAY: 4d 19h  ACTUAL LENGTH OF STAY:          4                 Courtney Juarez MD

## 2020-12-07 NOTE — HOSPICE
José Miguel  [facility]  [phone]     Patient Name: Kate Allison. YOB: 1955  Age: 76 y.o. [address] [facility] RN Note:  Hospice consult received, reviewing chart. 10:30 am:  In to see patient who is alert and oriented x 2. Patient with no signs or symptoms of distress, denies any pain. RR 14 at time of my assessment, BP stable. Patient requested a drink of water and drank 75% of 8 oz cup. Primary nurse reports that he has not been taking in any solid food and only sips for past 48 hours. We will continue to follow the patient and reassess daily for any changes in status that may warrant a GIP admission. Thank you for the opportunity to be of service to this patient.

## 2020-12-07 NOTE — PROGRESS NOTES
Transition Plan of Care  RUR-32%-High  Covid positive-Hospice consult noted and referral sent to [facility]/Hospice.   Michelet Guzman RN CRM  Ext [phone]

## 2020-12-07 NOTE — PROGRESS NOTES
Bedside shift change report given to Dorothy Decker RN (oncoming nurse) by Marcus Musa RN (offgoing nurse). Report included the following information SBAR, Kardex, Intake/Output, Recent Results, and Cardiac Rhythm NSR . Patient Vitals for the past 12 hrs:   Temp Pulse Resp BP SpO2   12/07/20 0700  82 20 136/88 93 %   12/07/20 0400 100.3 °F (37.9 °C) 85 23 138/84 99 %   12/07/20 0131     99 %   12/07/20 0030 (!) 100.9 °F (38.3 °C) 86 20 132/85 100 %   12/06/20 2011     99 %   12/06/20 2000  79 24 (!) 160/90 100 %     Last 3 Recorded Weights in this Encounter    12/05/20 0700 12/06/20 0315 12/07/20 0400   Weight: 76 kg (167 lb 8.8 oz) 76.2 kg (167 lb 15.9 oz) 75.5 kg (166 lb 7.2 oz)       Problem: Risk for Spread of Infection  Goal: Prevent transmission of infectious organism to others  Description: Prevent the transmission of infectious organisms to other patients, staff members, and visitors. Outcome: Progressing Towards Goal     Problem: Patient Education:  Go to Education Activity  Goal: Patient/Family Education  Outcome: Progressing Towards Goal     Problem: Falls - Risk of  Goal: *Absence of Falls  Description: Document Beth Magaña Fall Risk and appropriate interventions in the flowsheet.   Outcome: Progressing Towards Goal  Note: Fall Risk Interventions:  Mobility Interventions: Communicate number of staff needed for ambulation/transfer, OT consult for ADLs, PT Consult for mobility concerns, Strengthening exercises (ROM-active/passive)    Mentation Interventions: Adequate sleep, hydration, pain control, Gait belt with transfers/ambulation, Reorient patient, Room close to nurse's station, Toileting rounds    Medication Interventions: Evaluate medications/consider consulting pharmacy, Patient to call before getting OOB    Elimination Interventions: Call light in reach, Toileting schedule/hourly rounds    History of Falls Interventions: Consult care management for discharge planning, Room close to nurse's station, Evaluate medications/consider consulting pharmacy         Problem: Pressure Injury - Risk of  Goal: *Prevention of pressure injury  Description: Document Sunny Scale and appropriate interventions in the flowsheet. Outcome: Progressing Towards Goal  Note: Pressure Injury Interventions:  Sensory Interventions: Assess changes in LOC, Assess need for specialty bed, Chair cushion, Discuss PT/OT consult with provider, Float heels, Keep linens dry and wrinkle-free, Maintain/enhance activity level, Minimize linen layers, Monitor skin under medical devices, Turn and reposition approx. every two hours (pillows and wedges if needed)    Moisture Interventions: Absorbent underpads, Assess need for specialty bed, Internal/External urinary devices, Limit adult briefs, Minimize layers    Activity Interventions: Assess need for specialty bed, Pressure redistribution bed/mattress(bed type), PT/OT evaluation    Mobility Interventions: Assess need for specialty bed, Chair cushion, Float heels, Turn and reposition approx.  every two hours(pillow and wedges), Pressure redistribution bed/mattress (bed type)    Nutrition Interventions: Document food/fluid/supplement intake, Discuss nutritional consult with provider, Offer support with meals,snacks and hydration    Friction and Shear Interventions: Apply protective barrier, creams and emollients, HOB 30 degrees or less, Minimize layers         Problem: Breathing Pattern - Ineffective  Goal: *Absence of hypoxia  Outcome: Progressing Towards Goal

## 2020-12-07 NOTE — PROGRESS NOTES
Pulmonary, Critical Care, and Sleep Medicine~Progress Note    Name: Angel Anderson. MRN: [id number]   : 1955 Hospital: . [address]   Date: 2020 9:48 AM Admission: 12/3/2020     Impression Plan   1. Acute hypoxic resp failure, now on room air  2. COVID + PNA  3. Suspected HAP  4. A fib with RVR  5. parkinsons  6. lewy body dementia   7. DNR  1. O2 titration above 90%, on room air    2. Strong pulmonary toilet   3. On lovenox bid dosing   4. Decadron   5. On zosyn   6. Noted palliative care; hospice has been consulted   7. We will be available again to see if needed      Daily Progression:    Feeling ok, on room air. No visible WOB   Still spiking fevers      I have reviewed the labs and previous days notes. Pertinent items are noted in HPI. OBJECTIVE:     Vital Signs:       Visit Vitals  /88 (BP 1 Location: Right arm, BP Patient Position: At rest)   Pulse 82   Temp 100.3 °F (37.9 °C)   Resp 20   Ht 5' 7\" (1.702 m)   Wt 75.5 kg (166 lb 7.2 oz)   SpO2 96%   BMI 26.07 kg/m²      Temp (24hrs), Av.8 °F (37.7 °C), Min:98.5 °F (36.9 °C), Max:100.9 °F (38.3 °C)     Intake/Output:     Last shift: No intake/output data recorded. Last 3 shifts:  1901 -  0700  In: 2056 [P.O.:240;  I.V.:4110]  Out: 3500 [Urine:3500]          Intake/Output Summary (Last 24 hours) at 2020 0948  Last data filed at 2020 0600  Gross per 24 hour   Intake 2703.33 ml   Output 1900 ml   Net 803.33 ml       Physical Exam:                                        Exam Findings Other   General: No resp distress noted, appears stated age    [de-identified]:  No ulcers, JVD not elevated, no cervical LAD    Chest: No pectus deformity, normal chest rise b/l    HEART:  No visible thrills    Lungs:  Normal expansion     ABD: Soft/NT, non rigid mildly distended    EXT: No cyanosis/clubbing/edema, normal peripheral pulses    Skin: No rashes or ulcers, no mottling    Neuro: Awake         Medications:  Current Facility-Administered Medications   Medication Dose Route Frequency    potassium, sodium phosphates (NEUTRA-PHOS) packet 2 Packet  2 Packet Oral ONCE    potassium chloride 10 mEq in 50 ml IVPB  10 mEq IntraVENous Q1H    [START ON 12/8/2020] Vancomycin trough TUES 12/8 just prior 0100 dose   Other ONCE    vancomycin (VANCOCIN) 1250 mg in  ml infusion  1,250 mg IntraVENous Q12H    albuterol (PROVENTIL HFA, VENTOLIN HFA, PROAIR HFA) inhaler 2 Puff  2 Puff Inhalation Q6H RT    ipratropium (ATROVENT HFA) 17 mcg inhaler  2 Puff Inhalation Q6H RT    [Held by provider] metoprolol tartrate (LOPRESSOR) tablet 12.5 mg  12.5 mg Oral Q12H    metoprolol (LOPRESSOR) injection 2.5 mg  2.5 mg IntraVENous Q6H    carbidopa-levodopa ER (RYTARY) 36. mg per capsule 1 Cap  1 Cap Oral 5XD    famotidine (PEPCID) tablet 20 mg  20 mg Oral BID    ascorbic acid (vitamin C) (VITAMIN C) tablet 500 mg  500 mg Oral BID    cholecalciferol (VITAMIN D3) (400 Units /10 mcg) tablet 5 Tab  2,000 Units Oral DAILY    zinc sulfate (ZINCATE) 220 (50) mg capsule 1 Cap  1 Cap Oral DAILY    pramipexole (MIRAPEX) tablet 1.5 mg  1.5 mg Oral QHS    PARoxetine hcl (PAXIL) 10 mg/5 mL oral suspension 15 mg  15 mg Oral 7am    enoxaparin (LOVENOX) injection 35 mg  35 mg SubCUTAneous Q12H    dexamethasone (PF) (DECADRON) 10 mg/mL injection 6 mg  6 mg IntraVENous Q24H    glucose chewable tablet 16 g  4 Tab Oral PRN    glucagon (GLUCAGEN) injection 1 mg  1 mg IntraMUSCular PRN    dextrose 10% infusion 0-250 mL  0-250 mL IntraVENous PRN    sodium chloride (NS) flush 5-40 mL  5-40 mL IntraVENous Q8H    sodium chloride (NS) flush 5-40 mL  5-40 mL IntraVENous PRN    acetaminophen (TYLENOL) tablet 650 mg  650 mg Oral Q6H PRN    Or    acetaminophen (TYLENOL) suppository 650 mg  650 mg Rectal Q6H PRN    ondansetron (ZOFRAN) injection 4 mg  4 mg IntraVENous Q6H PRN    piperacillin-tazobactam (ZOSYN) 3.375 g in 0.9% sodium chloride (MBP/ADV) 100 mL MBP  3.375 g IntraVENous Q8H    Vancomycin Pharmacy to Dose   Other Rx Dosing/Monitoring    guaiFENesin ER (MUCINEX) tablet 600 mg  600 mg Oral Q12H    morphine injection 2 mg  2 mg IntraVENous Q4H PRN    docusate (COLACE) 50 mg/5 mL oral liquid 100 mg  100 mg Oral BID    sennosides (SENOKOT) 8.8 mg/5 mL syrup 8.8 mg  5 mL Oral QPM    insulin lispro (HUMALOG) injection   SubCUTAneous Q6H       Labs:  ABG No results for input(s): PHI, PCO2I, PO2I, HCO3I, SO2I, FIO2I in the last 72 hours.      CBC Recent Labs     12/06/20 0106 12/05/20  0519   WBC 12.4* 11.4*   HGB 11.2* 10.4*   HCT 33.8* 31.7*   * 133*   MCV 93.6 95.8   MCH 31.0 37.3        Metabolic  Panel Recent Labs     12/07/20  0102 12/07/20  0056 12/06/20  0106 12/05/20  0519     --  146* 151*   K 3.4*  --  3.5 3.8   *  --  114* 119*   CO2 25  --  25 24   GLU 98  --  96 99   BUN 22*  --  20 21*   CREA 0.63*  --  0.76 0.72   CA 8.0*  --  8.4* 8.5   MG  --  2.1 2.1 2.2   PHOS  --  1.9* 2.6 3.0   ALB  --  3.1* 3.4* 3.5   INR  --  1.3* 1.4* 1.2*        Pertinent Labs                Tyler Steward PA-C  12/7/2020

## 2020-12-07 NOTE — CONSULTS
Palliative Medicine    Per chart review, wife is requesting hospice consult.   Will cancel palliative consult, consult hospice for comfort care

## 2020-12-08 NOTE — PROGRESS NOTES
Bedside shift change report given to Julia Torres RN (oncoming nurse) by Gera Soler RN (offgoing nurse). Report included the following information SBAR, Kardex and MAR.

## 2020-12-08 NOTE — PROGRESS NOTES
Problem: Falls - Risk of  Goal: *Absence of Falls  Description: Document Tere Denise Fall Risk and appropriate interventions in the flowsheet.   Outcome: Progressing Towards Goal  Note: Fall Risk Interventions:  Mobility Interventions: Communicate number of staff needed for ambulation/transfer    Mentation Interventions: Adequate sleep, hydration, pain control    Medication Interventions: Evaluate medications/consider consulting pharmacy    Elimination Interventions: Call light in reach    History of Falls Interventions: Bed/chair exit alarm

## 2020-12-08 NOTE — PROGRESS NOTES
Bedside shift change report given to Lennox Romance RN (oncoming nurse) by Yasmin Landers RN (offgoing nurse). Report included the following information SBAR.

## 2020-12-08 NOTE — CONSULTS
Palliative Medicine    Goals clear for comfort and symptom management meds in place.   Hospice team is following  Will cancel palliative consult, please call if needed [phone]

## 2020-12-08 NOTE — HOSPICE
In to see patient who is resting comfortably, does not respond to touch or voice. No signs of distress. RR shallow 14-16, no supplemental O2 needed for comfort. Patient has received 3 does of morphine and 3 doses of Ativan in the past 24 hours with good result. With no unmanaged symptoms, patient is not meeting GIP LOC. We will continue to follow the patient daily for any changes in status warranting a change in LOC.      Douglas HERNANDEZ [facility]  Nurse Liaison  [facility]  [phone] ( [facility])  [phone] ( office)

## 2020-12-08 NOTE — PROGRESS NOTES
[facility] [facility] Adult  Hospitalist Group                                                                                          Hospitalist Progress Note  Esteban Shrestha MD  Answering service: [phone] OR [phone] from in house phone        Date of Service:  2020  NAME:  Nico Price. :  1955  MRN:  [id number]      Admission Summary:   Patient Elba Brothers is a 67-year-old male admitted to ICU with sepsis/ hypotension/acute respiratory failure.  Briseida Guzman was a transfer from [facility]. Briseida Guzman has past medical history for asthma, dementia, musculoskeletal disorder.  Patient presented with life-threatening hypotension and altered mental status from [facility]. Chest ct noticed Left lower lobe consolidation and volume loss. Obstructed left lower lobe  bronchus    Interval history / Subjective:   Resting comfortably. No distress. Not responding to voice. Assessment & Plan:     Septic shock - resolved  HCAP  COVID19  Acute hypoxic respiratory failure - slowly improving  + Coag neg staph blood cultures - suspect contaminant  Afib with RVR -   Hypernatremia -   SUMEET - Cr on admit 2.9, from normal baseline - improved  H/o Parkinsons, Lewy body dementia  - Continue levodopa-carbidopa if able to take PO   - Continue comfort meds: ativan, morphine  - DC all abx, IVF, and blood draws per family wishes.   - Hospice consulted, palliative consulted.        Code status: DNR  DVT prophylaxis: none, comfort     Care Plan discussed with: Patient/Family  Anticipated Disposition: Home w/Family  Anticipated Discharge: Greater than 48 hours Hospice IP vs. SNF     Hospital Problems  Date Reviewed: 2020          Codes Class Noted POA    Sepsis due to COVID-19 [facility]) ICD-10-CM: U07.1, A41.89  ICD-9-CM: 079.89, 995.91 Acute 12/3/2020 Yes        COVID-19 virus infection ICD-10-CM: U07.1  ICD-9-CM: 079.89 Acute 2020 Yes        Acute kidney injury superimposed on CKD ([facility] ICD-10-CM: N17.9, N18.9  ICD-9-CM: 866.00, 585.9 Acute 12/3/2020 Yes        Severe dehydration ICD-10-CM: E86.0  ICD-9-CM: 276.51 Acute 12/3/2020 Yes        Parkinson's disease, Lewy body ([facility] ICD-10-CM: G31.83  ICD-9-CM: 332.0, 331.82 End Stage 12/3/2020 Yes        Hypovolemic shock ([facility] ICD-10-CM: R57.1  ICD-9-CM: 785.59 Acute 12/3/2020 Yes        Frail elderly (Chronic) ICD-10-CM: R54  ICD-9-CM: 300 Chronic 12/3/2020 Yes        Parkinson's disease dementia ([facility] (Chronic) ICD-10-CM: G20, F02.80  ICD-9-CM: 332.0, 294.10 End Stage 12/3/2020 Yes        Acute infective tracheobronchitis ICD-10-CM: J20.9  ICD-9-CM: 466.0 Acute 12/2/2020 Yes        Acute bacterial bronchitis ICD-10-CM: J20.8, B96.89  ICD-9-CM: 466.0, 041.9 Acute 12/2/2020 Yes        Kidney disease, chronic, stage IV (severe, EGFR 15-29 ml/min) (HCC) (Chronic) ICD-10-CM: N18.4  ICD-9-CM: 585.4 End Stage 12/3/2020 Yes        Dehydration with hypernatremia ICD-10-CM: E87.0  ICD-9-CM: 276.0 Acute 12/3/2020 Yes        Anxiety, generalized (Chronic) ICD-10-CM: F41.1  ICD-9-CM: 300.02 Chronic 1/1/2019 Yes        Gait abnormality (Chronic) ICD-10-CM: R26.9  ICD-9-CM: 781.2 End Stage 1/1/2016 Yes        Memory loss ICD-10-CM: R41.3  ICD-9-CM: 780.93 Acute 1/1/2016 Yes        * (Principal) Septic shock ([facility] ICD-10-CM: A41.9, R65.21  ICD-9-CM: 038.9, 785.52, 995.92  12/3/2020 Yes                Review of Systems:   A comprehensive review of systems was negative except for that written in the HPI. Vital Signs:    Last 24hrs VS reviewed since prior progress note.  Most recent are:  Visit Vitals  BP 91/64 (BP 1 Location: Right arm, BP Patient Position: At rest)   Pulse 74   Temp 98.6 °F (37 °C)   Resp 20   Ht 5' 7\" (1.702 m)   Wt 74.3 kg (163 lb 11.2 oz)   SpO2 (!) 89%   BMI 25.64 kg/m²         Intake/Output Summary (Last 24 hours) at 12/8/2020 1621  Last data filed at 12/8/2020 1448  Gross per 24 hour   Intake    Output 1900 ml   Net -1900 ml Physical Examination:     I had a face to face encounter with this patient and independently examined them on 12/8/2020 as outlined below:          Constitutional:  No distress, cachectic, not responding to verbal commands, chronically ill appearing   ENT:  Oral mucosa moist, oropharynx benign. Resp:  Course bilaterally. No wheezing/rhonchi/rales. No accessory muscle use   CV:  Regular rhythm, normal rate, no murmurs, gallops, rubs    GI:  Soft, non distended, non tender. normoactive bowel sounds, no hepatosplenomegaly     Musculoskeletal:  No edema, warm, 2+ pulses throughout    Neurologic:  Eyes closed, does not respond to verbal commands, withdraws to pain. Skin:  Good turgor, no rashes or ulcers       Data Review:    Review and/or order of clinical lab test  Review and/or order of tests in the radiology section of CPT  Review and/or order of tests in the medicine section of CPT      Labs:     Recent Labs     12/06/20 0106   WBC 12.4*   HGB 11.2*   HCT 33.8*   *     Recent Labs     12/07/20 0102 12/07/20 0056 12/06/20 0106     --  146*   K 3.4*  --  3.5   *  --  114*   CO2 25  --  25   BUN 22*  --  20   CREA 0.63*  --  0.76   GLU 98  --  96   CA 8.0*  --  8.4*   MG  --  2.1 2.1   PHOS  --  1.9* 2.6     Recent Labs     12/07/20 0056 12/06/20 0106   ALB 3.1* 3.4*     Recent Labs     12/07/20 0056 12/06/20 0106   INR 1.3* 1.4*   PTP 13.2* 14.6*      No results for input(s): FE, TIBC, PSAT, FERR in the last 72 hours. No results found for: FOL, RBCF   No results for input(s): PH, PCO2, PO2 in the last 72 hours.   Recent Labs     12/07/20 0056 12/06/20 0106   CPK 47 81     No results found for: CHOL, CHOLX, CHLST, CHOLV, HDL, HDLP, LDL, LDLC, DLDLP, TGLX, TRIGL, TRIGP, CHHD, CHHDX  Lab Results   Component Value Date/Time    Glucose (POC) 115 (H) 12/07/2020 12:33 PM    Glucose (POC) 123 (H) 12/07/2020 06:06 AM    Glucose (POC) 92 12/07/2020 12:43 AM    Glucose (POC) 108 (H) 12/06/2020 05:58 PM    Glucose (POC) 106 (H) 12/06/2020 12:16 PM     Lab Results   Component Value Date/Time    Color DARK YELLOW 12/03/2020 01:34 AM    Appearance CLEAR 12/03/2020 01:34 AM    Specific gravity 1.023 12/03/2020 01:34 AM    pH (UA) 6.5 12/03/2020 01:34 AM    Protein TRACE (A) 12/03/2020 01:34 AM    Glucose Negative 12/03/2020 01:34 AM    Ketone TRACE (A) 12/03/2020 01:34 AM    Bilirubin Negative 12/03/2020 01:34 AM    Urobilinogen 1.0 12/03/2020 01:34 AM    Nitrites Negative 12/03/2020 01:34 AM    Leukocyte Esterase TRACE (A) 12/03/2020 01:34 AM    Epithelial cells MODERATE (A) 12/03/2020 01:34 AM    Bacteria 1+ (A) 12/03/2020 01:34 AM    WBC 5-10 12/03/2020 01:34 AM    RBC 5-10 12/03/2020 01:34 AM         Medications Reviewed:     Current Facility-Administered Medications   Medication Dose Route Frequency    morphine injection 2 mg  2 mg IntraVENous Q15MIN PRN    glycopyrrolate (ROBINUL) injection 0.2 mg  0.2 mg IntraVENous Q4H PRN    LORazepam (ATIVAN) injection 1 mg  1 mg IntraVENous Q15MIN PRN    albuterol (PROVENTIL HFA, VENTOLIN HFA, PROAIR HFA) inhaler 2 Puff  2 Puff Inhalation Q6H RT    ipratropium (ATROVENT HFA) 17 mcg inhaler  2 Puff Inhalation Q6H RT    carbidopa-levodopa ER (RYTARY) 36. mg per capsule 1 Cap  1 Cap Oral 5XD    pramipexole (MIRAPEX) tablet 1.5 mg  1.5 mg Oral QHS    dexamethasone (PF) (DECADRON) 10 mg/mL injection 6 mg  6 mg IntraVENous Q24H    ondansetron (ZOFRAN) injection 4 mg  4 mg IntraVENous Q6H PRN     ______________________________________________________________________  EXPECTED LENGTH OF STAY: 4d 19h  ACTUAL LENGTH OF STAY:          5                 Andra Salinas MD

## 2020-12-08 NOTE — PROGRESS NOTES
TRANSFER - OUT REPORT:    Verbal report given to [facility] EARLINE RN(name) on Judy Faustin.  being transferred to (unit) for routine progression of care       Report consisted of patients Situation, Background, Assessment and   Recommendations(SBAR). Information from the following report(s) SBAR, Kardex, ED Summary, Intake/Output, MAR, Accordion, Recent Results, and Cardiac Rhythm Afib  was reviewed with the receiving nurse. Lines:   Quad Lumen 12/03/20 Right; Anterior Internal jugular (Active)   Central Line Being Utilized Yes 12/07/20 1603   Criteria for Appropriate Use Hemodynamically unstable, requiring monitoring lines, vasopressors, or volume resuscitation 12/07/20 1603   Site Assessment Clean, dry, & intact 12/07/20 1603   Infiltration Assessment 0 12/07/20 1603   Affected Extremity/Extremities Color distal to insertion site pink (or appropriate for race) 12/07/20 1603   Date of Last Dressing Change 12/03/20 12/07/20 1603   Dressing Status Clean, dry, & intact 12/07/20 1603   Dressing Type Disk with Chlorhexadine gluconate (CHG); Transparent 12/07/20 1603   Action Taken Open ports on tubing capped 12/07/20 1603   Proximal Hub Color/Line Status White; Infusing 12/07/20 1603   Positive Blood Return (Medial Site) Yes 12/07/20 1603   Medial 1 Hub Color/Line Status Gray;Capped;Flushed 12/07/20 1603   Positive Blood Return (Lateral Site) Yes 12/07/20 1603   Medial 2 Hub Color/Line Status Blue;Capped;Flushed 12/07/20 1603   Positive Blood Return (Site #3) Yes 12/07/20 1603   Distal Hub Color/Line Status Brown;Capped;Flushed 12/07/20 1603   Positive Blood Return (Site #4) Yes 12/07/20 1603   Alcohol Cap Used Yes 12/07/20 1603       Peripheral IV 12/03/20 Left Antecubital (Active)   Site Assessment Intact;Dry 12/07/20 1113   Phlebitis Assessment 0 12/07/20 1113   Infiltration Assessment 0 12/07/20 1113   Dressing Status Old drainage 12/07/20 1113   Dressing Type Tape;Transparent 12/07/20 1113   Hub Color/Line Status Pink;Capped;Flushed 12/07/20 1113   Action Taken Open ports on tubing capped 12/07/20 1113   Alcohol Cap Used Yes 12/07/20 1113          Opportunity for questions and clarification was provided.       Patient transported with:   Patient-specific medications from [facility]           Problem: Breathing Pattern - Ineffective  Goal: *Use of effective breathing techniques  Outcome: Progressing Towards Goal     Problem: Pressure Injury - Risk of  Goal: *Prevention of pressure injury  Outcome: Progressing Towards Goal

## 2020-12-09 NOTE — PROGRESS NOTES
CM notified of consult for hospice. Chart reviewed. CM notified pt currently does not qualify for GIP. CM received call from [facility] liaison, Antonia Osuna; CM notified facility has hospice program that pt is eligible for. CM spoke with pt's spouse, Carlee Vásquez (ph#: [phone]), to notify pt currently not eligible for GIP and of hospice program through facility; spouse agreeable. CM contacted [facility] liaison; liaison to return CM call with further information. CM inquired to MD on if pt is medically ready for discharge; awaiting response. CM will continue to follow. Gaby Stanford RN, BSN Care Management Department 1630: CM awaiting response from MD regarding disposition. Pt's spouse inquired on visitation policy at facility; per [facility] liaison, visitation is not allowed right now due to state survey/ after survey, visitation can resume as long as visitor has N95 mask. CM notified spouse, spouse declined discharge to facility at this time due to visitation policy. RN notified. CM notified placed BLS transport with [facility] on WILL CALL. CM will continue to follow. Gaby Stanford RN, BSN- Care Management

## 2020-12-09 NOTE — PROGRESS NOTES
[facility] Adult  Hospitalist Group Hospitalist Progress Note Anurag Stevens MD 
Answering service: [phone] OR [phone] from in house phone Date of Service:  2020 NAME:  Brian Sheets. :  1955 MRN:  [id number] Admission Summary:  
Patient Maximo Lauren is a 50-year-old male admitted to ICU with sepsis/ hypotension/acute respiratory failure.  Yolette Live was a transfer from [facility]. Yolette Live has past medical history for asthma, dementia, musculoskeletal disorder.  Patient presented with life-threatening hypotension and altered mental status from [facility]. Chest ct noticed Left lower lobe consolidation and volume loss. Obstructed left lower lobe 
bronchus Interval history / Subjective:  
Resting comfortably, No issues overnight. Received about 3-4 doses morphine, ativan. Assessment & Plan:  
 
Septic shock - resolved HCAP 
COVID19 Acute hypoxic respiratory failure - slowly improving 
+ Coag neg staph blood cultures - suspect contaminant Afib with RVR - Hypernatremia - SUMEET - Cr on admit 2.9, from normal baseline - improved H/o Parkinsons, Lewy body dementia 
- hold levodopa-carbidopa, patient has not been able to take PO 
- Continue comfort meds: ativan, morphine - DC all abx, IVF, and blood draws per family wishes.  
- Hospice consulted, palliative consulted. Code status: DNR 
DVT prophylaxis: none, comfort Care Plan discussed with: Patient/Family Anticipated Disposition: Home w/Family Anticipated Discharge: Greater than 48 hours Hospice IP vs. SNF Hospital Problems  Date Reviewed: 2020 Codes Class Noted POA Sepsis due to COVID-19 [facility]) ICD-10-CM: U07.1, A41.89 ICD-9-CM: 079.89, 995.91 Acute 12/3/2020 Yes COVID-19 virus infection ICD-10-CM: U07.1 ICD-9-CM: 079.89 Acute 2020 Yes Acute kidney injury superimposed on CKD ([facility] ICD-10-CM: N17.9, N18.9 ICD-9-CM: 866.00, 585.9 Acute 12/3/2020 Yes Severe dehydration ICD-10-CM: E86.0 ICD-9-CM: 276.51 Acute 12/3/2020 Yes Parkinson's disease, Lewy body ([facility] ICD-10-CM: G31.83 ICD-9-CM: 332.0, 331.82 End Stage 12/3/2020 Yes Hypovolemic shock ([facility] ICD-10-CM: R57.1 ICD-9-CM: 785.59 Acute 12/3/2020 Yes Frail elderly (Chronic) ICD-10-CM: R54 
ICD-9-CM: 130 Chronic 12/3/2020 Yes Parkinson's disease dementia ([facility] (Chronic) ICD-10-CM: G20, F02.80 ICD-9-CM: 332.0, 294.10 End Stage 12/3/2020 Yes Acute infective tracheobronchitis ICD-10-CM: J20.9 ICD-9-CM: 466.0 Acute 12/2/2020 Yes Acute bacterial bronchitis ICD-10-CM: J20.8, B96.89 
ICD-9-CM: 466.0, 041.9 Acute 12/2/2020 Yes Kidney disease, chronic, stage IV (severe, EGFR 15-29 ml/min) (HCC) (Chronic) ICD-10-CM: N18.4 ICD-9-CM: 585.4 End Stage 12/3/2020 Yes Dehydration with hypernatremia ICD-10-CM: E87.0 ICD-9-CM: 276.0 Acute 12/3/2020 Yes Anxiety, generalized (Chronic) ICD-10-CM: F41.1 ICD-9-CM: 300.02 Chronic 1/1/2019 Yes Gait abnormality (Chronic) ICD-10-CM: R26.9 ICD-9-CM: 781.2 End Stage 1/1/2016 Yes Memory loss ICD-10-CM: R41.3 ICD-9-CM: 780.93 Acute 1/1/2016 Yes * (Principal) Septic shock ([facility] ICD-10-CM: A41.9, R65.21 ICD-9-CM: 038.9, 785.52, 995.92  12/3/2020 Yes Review of Systems: A comprehensive review of systems was negative except for that written in the HPI. Vital Signs:  
 Last 24hrs VS reviewed since prior progress note. Most recent are: 
Visit Vitals BP (!) 136/93 (BP 1 Location: Left arm, BP Patient Position: At rest) Pulse 89 Temp 98 °F (36.7 °C) Resp 14 Ht 5' 7\" (1.702 m) Wt 74.3 kg (163 lb 11.2 oz) SpO2 90% BMI 25.64 kg/m² Intake/Output Summary (Last 24 hours) at 12/9/2020 1519 Last data filed at 12/9/2020 0052 Gross per 24 hour Intake  Output 50 ml Net -50 ml Physical Examination:  
 
I had a face to face encounter with this patient and independently examined them on 12/9/2020 as outlined below: 
 
     
Constitutional:  No distress, cachectic, not responding to verbal commands, chronically ill appearing ENT:  Oral mucosa moist, oropharynx benign. Resp:  Course bilaterally. No wheezing/rhonchi/rales. No accessory muscle use CV:  Regular rhythm, normal rate, no murmurs, gallops, rubs GI:  Soft, non distended, non tender. normoactive bowel sounds, no hepatosplenomegaly Musculoskeletal:  No edema, warm, 2+ pulses throughout Neurologic:  Eyes closed, does not respond to verbal commands, withdraws to pain. Skin:  Good turgor, no rashes or ulcers Data Review:  
 Review and/or order of clinical lab test 
Review and/or order of tests in the radiology section of CPT Review and/or order of tests in the medicine section of CPT Labs:  
 
No results for input(s): WBC, HGB, HCT, PLT, HGBEXT, HCTEXT, PLTEXT, HGBEXT, HCTEXT, PLTEXT in the last 72 hours. Recent Labs 12/07/20 
0102 12/07/20 
4137   --   
K 3.4*  --   
*  --   
CO2 25  --   
BUN 22*  --   
CREA 0.63*  --   
GLU 98  --   
CA 8.0*  --   
MG  --  2.1 PHOS  --  1.9* Recent Labs 12/07/20 
0056 ALB 3.1* Recent Labs 12/07/20 
0056 INR 1.3* PTP 13.2* No results for input(s): FE, TIBC, PSAT, FERR in the last 72 hours. No results found for: FOL, RBCF No results for input(s): PH, PCO2, PO2 in the last 72 hours. Recent Labs 12/07/20 
0056 CPK 47 No results found for: CHOL, CHOLX, CHLST, CHOLV, HDL, HDLP, LDL, LDLC, DLDLP, TGLX, TRIGL, TRIGP, CHHD, CHHDX Lab Results Component Value Date/Time  Glucose (POC) 115 (H) 12/07/2020 12:33 PM  
 Glucose (POC) 123 (H) 12/07/2020 06:06 AM  
 Glucose (POC) 92 12/07/2020 12:43 AM  
 Glucose (POC) 108 (H) 12/06/2020 05:58 PM  
 Glucose (POC) 106 (H) 12/06/2020 12:16 PM  
 
Lab Results Component Value Date/Time Color DARK YELLOW 12/03/2020 01:34 AM  
 Appearance CLEAR 12/03/2020 01:34 AM  
 Specific gravity 1.023 12/03/2020 01:34 AM  
 pH (UA) 6.5 12/03/2020 01:34 AM  
 Protein TRACE (A) 12/03/2020 01:34 AM  
 Glucose Negative 12/03/2020 01:34 AM  
 Ketone TRACE (A) 12/03/2020 01:34 AM  
 Bilirubin Negative 12/03/2020 01:34 AM  
 Urobilinogen 1.0 12/03/2020 01:34 AM  
 Nitrites Negative 12/03/2020 01:34 AM  
 Leukocyte Esterase TRACE (A) 12/03/2020 01:34 AM  
 Epithelial cells MODERATE (A) 12/03/2020 01:34 AM  
 Bacteria 1+ (A) 12/03/2020 01:34 AM  
 WBC 5-10 12/03/2020 01:34 AM  
 RBC 5-10 12/03/2020 01:34 AM  
 
 
 
Medications Reviewed:  
 
Current Facility-Administered Medications Medication Dose Route Frequency  sodium chloride (NS) flush 5-40 mL  5-40 mL IntraVENous Q8H  
 sodium chloride (NS) flush 10 mL  10 mL IntraVENous PRN  
 morphine injection 2 mg  2 mg IntraVENous Q15MIN PRN  
 glycopyrrolate (ROBINUL) injection 0.2 mg  0.2 mg IntraVENous Q4H PRN  
 LORazepam (ATIVAN) injection 1 mg  1 mg IntraVENous Q15MIN PRN  
 albuterol (PROVENTIL HFA, VENTOLIN HFA, PROAIR HFA) inhaler 2 Puff  2 Puff Inhalation Q6H RT  
 ipratropium (ATROVENT HFA) 17 mcg inhaler  2 Puff Inhalation Q6H RT  
 [Held by provider] carbidopa-levodopa ER (RYTARY) 36. mg per capsule 1 Cap  1 Cap Oral 5XD  [Held by provider] pramipexole (MIRAPEX) tablet 1.5 mg  1.5 mg Oral QHS  dexamethasone (PF) (DECADRON) 10 mg/mL injection 6 mg  6 mg IntraVENous Q24H  
 ondansetron (ZOFRAN) injection 4 mg  4 mg IntraVENous Q6H PRN  
 
______________________________________________________________________ EXPECTED LENGTH OF STAY: 4d 19h ACTUAL LENGTH OF STAY:          6 
 
            
[facility] MD

## 2020-12-09 NOTE — PROGRESS NOTES
Bedside shift change report given to Danielle Delvalle (oncoming nurse) by Zoraida Morales RN (offgoing nurse). Report included the following information SBAR, Kardex, MAR and Recent Results.

## 2020-12-09 NOTE — PROGRESS NOTES
Problem: Risk for Spread of Infection  Goal: Prevent transmission of infectious organism to others  Description: Prevent the transmission of infectious organisms to other patients, staff members, and visitors. Outcome: Progressing Towards Goal     Problem: Pressure Injury - Risk of  Goal: *Prevention of pressure injury  Description: Document Sunny Scale and appropriate interventions in the flowsheet.   Outcome: Progressing Towards Goal  Note: Pressure Injury Interventions:  Sensory Interventions: Assess need for specialty bed, Monitor skin under medical devices, Pressure redistribution bed/mattress (bed type)    Moisture Interventions: Absorbent underpads, Apply protective barrier, creams and emollients, Maintain skin hydration (lotion/cream)    Activity Interventions: Pressure redistribution bed/mattress(bed type), Increase time out of bed    Mobility Interventions: Pressure redistribution bed/mattress (bed type)    Nutrition Interventions: Document food/fluid/supplement intake    Friction and Shear Interventions: Apply protective barrier, creams and emollients        Problem: Breathing Pattern - Ineffective  Goal: *Use of effective breathing techniques  Outcome: Progressing Towards Goal

## 2020-12-09 NOTE — PROGRESS NOTES
Bedside and Verbal shift change report given to Kathi Bo (oncoming nurse) by Marina Damon (offgoing nurse). Report included the following information SBAR, Kardex and MAR.

## 2020-12-10 PROBLEM — E86.0 DEHYDRATION WITH HYPERNATREMIA: Status: RESOLVED | Noted: 2020-01-01 | Resolved: 2020-01-01

## 2020-12-10 PROBLEM — E86.0 SEVERE DEHYDRATION: Status: RESOLVED | Noted: 2020-01-01 | Resolved: 2020-01-01

## 2020-12-10 PROBLEM — U07.1 SEPSIS DUE TO COVID-19 (HCC): Status: RESOLVED | Noted: 2020-01-01 | Resolved: 2020-01-01

## 2020-12-10 PROBLEM — E87.0 DEHYDRATION WITH HYPERNATREMIA: Status: RESOLVED | Noted: 2020-01-01 | Resolved: 2020-01-01

## 2020-12-10 PROBLEM — R65.21 SEPTIC SHOCK (HCC): Status: RESOLVED | Noted: 2020-01-01 | Resolved: 2020-01-01

## 2020-12-10 PROBLEM — B96.89 ACUTE BACTERIAL BRONCHITIS: Status: RESOLVED | Noted: 2020-01-01 | Resolved: 2020-01-01

## 2020-12-10 PROBLEM — N17.9 ACUTE KIDNEY INJURY SUPERIMPOSED ON CKD (HCC): Status: RESOLVED | Noted: 2020-01-01 | Resolved: 2020-01-01

## 2020-12-10 PROBLEM — A41.9 SEPTIC SHOCK (HCC): Status: RESOLVED | Noted: 2020-01-01 | Resolved: 2020-01-01

## 2020-12-10 PROBLEM — J20.8 ACUTE BACTERIAL BRONCHITIS: Status: RESOLVED | Noted: 2020-01-01 | Resolved: 2020-01-01

## 2020-12-10 PROBLEM — N18.9 ACUTE KIDNEY INJURY SUPERIMPOSED ON CKD (HCC): Status: RESOLVED | Noted: 2020-01-01 | Resolved: 2020-01-01

## 2020-12-10 PROBLEM — A41.89 SEPSIS DUE TO COVID-19 (HCC): Status: RESOLVED | Noted: 2020-01-01 | Resolved: 2020-01-01

## 2020-12-10 PROBLEM — U07.1 COVID-19 VIRUS INFECTION: Status: RESOLVED | Noted: 2020-01-01 | Resolved: 2020-01-01

## 2020-12-10 NOTE — PROGRESS NOTES
LUCHO: 1. RUR-15% 2. BLS transport today to [facility]. CM spoke to CARLOS Larson at [facility]. She confirmed the patient will go to room 9241. Report can be called to [phone]. CM will fax updated clinical information to her at [phone]. The patient wife will be visiting with him here this afternoon before he discharges. Patient will not be allowed any visitors at [facility] when he returns until after 14 days. Medicare pt has received, reviewed, and signed 2nd IM letter informing them of their right to appeal the discharge. Signed copy has been placed on pt bedside chart.  
 
 
Domenico Shrestha, [facility]

## 2020-12-10 NOTE — HOSPICE
Hospice Liaison Note: 
 
Chart reviewed for updates in plan of care. At 14:30,  patient has received  1 PRN dose of Lorazepam, and 1 PRN dose of Morphine since 01:00am. Per unit nurse, patient has had change in status, and transportation was cancelled for patient to return to [facility] for today. Nurse, Roxann Barboza RN, reports patient respirations are shallow and appear rapid. She reports patient brow is furrowed. Nurse requested that hospice come bedside to support family and reassess patient. Bedside assessment made, patient's wife and son are in the room with pt. Support offered to wife, and she states that she has noticed that her  appears to be having some distress. Reviewed discharge plan, and reflective listening. Dr. Pierre Malone has reviewed the changes nursing staff has expressed. Liaison offered plan for hospice team monitoring patient for symptom needs over night, and plan to meet with family in the am on Friday. Pt wife states that she is relieved, and appreciates hospice meeting in the am with Dr. Pierre Malone. If patient meets GIP LOC, plan will be to admit pt into hospice care in the am, if family agree. Plan to meet with family at 10:00am 
 
Charge nurse expressed her concern that family is requiring emotional support, and she requests Dr. Pierre Malone review current medication plan for best symptom management. Dr. Pierre Malone notified and will review. Sharyle Filbert, RN, [facility] Nurse Liaison [phone] [place] [phone] Office

## 2020-12-10 NOTE — PROGRESS NOTES
Bedside and Verbal shift change report given to Vance Stevenson (oncoming nurse) by Kenny Manning (offgoing nurse). Report included the following information SBAR, Kardex, MAR and Recent Results.

## 2020-12-10 NOTE — DISCHARGE SUMMARY
Discharge Summary PATIENT ID: Robert Torre MRN: [id number] YOB: 1955 DATE OF ADMISSION: 12/3/2020  1:18 AM   
DATE OF DISCHARGE: 12/10/2020 PRIMARY CARE PROVIDER: Tom Reed MD  
 
ATTENDING PHYSICIAN: Jaymie De La Vega MD  
DISCHARGING PROVIDER: Jaymie De La Vega MD   
To contact this individual call [phone] and ask the  to page. If unavailable ask to be transferred the Adult Hospitalist Department. CONSULTATIONS: IP CONSULT TO PULMONOLOGY PROCEDURES/SURGERIES: * No surgery found * [address] COURSE:  
 
Per recent note:  
 
 
  
Admission Summary:  
Patient Amber Lee is a 58-year-old male admitted to ICU with sepsis/ hypotension/acute respiratory failure.   Patient was a transfer from [facility] has past medical history for asthma, dementia, musculoskeletal disorder.  Patient presented with life-threatening hypotension and altered mental status from [address] [place] [address].  
Chest ct noticed Left lower lobe consolidation and volume loss. Obstructed left lower lobe 
bronchus 
  Interval history / Subjective:  
Resting comfortably, No issues overnight. Received about 3-4 doses morphine, ativan.   
  
Assessment & Plan:  
  
Septic shock - resolved HCAP 
COVID19 Acute hypoxic respiratory failure - slowly improving 
+ Coag neg staph blood cultures - suspect contaminant Afib with RVR - Hypernatremia - SUMEET - Cr on admit 2.9, from normal baseline - improved H/o Parkinsons, Lewy body dementia 
- hold levodopa-carbidopa, patient has not been able to take PO 
- Continue comfort meds: ativan, morphine - DC all abx, IVF, and blood draws per family wishes.  
- Hospice consulted, palliative consulted.   
 
DISPOISITON IS COMFORT/HOSPICE ANTICIPATED  
  
Code status: DNR 
DVT prophylaxis: none, comfort  
  
Care Plan discussed with: Patient/Family 
  
   
  
 
 
 
DISCHARGE DIAGNOSES / PLAN:   
 
 1.  AS ABOVE   
 
ADDITIONAL CARE RECOMMENDATIONS:  
NA  
 
PENDING TEST RESULTS:  
At the time of discharge the following test results are still pending: NA 
 
FOLLOW UP APPOINTMENTS:   FOLLOW UP AS NEEDED. Follow-up Information Follow up With Specialties Details Why Contact Info Iwona Goodman MD Family Medicine   [address] [place] 
[phone] DIET:COMFORT  
ACTIVITY: Activity as tolerated DISCHARGE MEDICATIONS: 
Current Discharge Medication List  
  
START taking these medications Details LORazepam (INTENSOL) 2 mg/mL concentrated solution Take 0.5 mL by mouth every eight (8) hours as needed for Agitation or Anxiety. Max Daily Amount: 3 mg. Qty: 30 mL, Refills: 0 Associated Diagnoses: Dementia due to Parkinson's disease without behavioral disturbance ([facility])  
  
albuterol (PROVENTIL HFA, VENTOLIN HFA, PROAIR HFA) 90 mcg/actuation inhaler Take 2 Puffs by inhalation every four (4) hours as needed for Wheezing. Qty: 1 Inhaler, Refills: 0  
  
ipratropium (ATROVENT HFA) 17 mcg/actuation inhaler Take 2 Puffs by inhalation every six (6) hours as needed for Wheezing. Qty: 1 Inhaler, Refills: 0  
  
glycopyrrolate (ROBINUL) 0.2 mg/mL injection 1 mL by IntraVENous route every four (4) hours as needed (heavy secretions,oral). Qty: 20 mL, Refills: 0 CONTINUE these medications which have CHANGED Details  
morphine (ROXANOL) 100 mg/5 mL (20 mg/mL) concentrated solution Take 0.25 mL by mouth every four (4) hours as needed for Pain for up to 5 days. Max Daily Amount: 30 mg. 
Qty: 118 mL, Refills: 0 Associated Diagnoses: Dementia due to Parkinson's disease without behavioral disturbance ([facility] STOP taking these medications  
  
 carbidopa-levodopa ER (Rytary) 48. mg per capsule Comments:  
Reason for Stopping:   
   
 polyethylene glycol (Miralax) 17 gram packet Comments:  
Reason for Stopping: ZINC GLUCONATE PO Comments:  
Reason for Stopping:   
   
 ascorbic acid, vitamin C, (Vitamin C) 500 mg tablet Comments:  
Reason for Stopping:   
   
 cholecalciferol (Vitamin D3) (1000 Units /25 mcg) tablet Comments:  
Reason for Stopping: PARoxetine (PaxiL) 10 mg tablet Comments:  
Reason for Stopping: QUEtiapine (SEROqueL) 25 mg tablet Comments:  
Reason for Stopping:   
   
 pramipexole (Mirapex) 1.5 mg tablet Comments:  
Reason for Stopping: QUEtiapine SR (SEROquel XR) 150 mg sr tablet Comments:  
Reason for Stopping:   
   
 cyclobenzaprine (FLEXERIL) 5 mg tablet Comments:  
Reason for Stopping:   
   
 acetaminophen (TYLENOL) 325 mg tablet Comments:  
Reason for Stopping:   
   
 bisacodyL (Dulcolax, bisacodyl,) 10 mg supp Comments:  
Reason for Stopping:   
   
 acetaminophen (TYLENOL) 650 mg suppository Comments:  
Reason for Stopping:   
   
 pramipexole (MIRAPEX) 1 mg tablet Comments:  
Reason for Stopping:   
   
 traZODone (DESYREL) 150 mg tablet Comments:  
Reason for Stopping:   
   
 donepezil (ARICEPT) 5 mg tablet Comments:  
Reason for Stopping:   
   
  
 
 
 
NOTIFY YOUR PHYSICIAN FOR ANY OF THE FOLLOWING:  
Fever over 101 degrees for 24 hours. Chest pain, shortness of breath, fever, chills, nausea, vomiting, diarrhea, change in mentation, falling, weakness, bleeding. Severe pain or pain not relieved by medications. Or, any other signs or symptoms that you may have questions about. DISPOSITION: 
  Home With: 
 OT  PT  HH  RN  
  
X Long term SNF/Inpatient Rehab Independent/assisted living Hospice Other:  
 
 
PATIENT CONDITION AT DISCHARGE:  
 
Functional status X Poor Deconditioned Independent Cognition Salvador Mcnally Forgetful X Dementia Catheters/lines (plus indication) [place] PICC   
 PEG   
X None Code status Full code X DNR   
 
  
CHRONIC MEDICAL DIAGNOSES: 
 Problem List as of 12/10/2020 Date Reviewed: 12/10/2020 Codes Class Noted - Resolved RESOLVED: Sepsis due to COVID-19 [facility]) ICD-10-CM: U07.1, A41.89 ICD-9-CM: 079.89, 995.91 Acute 12/3/2020 - 12/10/2020 Parkinson's disease, Lewy body ([facility] ICD-10-CM: G31.83 ICD-9-CM: 332.0, 331.82 End Stage 12/3/2020 - Present Hypov[facility]mic shock ([facility] ICD-10-CM: R57.1 ICD-9-CM: 785.59 Acute 12/3/2020 - Present Frail elderly (Chronic) ICD-10-CM: R54 
ICD-9-CM: 797 Chronic 12/3/2020 - Present Parkinson's disease dementia ([facility] (Chronic) ICD-10-CM: G20, F02.80 ICD-9-CM: 332.0, 294.10 End Stage 12/3/2020 - Present Acute infective tracheobronchitis ICD-10-CM: J20.9 ICD-9-CM: 466.0 Acute 12/2/2020 - Present RESOLVED: COVID-19 virus infection ICD-10-CM: U07.1 ICD-9-CM: 079.89 Acute 12/4/2020 - 12/10/2020 RESOLVED: Acute kidney injury superimposed on CKD ([facility] ICD-10-CM: N17.9, N18.9 ICD-9-CM: 866.00, 585.9 Acute 12/3/2020 - 12/10/2020 RESOLVED: Severe dehydration ICD-10-CM: E86.0 ICD-9-CM: 276.51 Acute 12/3/2020 - 12/10/2020 RESOLVED: Acute bacterial bronchitis ICD-10-CM: J20.8, B96.89 
ICD-9-CM: 466.0, 041.9 Acute 12/2/2020 - 12/10/2020 Kidney disease, chronic, stage IV (severe, EGFR 15-29 ml/min) (HCC) (Chronic) ICD-10-CM: N18.4 ICD-9-CM: 585.4 End Stage 12/3/2020 - Present Anxiety, generalized (Chronic) ICD-10-CM: F41.1 ICD-9-CM: 300.02 Chronic 1/1/2019 - Present Gait abnormality (Chronic) ICD-10-CM: R26.9 ICD-9-CM: 781.2 End Stage 1/1/2016 - Present Memory loss ICD-10-CM: R41.3 ICD-9-CM: 780.93 Acute 1/1/2016 - Present RESOLVED: Dehydration with hypernatremia ICD-10-CM: E87.0 ICD-9-CM: 276.0 Acute 12/3/2020 - 12/10/2020 * (Principal) RESOLVED: Septic shock ([facility] ICD-10-CM: A41.9, R65.21 ICD-9-CM: 038.9, 785.52, 995.92  12/3/2020 - 12/10/2020 Greater than 15  minutes were spent with the patient on counseling and coordination of care Signed:  
Ramiro Teran MD 
12/10/2020 
10:17 AM

## 2020-12-11 PROBLEM — G20 PARKINSON'S DISEASE (HCC): Status: ACTIVE | Noted: 2020-01-01

## 2020-12-11 NOTE — PROGRESS NOTES
[address] [facility] Adult  Hospitalist Group Hospitalist Progress Note Filipe Vicente MD 
Answering service: [phone] OR [phone] from in house phone Date of Service:  2020 NAME:  Dion Dover. :  1955 MRN:  [id number] Admission Summary:  
Patient Peyton Rader is a 77-year-old male admitted to ICU with sepsis/ hypotension/acute respiratory failure.  Carolyn Betancur was a transfer from [facility]. Carolyn Betancur has past medical history for asthma, dementia, musculoskeletal disorder.  Patient presented with life-threatening hypotension and altered mental status from [facility]. Chest ct noticed Left lower lobe consolidation and volume loss. Obstructed left lower lobe 
bronchus Interval history / Subjective:  
 
Seen at bedside 2020 ;  
dispo in making as family /hospice meeting later today Assessment & Plan:  
 
Septic shock - resolved HCAP 
COVID19 Acute hypoxic respiratory failure - slowly improving 
+ Coag neg staph blood cultures - suspect contaminant Afib with RVR - Hypernatremia - SUMEET - Cr on admit 2.9, from normal baseline - improved H/o Parkinsons, Lewy body dementia 
- hold levodopa-carbidopa, patient has not been able to take PO 
- Continue comfort meds: ativan, morphine - DC all abx, IVF, and blood draws per family wishes.  
- Hospice consulted, palliative consulted. Code status: DNR 
DVT prophylaxis: none, comfort Care Plan discussed with: Patient/Family Anticipated Disposition: Home w/Family Anticipated Discharge: Greater than 48 hours Hospice IP vs. SNF Hospital Problems  Date Reviewed: 12/10/2020 Codes Class Noted POA Parkinson's disease, Lewy body ([facility] [address] ICD-10-CM: G31.83 ICD-9-CM: 332.0, 331.82 End Stage 12/3/2020 Yes Hypovolemic shock ([facility] [address] ICD-10-CM: R57.1 ICD-9-CM: 785.59 Acute 12/3/2020 Yes Frail elderly (Chronic) ICD-10-CM: R54 
ICD-9-CM: 487 Chronic 12/3/2020 Yes Parkinson's disease dementia ([facility] [address] (Chronic) ICD-10-CM: G20, F02.80 ICD-9-CM: 332.0, 294.10 End Stage 12/3/2020 Yes Acute infective tracheobronchitis ICD-10-CM: J20.9 ICD-9-CM: 466.0 Acute 12/2/2020 Yes Kidney disease, chronic, stage IV (severe, EGFR 15-29 ml/min) (HCC) (Chronic) ICD-10-CM: N18.4 ICD-9-CM: 585.4 End Stage 12/3/2020 Yes Anxiety, generalized (Chronic) ICD-10-CM: F41.1 ICD-9-CM: 300.02 Chronic 1/1/2019 Yes Gait abnormality (Chronic) ICD-10-CM: R26.9 ICD-9-CM: 781.2 End Stage 1/1/2016 Yes Memory loss ICD-10-CM: R41.3 ICD-9-CM: 780.93 Acute 1/1/2016 Yes Review of Systems:  
Not obtainable 2n2 pt factors Vital Signs:  
 Last 24hrs VS reviewed since prior progress note. Most recent are: 
Visit Vitals /66 (BP 1 Location: Right arm, BP Patient Position: Supine) Pulse 76 Temp 97.4 °F (36.3 °C) Resp 14 Ht 5' 7\" (1.702 m) Wt 74.3 kg (163 lb 11.2 oz) SpO2 92% BMI 25.64 kg/m² Intake/Output Summary (Last 24 hours) at 12/11/2020 0530 Last data filed at 12/10/2020 1908 Gross per 24 hour Intake  Output 2800 ml Net -2800 ml Physical Examination:  
 
I had a face to face encounter with this patient and independently examined them on 12/11/2020 as outlined below: 
 
     
Constitutional:  chronic distress, cachectic, not responding to verbal commands, chronically ill appearing ENT:  Oral mucosa moist, oropharynx benign. Resp:  Course bilaterally. No wheezing/rhonchi/rales. No accessory muscle use CV:  Regular rhythm, normal rate, no murmurs, gallops, rubs GI:  Soft, non distended, non tender. normoactive bowel sounds, no hepatosplenomegaly Musculoskeletal:  No edema, warm, 2+ pulses throughout Neurologic:  Eyes closed, does not respond to verbal commands, withdraws to pain. Skin:  Good turgor, no rashes or ulcers Data Review:  
 Review and/or order of clinical lab test 
Review and/or order of tests in the radiology section of CPT Review and/or order of tests in the medicine section of CPT Labs:  
 
No results for input(s): WBC, HGB, HCT, PLT, HGBEXT, HCTEXT, PLTEXT, HGBEXT, HCTEXT, PLTEXT in the last 72 hours. No results for input(s): NA, K, CL, CO2, BUN, CREA, GLU, CA, MG, PHOS, URICA in the last 72 hours. No results for input(s): ALT, AP, TBIL, TBILI, TP, ALB, GLOB, GGT, AML, LPSE in the last 72 hours. No lab exists for component: SGOT, GPT, AMYP, HLPSE No results for input(s): INR, PTP, APTT, INREXT, INREXT in the last 72 hours. No results for input(s): FE, TIBC, PSAT, FERR in the last 72 hours. No results found for: FOL, RBCF No results for input(s): PH, PCO2, PO2 in the last 72 hours. No results for input(s): CPK, CKNDX, TROIQ in the last 72 hours. No lab exists for component: CPKMB No results found for: CHOL, CHOLX, CHLST, CHOLV, HDL, HDLP, LDL, LDLC, DLDLP, TGLX, TRIGL, TRIGP, CHHD, CHHDX Lab Results Component Value Date/Time Glucose (POC) 115 (H) 12/07/2020 12:33 PM  
 Glucose (POC) 123 (H) 12/07/2020 06:06 AM  
 Glucose (POC) 92 12/07/2020 12:43 AM  
 Glucose (POC) 108 (H) 12/06/2020 05:58 PM  
 Glucose (POC) 106 (H) 12/06/2020 12:16 PM  
 
Lab Results Component Value Date/Time  Color DARK YELLOW 12/03/2020 01:34 AM  
 Appearance CLEAR 12/03/2020 01:34 AM  
 Specific gravity 1.023 12/03/2020 01:34 AM  
 pH (UA) 6.5 12/03/2020 01:34 AM  
 Protein TRACE (A) 12/03/2020 01:34 AM  
 Glucose Negative 12/03/2020 01:34 AM  
 Ketone TRACE (A) 12/03/2020 01:34 AM  
 Bilirubin Negative 12/03/2020 01:34 AM  
 Urobilinogen 1.0 12/03/2020 01:34 AM  
 Nitrites Negative 12/03/2020 01:34 AM  
 Leukocyte Esterase TRACE (A) 12/03/2020 01:34 AM  
 Epithelial cells MODERATE (A) 12/03/2020 01:34 AM  
 Bacteria 1+ (A) 12/03/2020 01:34 AM  
 WBC 5-10 12/03/2020 01:34 AM  
 RBC 5-10 12/03/2020 01:34 AM  
 
 
 
Medications Reviewed:  
 
Current Facility-Administered Medications Medication Dose Route Frequency  sodium chloride (NS) flush 5-40 mL  5-40 mL IntraVENous Q8H  
 sodium chloride (NS) flush 10 mL  10 mL IntraVENous PRN  
 morphine injection 2 mg  2 mg IntraVENous Q15MIN PRN  
 glycopyrrolate (ROBINUL) injection 0.2 mg  0.2 mg IntraVENous Q4H PRN  
 LORazepam (ATIVAN) injection 1 mg  1 mg IntraVENous Q15MIN PRN  
 albuterol (PROVENTIL HFA, VENTOLIN HFA, PROAIR HFA) inhaler 2 Puff  2 Puff Inhalation Q6H RT  
 ipratropium (ATROVENT HFA) 17 mcg inhaler  2 Puff Inhalation Q6H RT  
 [Held by provider] carbidopa-levodopa ER (RYTARY) 36. mg per capsule 1 Cap  1 Cap Oral 5XD  [Held by provider] pramipexole (MIRAPEX) tablet 1.5 mg  1.5 mg Oral QHS  dexamethasone (PF) (DECADRON) 10 mg/mL injection 6 mg  6 mg IntraVENous Q24H  
 ondansetron (ZOFRAN) injection 4 mg  4 mg IntraVENous Q6H PRN  
 
______________________________________________________________________ EXPECTED LENGTH OF STAY: 4d 19h ACTUAL LENGTH OF STAY:          8 Valerie Reynaga MD

## 2020-12-11 NOTE — HOSPICE
[employer] [facility] 
[phone] Patient Name: Samm Marino. YOB: 1955 Age: 76 y.o. [employer] RN Note:  Hospice consult noted. Chart reviewed. Plan of care discussed with patients nurse & care manager. In to meet with patient's wife, Jj, and her sister, Maritza Chapa. Discussed Hospice philosophy, general plan of care, levels of care, services and on call procedures. Family information packet provided & reviewed. Jose Israel, states she is in agreement with hospice philosophy and requests to elect hospice services today. Dr. Gonzalez Laughlin has provided verbal CTI for the DX of Parkinson's primary, and COVID Positive as secondary. Thank you for the opportunity to be of service to this patient. Plan to notify Dr. Governor Diaz, and request a discharge order Dr. Governor Diaz has provided discharge order through [facility]. Bed Management has been notified. Amrik Gottlieb RN, [facility] Hospice Nurse Liaison [phone] [place] [phone] Office

## 2020-12-11 NOTE — PROGRESS NOTES
Bedside shift change report given to [address] (oncoming nurse) by Elias Jama (offgoing nurse). Report included the following information SBAR, Kardex, MAR and Recent Results.

## 2020-12-11 NOTE — PROGRESS NOTES
12/11/20 0820 Vital Signs Temp 98.9 °F (37.2 °C) Temp Source Axillary Pulse (Heart Rate) 77 Heart Rate Source Monitor Resp Rate 12  
O2 Sat (%) 92 % Level of Consciousness (!) Unresponsive /77 MAP (Calculated) 89 BP 1 Method Automatic  
BP 1 Location Right arm BP Patient Position At rest  
MEWS Score 3 Oxygen Therapy O2 Device Room air  
comfort care patient, unresponsive, hospice re-consulted yesterday, will re-assess today.

## 2020-12-11 NOTE — PROGRESS NOTES
Patient resting in bed, family present at bedside. Patient with agonal apneic breathing, appears to be transitioning. Patient to be admitted to inpatient hospice at this time. Problem: Risk for Spread of Infection Goal: Prevent transmission of infectious organism to others Description: Prevent the transmission of infectious organisms to other patients, staff members, and visitors. Outcome: Progressing Towards Goal 
  
Problem: Patient Education:  Go to Education Activity Goal: Patient/Family Education Outcome: Progressing Towards Goal 
  
Problem: Falls - Risk of 
Goal: *Absence of Falls Description: Document Wendy Bark Fall Risk and appropriate interventions in the flowsheet. Outcome: Progressing Towards Goal 
Note: Fall Risk Interventions: 
Mobility Interventions: Communicate number of staff needed for ambulation/transfer, Bed/chair exit alarm Mentation Interventions: Adequate sleep, hydration, pain control, Bed/chair exit alarm Medication Interventions: Evaluate medications/consider consulting pharmacy Elimination Interventions: Toileting schedule/hourly rounds History of Falls Interventions: Bed/chair exit alarm, Evaluate medications/consider consulting pharmacy Problem: Patient Education: Go to Patient Education Activity Goal: Patient/Family Education Outcome: Progressing Towards Goal 
  
Problem: Pressure Injury - Risk of 
Goal: *Prevention of pressure injury Description: Document Sunny Scale and appropriate interventions in the flowsheet. Outcome: Progressing Towards Goal 
Note: Pressure Injury Interventions: 
Sensory Interventions: Float heels, Keep linens dry and wrinkle-free, Minimize linen layers Moisture Interventions: Absorbent underpads, Apply protective barrier, creams and emollients Activity Interventions: Increase time out of bed, Pressure redistribution bed/mattress(bed type) Mobility Interventions: HOB 30 degrees or less, Pressure redistribution bed/mattress (bed type) Nutrition Interventions: Document food/fluid/supplement intake Friction and Shear Interventions: Apply protective barrier, creams and emollients, Lift sheet, Minimize layers Problem: Patient Education: Go to Patient Education Activity Goal: Patient/Family Education Outcome: Progressing Towards Goal 
  
Problem: Breathing Pattern - Ineffective Goal: *Absence of hypoxia Outcome: Progressing Towards Goal 
Goal: *Use of effective breathing techniques Outcome: Progressing Towards Goal 
Goal: *PALLIATIVE CARE:  Alleviation of Dyspnea Outcome: Progressing Towards Goal 
  
Problem: Patient Education: Go to Patient Education Activity Goal: Patient/Family Education Outcome: Progressing Towards Goal 
  
Problem: Breathing Pattern - Ineffective Goal: *Use of effective breathing techniques Outcome: Progressing Towards Goal 
  
Problem: Pain Goal: *Control of acute pain Outcome: Progressing Towards Goal 
  
Problem: Grieving Goal: *Able to express feelings of grief Outcome: Progressing Towards Goal 
Goal: *Able to identify stages of grieving process Outcome: Progressing Towards Goal

## 2020-12-11 NOTE — PROGRESS NOTES
Problem: Risk for Spread of Infection Goal: Prevent transmission of infectious organism to others Description: Prevent the transmission of infectious organisms to other patients, staff members, and visitors. 12/11/2020 1248 by Rose Marie Vargas RN Outcome: Not Progressing Towards Goal 
12/11/2020 1243 by Rose Marie Vargas RN Outcome: Not Progressing Towards Goal 
  
Problem: Patient Education:  Go to Education Activity Goal: Patient/Family Education Outcome: Progressing Towards Goal 
  
Problem: Infection - Risk of, Central Venous Catheter-Associated Bloodstream Infection Goal: *Absence of infection signs and symptoms Outcome: Progressing Towards Goal 
  
Problem: Infection - Risk of, Urinary Catheter-Associated Urinary Tract Infection Goal: *Absence of infection signs and symptoms Outcome: Progressing Towards Goal 
  
Problem: Anxiety Goal: *Alleviation of anxiety Outcome: Not Progressing Towards Goal 
  
Problem: Breathing Pattern - Ineffective Goal: *Use of effective breathing techniques Outcome: Not Progressing Towards Goal 
  
Problem: Pain Goal: *Control of acute pain Outcome: Not Progressing Towards Goal 
  
Problem: Pressure Injury - Risk of 
Goal: *Prevention of pressure injury Outcome: Not Progressing Towards Goal 
  
Problem: Falls - Risk of 
Goal: *Absence of Falls Description: Document [facility] Shavon Fall Risk and appropriate interventions in the flowsheet. Outcome: Progressing Towards Goal 
Note: Fall Risk Interventions:

## 2020-12-11 NOTE — PROGRESS NOTES
12/11/20 0234 Vital Signs Temp 97.4 °F (36.3 °C) Temp Source Axillary Pulse (Heart Rate) 76 Heart Rate Source Monitor Resp Rate 14  
O2 Sat (%) 92 % Level of Consciousness (!) Unresponsive /66 MAP (Calculated) 80 BP 1 Method Automatic  
BP 1 Location Right arm BP Patient Position Supine MEWS Score 3  
comfort care will continue to monitor

## 2020-12-11 NOTE — [facility] IDG SOCIAL WORKER NOTES
Harleen White is a 75 y/o CM with a hospice diagnosis of Parkinsons Disease. Pt has multiple comorbidities including sepsis, COVID 19, SUMEET, and metabolic encephalopathy. As pt is COIVD +, LCSW will not be visiting at bedside. LCSW will provide emotional support and supportive counseling for wife Iona Lubin, son Omar Bauer and family in coping with pts EOL due to PD. 
 
LCSW will provide support to [facility] Lubin and family in coping with strained family dynamics. LCSW will provide grief support for wife Iona Lubin and 21year old son Omar Bauer in processing anticipatory grief and relational loss. LCSW will also refer to pre-beraevement. Moderate risk for bereavement due to pts young age, 22 y/o son having difficulty coping and strained family dynamics. FH to be discussed.

## 2020-12-11 NOTE — PROGRESS NOTES
Patient noted to be having short agonal breaths and grimacing, medicated per order and will continue to monitor

## 2020-12-11 NOTE — PROGRESS NOTES
[address] [facility] Adult  Hospitalist Group Hospitalist Progress Note Summer Linares MD 
Answering service: [phone] OR [phone] from in house phone Date of Service:  12/10/2020- wsa for discharge 12/10, dc note written,discharge held 2n2 dispo plan changes in making. NAME:  Carlos Mak. :  1955 MRN:  [id number] Admission Summary:  
Patient Patria Pineda is a 53-year-old male admitted to ICU with sepsis/ hypotension/acute respiratory failure.  Vidal Belle was a transfer from [facility]. Vidal Belle has past medical history for asthma, dementia, musculoskeletal disorder.  Patient presented with life-threatening hypotension and altered mental status from [facility]. Chest ct noticed Left lower lobe consolidation and volume loss. Obstructed left lower lobe 
bronchus Interval history / Subjective:  
Seen at bedside, Case discussed later day w CM and w Pt's daughter,, discharge plans adjusted . Assessment & Plan:  
 
Septic shock - resolved HCAP 
COVID19 Acute hypoxic respiratory failure - slowly improving 
+ Coag neg staph blood cultures - suspect contaminant Afib with RVR - Hypernatremia - SUMEET - Cr on admit 2.9, from normal baseline - improved H/o Parkinsons, Lewy body dementia 
- hold levodopa-carbidopa, patient has not been able to take PO 
- Continue comfort meds: ativan, morphine - DC all abx, IVF, and blood draws per family wishes.  
- Hospice consulted, palliative consulted. Code status: DNR 
DVT prophylaxis: none, comfort Care Plan discussed with: Patient/Family Anticipated Disposition: Home w/Family Anticipated Discharge: Greater than 48 hours Hospice IP vs. SNF Hospital Problems  Date Reviewed: 12/10/2020 Codes Class Noted POA  Parkinson's disease, Lewy body ([facility] [address] ICD-10-CM: G31.83 
 ICD-9-CM: 332.0, 331.82 End Stage 12/3/2020 Yes Hypovolemic shock ([facility] [address] ICD-10-CM: R57.1 ICD-9-CM: 785.59 Acute 12/3/2020 Yes Frail elderly (Chronic) ICD-10-CM: R54 
ICD-9-CM: 139 Chronic 12/3/2020 Yes Parkinson's disease dementia ([facility] [address] (Chronic) ICD-10-CM: G20, F02.80 ICD-9-CM: 332.0, 294.10 End Stage 12/3/2020 Yes Acute infective tracheobronchitis ICD-10-CM: J20.9 ICD-9-CM: 466.0 Acute 12/2/2020 Yes Kidney disease, chronic, stage IV (severe, EGFR 15-29 ml/min) (HCC) (Chronic) ICD-10-CM: N18.4 ICD-9-CM: 585.4 End Stage 12/3/2020 Yes Anxiety, generalized (Chronic) ICD-10-CM: F41.1 ICD-9-CM: 300.02 Chronic 1/1/2019 Yes Gait abnormality (Chronic) ICD-10-CM: R26.9 ICD-9-CM: 781.2 End Stage 1/1/2016 Yes Memory loss ICD-10-CM: R41.3 ICD-9-CM: 780.93 Acute 1/1/2016 Yes Review of Systems:  
Not obtainable 2n2 pt factors Vital Signs:  
 Last 24hrs VS reviewed since prior progress note. Most recent are: 
Visit Vitals /66 (BP 1 Location: Right arm, BP Patient Position: Supine) Pulse 76 Temp 97.4 °F (36.3 °C) Resp 14 Ht 5' 7\" (1.702 m) Wt 74.3 kg (163 lb 11.2 oz) SpO2 92% BMI 25.64 kg/m² Intake/Output Summary (Last 24 hours) at 12/11/2020 0265 Last data filed at 12/10/2020 1908 Gross per 24 hour Intake  Output 2800 ml Net -2800 ml Physical Examination:  
 
I had a face to face encounter with this patient and independently examined them on 12/11/2020 as outlined below: 
 
     
Constitutional:  chronic distress, cachectic, not responding to verbal commands, chronically ill appearing ENT:  Oral mucosa moist, oropharynx benign. Resp:  Course bilaterally. No wheezing/rhonchi/rales. No accessory muscle use CV:  Regular rhythm, normal rate, no murmurs, gallops, rubs GI:  Soft, non distended, non tender. normoactive bowel sounds, no hepatosplenomegaly Musculoskeletal:  No edema, warm, 2+ pulses throughout Neurologic:  Eyes closed, does not respond to verbal commands, withdraws to pain. Skin:  Good turgor, no rashes or ulcers Data Review:  
 Review and/or order of clinical lab test 
Review and/or order of tests in the radiology section of CPT Review and/or order of tests in the medicine section of CPT Labs:  
 
No results for input(s): WBC, HGB, HCT, PLT, HGBEXT, HCTEXT, PLTEXT, HGBEXT, HCTEXT, PLTEXT in the last 72 hours. No results for input(s): NA, K, CL, CO2, BUN, CREA, GLU, CA, MG, PHOS, URICA in the last 72 hours. No results for input(s): ALT, AP, TBIL, TBILI, TP, ALB, GLOB, GGT, AML, LPSE in the last 72 hours. No lab exists for component: SGOT, GPT, AMYP, HLPSE No results for input(s): INR, PTP, APTT, INREXT, INREXT in the last 72 hours. No results for input(s): FE, TIBC, PSAT, FERR in the last 72 hours. No results found for: FOL, RBCF No results for input(s): PH, PCO2, PO2 in the last 72 hours. No results for input(s): CPK, CKNDX, TROIQ in the last 72 hours. No lab exists for component: CPKMB No results found for: CHOL, CHOLX, CHLST, CHOLV, HDL, HDLP, LDL, LDLC, DLDLP, TGLX, TRIGL, TRIGP, CHHD, CHHDX Lab Results Component Value Date/Time Glucose (POC) 115 (H) 12/07/2020 12:33 PM  
 Glucose (POC) 123 (H) 12/07/2020 06:06 AM  
 Glucose (POC) 92 12/07/2020 12:43 AM  
 Glucose (POC) 108 (H) 12/06/2020 05:58 PM  
 Glucose (POC) 106 (H) 12/06/2020 12:16 PM  
 
Lab Results Component Value Date/Time  Color DARK YELLOW 12/03/2020 01:34 AM  
 Appearance CLEAR 12/03/2020 01:34 AM  
 Specific gravity 1.023 12/03/2020 01:34 AM  
 pH (UA) 6.5 12/03/2020 01:34 AM  
 Protein TRACE (A) 12/03/2020 01:34 AM  
 Glucose Negative 12/03/2020 01:34 AM  
 Ketone TRACE (A) 12/03/2020 01:34 AM  
 Bilirubin Negative 12/03/2020 01:34 AM  
 Urobilinogen 1.0 12/03/2020 01:34 AM  
 Nitrites Negative 12/03/2020 01:34 AM  
 Leukocyte Esterase TRACE (A) 12/03/2020 01:34 AM  
 Epithelial cells MODERATE (A) 12/03/2020 01:34 AM  
 Bacteria 1+ (A) 12/03/2020 01:34 AM  
 WBC 5-10 12/03/2020 01:34 AM  
 RBC 5-10 12/03/2020 01:34 AM  
 
 
 
Medications Reviewed:  
 
Current Facility-Administered Medications Medication Dose Route Frequency  sodium chloride (NS) flush 5-40 mL  5-40 mL IntraVENous Q8H  
 sodium chloride (NS) flush 10 mL  10 mL IntraVENous PRN  
 morphine injection 2 mg  2 mg IntraVENous Q15MIN PRN  
 glycopyrrolate (ROBINUL) injection 0.2 mg  0.2 mg IntraVENous Q4H PRN  
 LORazepam (ATIVAN) injection 1 mg  1 mg IntraVENous Q15MIN PRN  
 albuterol (PROVENTIL HFA, VENTOLIN HFA, PROAIR HFA) inhaler 2 Puff  2 Puff Inhalation Q6H RT  
 ipratropium (ATROVENT HFA) 17 mcg inhaler  2 Puff Inhalation Q6H RT  
 [Held by provider] carbidopa-levodopa ER (RYTARY) 36. mg per capsule 1 Cap  1 Cap Oral 5XD  [Held by provider] pramipexole (MIRAPEX) tablet 1.5 mg  1.5 mg Oral QHS  dexamethasone (PF) (DECADRON) 10 mg/mL injection 6 mg  6 mg IntraVENous Q24H  
 ondansetron (ZOFRAN) injection 4 mg  4 mg IntraVENous Q6H PRN  
 
______________________________________________________________________ EXPECTED LENGTH OF STAY: 4d 19h ACTUAL LENGTH OF STAY:          8 La Nena Minor MD

## 2020-12-11 NOTE — HOSPICE
[address] [facility] Good Help to Those in Need 
[phone] Social Work Admission Note Patient Name: Vanessa Osman. YOB: 1955 Age: 76 y.o. Date of Visit: 12/11/20 Facility of Care: [facility] Patient Room: 215/01 Hospice Attending: Ni Spence MD 
Hospice Diagnosis: Parkinson's disease ([address] Zander Senior Level of Care:  
 [x]  GIP []  Respite 
 []  Routine NARRATIVE Vanessa Osman is a 75 y/o CM with a hospice diagnosis of Parkinsons Disease. Pt has multiple comorbidities including sepsis, COVID 19, SUMEET, and metabolic encephalopathy. As pt is COIVD +, LCSW will not be visiting at bedside. Pt and wife have a 22 y/o son See Kenney (Sloane Mayes). Per discussion with hospice RN there are strained family dynamics between pt, his father Vanessa Osman and pts sister Stefan Edge, both of whom live in [place]. LCSW and Dr. Sujata Bhandari were on the phone via conference call with pts wife, sister Santino Medina, her [address], and pts sister Jovan Muhammad to update re pts status. Wife appeared overwhelmed with the responsibility of having to update pts father and family in [place]. Wife Solomon Meng gave Hospice permission to contact pts father and sister for daily updates re pts clinical status. LCSW will continue to provide emotional support and supportive counseling for wife Solomon Meng, son See Kenney and family in coping with pts EOL due to PD. LCSW later tried to call wife to discuss possible pre-bereavement services for 22 y/o son. LCSW left vm to return my call. Per hospice RN, pts son is Milagros Mccallumath a hard time\". Son is visiting the room. LCSW will notify place a request for pre-bereaevment. Moderate risk for bereavement due to pts young age, 22 y/o son having difficulty coping and strained family dynamics. LCSW will continue to assess and monitor pt and family needs. ADVANCE CARE PLANNING Code Status: DNR Durable DNR: X_ Yes  _ No completed upon admission Advance Care Planning 12/3/2020 Patient's Healthcare Decision Maker is: Legal Next of Kin Confirm Advance Directive None Relationship Status: 
[]  Single    
[x]       
[]     
[]  Domestic Partner    
[]  / 
[]  Common Law 
[]   
[]  Unknown If in a relationship, name of partner/spouse: Carlos Davis Duration of relationship: 
 
Worship: Sikh  Home: TBD, will discuss with wife Resources Provided: Will send referral to pre-bereavement for 20 y/o son. Social Work Initial Assessment Gender: 
male Race/Ethnicity: (mario all that apply) []  American [place] ([facility] or [place] Native 
[]   
[]  Black or [place] American 
[]   or  
[]   or [place] 
[x]  Michelle Sage 
[]  Unknown 
  
 Service:   
[]  Yes  
[]  No      
[x]  Unknown Appropriate for Pinning Ceremony:  
[]  Yes     
[]  No 
Is patient using VA benefits? []  Yes     
[]  No 
  
Primary Language: English  
[]   Needed 
[]   utilized during visit Ability to express thoughts/needs/feelings 
[]  Expressed thoughts/feelings/needs without difficulty 
[]  Requires extra time and cuing 
[]  Speech limited single words 
[]  Uses only gestures (eye, blinking eye or head movement/pointing) []  Unable to express thoughts/feelings/needs (speech unintelligible or inappropriate) [x]  Unresponsive Notes:  
  
Mental Status: 
[]  Alert-oriented to:   
 []  Person   
 []  Place   
 []  Time 
[]  Comatose-responds to:  
 []   Verbal stimuli  
 []  Tactile stimuli  
 []  Painful stimuli 
[]  Forgetful 
[]  Disoriented/Confused 
[]  Lethargic 
[]  Agitated 
[x]  Other (specify): Hayden Klein  
Notes:  
  
Patients description of Illness/Current Health Status:   
[x]  Patient unable to discuss, Unresponsive,  
[]  Patient unwilling to discuss 
[]  (Specify) Knowledge/Understanding of Disease Process Patient:  
 []  Demonstrates knowledge/understanding of disease process 
 []  Demonstrates knowledge/understanding of treatment plan 
 []  Demonstrates knowledge/understanding of prognosis []  Demonstrates acceptance of prognosis []  Demonstrates knowledge/understanding of resuscitation status [x]  Other (specify) Caregiver: 
 [x]  Demonstrates knowledge/understanding of disease process [x]  Demonstrates knowledge/understanding of treatment plan 
 [x]  Demonstrates knowledge/understanding of prognosis [x]  Demonstrates acceptance of prognosis [x]  Demonstrates knowledge/understanding of resuscitation status 
 []  Other (specify) Notes:  
  
Patients living arrangement/care setting: 
Use the PRIOR COLUMN when the PATIENTS current health status necessitated a change in his/her primary residence. Prior Current Response  
           []             []    Patients own home/residence []             []    Home of family member/friend []             []    Boarding home  
           []             []    Assisted living facility/penitentiary center []             []    Hospital/Acute care facility []             []    Skilled nursing facility [x]             []    Long term care facility/Nursing home  
           []             [x]    Hospice in Patient Primary Caregiver: 
[]  No Primary Caregiver Name of Primary Caregiver: Carlee Vásquez Relationship or Primary Caregiver:  
 [x]  Spouse/Significant other     
 []  Natural Child      
 []  Step child     
 []  Sibling 
 []  Parent 
 []  Friend/Neighbor 
 []  Community/Quaker Volunteer 
 []  Paid help 
 []  Other (specify):___________ Notes:   
  
Family members/Significant others: 
Name: Carlee Vásquez Relationship: wife Phone Number: [phone] Actively involved in care? [x]  Yes  []  No 
 
Name:Mihai Ayla Sheikh Relationship: 22 y/o son Phone Number: Actively involved in care?   [x]  Yes  []  No 
 
 Name: Martina Diaz Relationship: CARLIN, pts wife's sister Phone [phone] Actively involved in care? [x]  Yes  []  No 
 
Social support systems: (select ONE best description) [x]  Excellent social support system which includes three or more family members or friends 
[]  Good social support system which includes two or less members or friends 
[]  [address] support which includes one family member or friend 
[]  Poor social support; no family members or friends; basically ALONE Notes:  
  
Emotional Status: (mario all that apply) Patient Caregiver Response [x]                [x]    Mood/Affect stable and appropriate    
              []                []    Angry  
              []                []    Anxious []                []    Apprehensive []                []    Avoidant  
              []                []    Clinging  
              []                []    Depressed  
              []                []    Distraught  
              []                []    Elated []                []    Euphoric  
              []                []    Fearful  
              []                []    Flat Affect  
              []                []    Helpless []                []    Hostile []                []    Impulsive []                []    Irritable  
              []                []    Labile  
              []                []    Manic  
              []                []    Restlessness []                [x]    Sad  
              []                []    Suspicious []                []    Tearful  
              []                []    Withdrawn Notes:  
 
Coping Skills (strengths/weakness):  
 Patient: Coping Skills (strength/weakness): Unresponsive Family/caregiver (strength/weakness): Well supported by her family, pt has a dx of early dementia, relational strain between pt and his father and sister who live in [place]. 
  
[place] of care (mario all that apply):    
[]  No burden evident  
[]  Family must administer medications  
[]  Illness causing financial strain  
[x]  Family/Support feels overwhelmed  
[]  Family/Support sleep disturbed with patients care  
[]  Patients care causes extra physical stress  of death 
[]  Illness causes changes in family lifestyle 
[]  Illness impacting family/support employment 
[]  Family experiencing increased time demands 
[]  Patients behavior endangers family 
[]  Denial of patients illness 
[]  Concern over outcome of illness/fear 
[]  Patients behavior embarrassing to family Notes:  
  
Risk Factors: (mario all that apply):   
[]  No burden evident  
[]  Alcohol abuse 
[]  Financial resources inadequate to meet basic needs (food/house/etc) []  Financial resources inadequate to meet health care needs (supplies/equipment/medications) 
[]  Food/nutrition resources inadequate 
[]  Home environment unsafe/inadequate for home care 
[]  Homicidal risk 
[]  Lives alone or without concerned relatives 
[]  Multiple medications/complex schedule 
[]  Physical limitations increase likelihood of falls 
[]  Plan of care/treatments complicated 
[]  Substance use/abuse 
[]  Suicidal risk 
[]  Visual impairment threatens safety/ability to perform self-care 
[]  Other (specify): 
  
Abuse/Neglect (actual/potential risks): 
[]  No signs of abuse/neglect 
[]  History of abuse/neglect                 []  [id number]          []  Sexual 
[]  History of domestic violence 
[]  Lacks adequate physical care 
[]  Lacks emotional nurturing/support 
[]  Lacks appropriate stimulation/cognitive experiences 
[]  Left alone inappropriately 
[]  Lacks necessary supervision 
[]  Inadequate or delayed medical care []  Unsafe environment (i.e guns/drug use/history of violence in the home/etc.) []  Bruising or other physical signs of injury present 
[]  Other (specify): 
Notes:  
[]  Refer to child/adult protective services Current Sources of Stress (in Addition to Current Illness):  
[]  None reported 
[]  Bills/Debt   
[]  Career/Job change   
[]   (short term)   
[]   (long term)   
[]  Death of a child (recent)   
[]  Death of a parent (recent)  
[]  Death of a spouse (recent)  
[]  Employment status changed  
[x]  Family discord   
[]  Financial loss/Inadequate inther (specify):come 
[]  Job loss 
[]  Legal issues unresolved 
[]  Lifestyle change 
[]  Marital discord 
[]  Marriage within the last year 
[]  Paperwork (insurance/legal/etc) overwhelming 
[]  Separation/Divorce 
[]  Other (specify): 
Notes:  
  
Current Community Resources Being Utilized 1. Interventions/Plan of Care 1. Assess social and emotional factors related to coping with end of life issues 2. Community resource planning/referral  
3. Relocation to different care setting if/when symptoms stabilize 4. Discharge Planning 1. Will return to [address] [facility] if stabilizes. MSW Assessment Completed by: Tay Lao 12/11/20 Time In: 2; 00 pm      
Time Out: 3:00 pm

## 2020-12-11 NOTE — HOSPICE
[facility] Good Help to Those in Need 
[phone] Inpatient Nursing Admission Patient Name: Vanessa Osman. YOB: 1955 Age: 76 y.o. Date of Hospice Admission: 12/11/2020 Hospice Attending Elected by Patient: Ni Spence MD 
Primary Care Physician: Naveed Clay MD 
Admitting RN: Maurizio Bay RN, [facility] : Sabine Swanson LCSW virtually Level of Care (GIP/Routine/Respite): [facility] Facility of Care: [address] [place] [address] Patient Room: 215/01 HOSPICE SUMMARY  
ER Visits/ Hospitalizations in past year: 1 documented through [address] [place] [address] Hospice Diagnosis: Parkinson's disease ([facility] [address] Ethelyn Senior Onset Date of Hospice Diagnosis: 1 month Summary of Disease Progression Leading to Hospice Diagnosis:  
 From H&P note: \"HPI: Patient Jo Ann Wilks is a 29-year-old male seen and examined today by critical care services due to initial complaints of hypotension. Patient was a transfer from [facility]. Patient has past medical history for asthma, dementia, musculoskeletal disorder. Patient presented with life-threatening hypotension and altered mental status from [facility]. Systolic blood pressure was in the 80s even with IV fluid resuscitation at 3 L. Patient had left central line placed for better IV access to right internal jugular with no complications. Chest x-ray shows adequate placement. No pneumothorax. Patient has life-threatening septic shock secondary to pneumonia. Patient was previously diagnosed with COVID-19 pneumonia on November 18. Current PCR pending. Patient started on vancomycin and Zosyn for IV antibiotic therapy. WBCs 12,000. Cardiac enzymes negative. Patient started on lactated Ringer's at 150 cc/h. Patient given additional liter bolus. Patient was hypokalemic with potassium 3.1. IV replacement given. Patient is hyponatremic with sodium 156. Patient will have NG tube placed and will start her on free water flushes at 300 cc every 3 hours. Dietary consult in place for management of tube feedings. Procalcitonin 0.65. . ER physician spoke with family regarding CODE STATUS the patient is a DNI. Family does want patient to receive CPR. Would recommend further conversation with the family regarding goals of care. Plan is to continue ICU support.  
Past medical history: Asthma, dementia, musculoskeletal disorder Past surgical history: Noncontributory Past family history: Dementia, heart disease, cancer, CVA Past social history: Patient is from [facility]. Patient is  and wife is POA.   Non-smoker, no EtOH use 
  
Past Medical History:  
  
 past medical history of Asthma, Dementia ([facility] [address] Musculoskeletal disorder, and Stool color black. 
  
 
Co-Morbidities:  
Patient Active Problem List  
Diagnosis Code  Kidney disease, chronic, stage IV (severe, EGFR 15-29 ml/min) ([facility]) N18.4  Parkinson's disease, Lewy body ([facility] [address] G31.83  
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 is a 76 y.o. who was admitted to [facility]. The patient's principle diagnosis of  Parkinsons has resulted in AMS, COVID 19, unable to tolerate PO/SL medications due to decline symptoms. Functionally, the patient's Palliative Performance Scale has declined over a period of 1 month and is estimated at 10. Objective information that support this patients limited prognosis includes:   
 
Results for Blanka Oliva (MRN [id number]) as of 12/11/2020 13:24 Ref. Range 12/7/2020 01:02 Sodium Latest Ref Range: 136 - 145 mmol/L 141 Potassium Latest Ref Range: 3.5 - 5.1 mmol/L 3.4 (L) Chloride Latest Ref Range: 97 - 108 mmol/L 109 (H) CO2 Latest Ref Range: 21 - 32 mmol/L 25 Anion gap Latest Ref Range: 5 - 15 mmol/L 7 Glucose Latest Ref Range: 65 - 100 mg/dL 98 BUN Latest Ref Range: 6 - 20 MG/DL 22 (H) Creatinine Latest Ref Range: 0.70 - 1.30 MG/DL 0.63 (L) BUN/Creatinine ratio Latest Ref Range: 12 - 20   35 (H) Calcium Latest Ref Range: 8.5 - 10.1 MG/DL 8.0 (L) GFR est non-AA Latest Ref Range: >60 ml/min/1.73m2 >60  
 GFR est AA Latest Ref Range: >60 ml/min/1.73m2 >60 Results for Adeel Martins (MRN [id number]) as of 12/11/2020 13:24 Ref. Range 12/6/2020 01:06 WBC Latest Ref Range: 4.1 - 11.1 K/uL 12.4 (H) NRBC Latest Ref Range: 0  WBC 0.0  
RBC Latest Ref Range: 4.10 - 5.70 M/uL 3.61 (L) HGB Latest Ref Range: 12.1 - 17.0 g/dL 11.2 (L) HCT Latest Ref Range: 36.6 - 50.3 % 33.8 (L) MCV Latest Ref Range: 80.0 - 99.0 FL 93.6 MCH Latest Ref Range: 26.0 - 34.0 PG 31.0 MCHC Latest Ref Range: 30.0 - 36.5 g/dL 33.1 RDW Latest Ref Range: 11.5 - 14.5 % 12.6 PLATELET Latest Ref Range: 150 - 400 K/uL 148 (L) MPV Latest Ref Range: 8.9 - 12.9 FL 10.7 NEUTROPHILS Latest Ref Range: 32 - 75 % 76 (H) LYMPHOCYTES Latest Ref Range: 12 - 49 % 13 MONOCYTES Latest Ref Range: 5 - 13 % 7 EOSINOPHILS Latest Ref Range: 0 - 7 % 3  
BASOPHILS Latest Ref Range: 0 - 1 % 0 IMMATURE GRANULOCYTES Latest Ref Range: 0.0 - 0.5 % 1 (H) DF Latest Units:   AUTOMATED ABSOLUTE NRBC Latest Ref Range: 0.00 - 0.01 K/uL 0.00  
ABS. NEUTROPHILS Latest Ref Range: 1.8 - 8.0 K/UL 9.4 (H)  
ABS. IMM. GRANS. Latest Ref Range: 0.00 - 0.04 K/UL 0.1 (H)  
ABS. LYMPHOCYTES Latest Ref Range: 0.8 - 3.5 K/UL 1.7  
ABS. MONOCYTES Latest Ref Range: 0.0 - 1.0 K/UL 0.8 ABS. EOSINOPHILS Latest Ref Range: 0.0 - 0.4 K/UL 0.4  
ABS. BASOPHILS Latest Ref Range: 0.0 - 0.1 K/UL 0.0 Results for Adeel Martins (MRN [id number]) as of 12/11/2020 13:24 Ref. Range 12/6/2020 01:06 Sodium Latest Ref Range: 136 - 145 mmol/L 146 (H) Potassium Latest Ref Range: 3.5 - 5.1 mmol/L 3.5 Chloride Latest Ref Range: 97 - 108 mmol/L 114 (H) CO2 Latest Ref Range: 21 - 32 mmol/L 25 Anion gap Latest Ref Range: 5 - 15 mmol/L 7 Glucose Latest Ref Range: 65 - 100 mg/dL 96 BUN Latest Ref Range: 6 - 20 MG/DL 20 Creatinine Latest Ref Range: 0.70 - 1.30 MG/DL 0.76  
BUN/Creatinine ratio Latest Ref Range: 12 - 20   26 (H) Calcium Latest Ref Range: 8.5 - 10.1 MG/DL 8.4 (L) Phosphorus Latest Ref Range: 2.6 - 4.7 MG/DL 2.6 Magnesium Latest Ref Range: 1.6 - 2.4 mg/dL 2.1 GFR est non-AA Latest Ref Range: >60 ml/min/1.73m2 >60  
GFR est AA Latest Ref Range: >60 ml/min/1.73m2 >60 Albumin Latest Ref Range: 3.5 - 5.0 g/dL 3.4 (L) Lactic acid Latest Ref Range: 0.4 - 2.0 MMOL/L 1.0 INDICATION:    COVID, eval PNA evolution EXAMINATION:  AP CHEST, PORTABLE  
COMPARISON: 12/4/2020 FINDINGS: Single AP portable view of the chest at 1035 hours demonstrates no 
change in the right IJ central venous catheter. The cardiomediastinal silhouette 
is stable. There is increasing retrocardiac opacification and mild worsening 
airspace disease throughout the left mid and lower lung. IMPRESSION:  
Worsening airspace disease throughout the left mid and lower lung. 
  
CT of Chest: 
IMPRESSION: 
Left lower lobe consolidation and volume loss. Obstructed left lower lobe 
bronchus. Findings are concerning for mucous plug The patient/family chose comfort measures with the support of Hospice. CT HEAD WO CONT; The ventricles and sulci are enlarged. There is no significant white matter 
disease. There is no intracranial hemorrhage, extra-axial collection, or mass 
effect. The basilar cisterns are open. No CT evidence of acute infarct The bone windows demonstrate no abnormalities. The visualized portions of the 
paranasal sinuses and mastoid air cells are clear. Patient meets for GIP LOC as evidenced by increased symptoms of pain, agitation and respiratory distress. Pt unable to tolerate PO/SL route dosing, requiring IV route. Prognosis estimated based on 12/11/20 clinical assessment is:  
 
[x] Hours to Days ASSESSMENT Patient self-reports:  []  Yes    [x] No 
 
SYMPTOMS: Patient appears restless, agitated with movement and care. Respirations are rapid, and shallow. SIGNS/PHYSICAL FINDINGS: Pt brow is furrowed, moaning at times. KARNOFSKY: 10 
 
FAST for all dementia:   
 
Learning Assessment: 
Patient Is patient willing/able to learn? NO Caregiver Is caregiver willing to learn care for patient? Yes, while bedside CLINICAL INFORMATION Wt Readings from Last 3 Encounters:  
12/11/20 74.3 kg (163 lb 12.8 oz) 12/08/20 74.3 kg (163 lb 11.2 oz) 08/27/20 70.8 kg (156 lb) Ht Readings from Last 3 Encounters:  
12/11/20 5' 7\" (1.702 m)  
12/03/20 5' 7\" (1.702 m) 08/27/20 5' 11\" (1.803 m) Body mass index is 25.66 kg/m². Visit Vitals Ht 5' 7\" (1.702 m) Wt 74.3 kg (163 lb 12.8 oz) BMI 25.66 kg/m² LAB VALUES No results found for this visit on 12/11/20 (from the past 12 hour(s)). No results found for this visit on 12/11/20 (from the past 6 hour(s)). Lab Results Component Value Date/Time Protein, total 6.6 12/03/2020 01:34 AM  
 Albumin 3.1 (L) 12/07/2020 12:56 AM  
 
 
Currently this patient has: 
[] Supplemental O2 [] Peripheral IV  [] PICC    [] PORT [x] Cortez Catheter [] NG Tube   [] PEG Tube [] Ostomy   
[] AICD: Has ICD been deactivated? [] Yes [x] Central Line:______ PLAN Plan of Care: 1. Education of patient and family regarding end of life care and Hospice plan of care 2. Provide education and support to unit staff caring for hospice patient and family. Provide staff with direct contact information to reach hospice team [phone] 2. Provide support and frequent rounds for patient comfort and safety ongoing 3. Provide  support ongoing, continue to discuss discharge plan if patient becomes osmar and does not require acute nursing care interventions for GIP level of care 4. Provide  and bereavement support ongoing. Hospice Chaplain is working with family to set up meeting today for support 3. Continue with IV symptom medications and assess comfort 4. Schedule Morphine 2mg IV Q 4 hours with IV 1mg Lorazepam Q 4. 
5. Add PRN dosing of lorazepam, IV, Morphine IV. PRN dosing for other symptoms related to comfort. 6. Maintain skin integrity as tolerated for hospice patient, turning and repositioning for comfort, and specialty mattress if appropriate 7. Cortez care per hospital policy for infection prevention 8. Central line care as per hospital policy for infection prevention 9. Offer bedside support as well as telephone updates for family out of town, as well as local [place], pt wife. Hospice Team Frequency Orders: 
Skilled Nurse -   Daily x 7 days /every other day x 7 days  with 5 PRN visits for symptom control. MSJAILYN  1 visit for initial assessment/evaluation for family support and need for volunteer services. Marylou Cushing  1 visit for initial assessment/evaluation for spiritual support. ADVANCE CARE PLANNING (Complete in ACP Flow Sheet) Code Status: DNR Durable DNR: [x]  Yes  []  No 
Code Status Discussed/Confirmed: 
Preference for Other Life Sustaining Treatment Discussed/Confirmed:Yes Hospitalization Preference: [facility] Advance Care Planning 12/3/2020 Patient's Healthcare Decision Maker is: Legal Next of Kin Confirm Advance Directive None Advanced Directives are confirmed and are scanned into CC.  Service: [] Yes  [x]  No      [] Unknown Appropriate for Pinning Ceremony:  [] Yes     [x] No 
Mu-ism: Judaism  Home: [facility] [address] DISCHARGE PLANNING 1. Discharge Plan: If patient stabilizes and is safe for transport, patient will return to [facility] with hospice care. 2. Patient/Family teaching: Pt wife, Azalea Sharif and Vika's sister, Naz Goyal available bedside. 3. Response to patient/family teaching: Azalea Sharif states she is in agreement with hospice plan of care SOCIAL/EMOTIONAL/SPIRITUAL NEEDS  
 
 Spiritual Issues Identified: None. Family feel supported in their [place]. Pt received Anointing of the Sick last week here at [facility]. Psych/ Social/ Emotional Issues Identified: Multiple family concerns. See assessment note from hospice social worker, Beatriz Lee LCSW. Family discussion with Dr. Shelly Bennett, and family members from out of town. Tiffany Jose and Mikaylamarko Villagran present at [facility] during this discussion,. Caregiver Support: 
[x] Provided information on End of Life Care  
[x] Material Provided: Rell Ridley From [employer] or [employer]  
 
CARE COORDINATION Dr. Maureen Mcelroy contacted, discharge to hospice order received Dr. Remington Thurston contacted, agrees to serve as attending provider for hospice and provided verbal certification of terminal illness with life expectancy of 6 months or less. Orders for hospice admission, medications and plan of treatment received. Medication reconciliation completed. MEDS: See medication list below DME: Per hospital 
Supplies: Per hospital 
IDT communication to include MD, SN, SW, CH and support team 
 
ALLERGIES AND MEDICATIONS Allergies: Allergies Allergen Reactions  Alprazolam Rash Current Facility-Administered Medications Medication Dose Route Frequency  LORazepam (ATIVAN) injection 1 mg  1 mg IntraVENous Q15MIN PRN  
 acetaminophen (TYLENOL) suppository 650 mg  650 mg Rectal Q4H PRN  
 ketorolac (TORADOL) injection 15 mg  15 mg IntraVENous Q8H PRN  
 glycopyrrolate (ROBINUL) injection 0.2 mg  0.2 mg IntraVENous Q4H PRN  
 bisacodyL (DULCOLAX) suppository 10 mg  10 mg Rectal DAILY PRN  
 morphine injection 2 mg  2 mg IntraVENous Q15MIN PRN  
 sodium chloride (NS) flush 5 mL  5 mL InterCATHeter Q24H  
 morphine injection 2 mg  2 mg IntraVENous Q4H  
 LORazepam (ATIVAN) injection 1 mg  1 mg IntraVENous Q4H  
 [START ON 12/12/2020] dexamethasone (DECADRON) 4 mg/mL injection 2 mg  2 mg IntraVENous ONCE Austin Gutiérrez RN, [facility] Hospice Nurse Liaison [phone] [place] [phone] Office

## 2020-12-11 NOTE — PROGRESS NOTES
Problem: Risk for Spread of Infection Goal: Prevent transmission of infectious organism to others Description: Prevent the transmission of infectious organisms to other patients, staff members, and visitors.  
Outcome: Not Progressing Towards Goal

## 2020-12-11 NOTE — PROGRESS NOTES
Bedside and Verbal shift change report given to Soto Metzger (oncoming nurse) by Robby Trinidad (offgoing nurse). Report included the following information SBAR, Kardex, MAR and Recent Results.

## 2020-12-12 NOTE — H&P
[address] [place] [address] [facility] to Those in Need 
[phone] Patient Name: Kelsea Gutierrez. YOB: 1955 Date of Provider Hospice Visit: 12/12/20 Level of Care:   [x] General Inpatient (GIP)    [] Routine   [] Respite Current Location of Care: 
[x] [facility] [] [facility] [] [id number] [address] [facility] [] [facility] [] Hospice [facility], patient referred from: 
[] [facility] [] [facility] [] [id number] [address] [] [facility] [] Home [] Other:  
 
Date of Original Hospice Admission: 12/11/2020 Hospice Medical Director at time of admission: Roise Lanes, MD 
 
Principle Hospice Diagnosis: Parkinson's disease Diagnoses RELATED to the terminal prognosis:  
COVID-19 Other Diagnoses: Corey Light Kelsea Gutierrez. is a 76y.o. year old who was admitted to [address] [place] [address]. He has Parkinson's disease and was a resident of [facility]. He was admitted to [facility] on 12/3/2020 with AHRF and sepsis. CXR demonstrated airspace disease of the left lung. He was found to be positive for COVID-19. He did not recover despite treatment. He was unable to take his chronic Parkinson's medications d/t inability to tolerate oral intake. The family chose to pursue comfort measures with the support of Hospice. He is being admitted to inpatient hospice care on 12/11 for worsening pain and restlessness. On my 12/11 exam patient is unresponsive with anticipated prognosis of hours to days. Objective information:  
12/6/2020 CXR: 
IMPRESSION:  
Worsening airspace disease throughout the left mid and lower lung. HOSPICE ASSESSMENT Active Symptoms and Issues: 
-Generalized pain 
-Anxiety/agitation/restlessness 
-Irregular/breathing /periods of apnea  
-Dysphagia 
-Unresponsiveness 
-Hospice care PLAN 1. GIP level of care needed for symptoms necessitating frequent skilled nursing assessment and administration of parenteral medications. Needs monitoring for need to titrate sx mgt regimen for optimization of comfort. 2. Pt is at high risk of rapid decline and death due to terminal disease process. 3. For pain and dyspnea control, he is on scheduled morphine 2 mg IV every 4 hours routinely and prn pain, air hunger, not used any prn today . 4. For dyspnea: weaned off dexamethasone. 5. For restlessness, on scheduled ativan 1 mg IV every 4 hours routinely and prn anxiety, agitation, restlessness 6. We will have glycopyrrolate 0.2 mg IV available prn to help prevent formation of new secretions. Recommend  Gentle anterior suction of secretions okay (i.e. suction around lips/teeth). Recommend avoiding deep suction to prevent irritation, pain, bleeding. 7. Prn bisacodyl for constipation 8. Prn tylenol and/or toradol for fever. 9.  Psychosocial ; met with patient wife Kalen Eubanks out side patient room , she notes patient is comfortable , understand \" he is naturally shutting down \" Elvin Cummings family on nonverbal signs of pain and restlessness, reviewed current vital signs with her , she is tearful , accepting he is dying , she and her son are here every day in afternoon , she wants to make sure she is notified for nay change in patient condition . She has let me know , she has spoken to [facility]  this morning regarding bereavement support for family . 10. Will monitor sxs and prn use for need to schedule/titrate medications further 11.  and SW to support family needs. 12. Disposition: anticipate that pt will decline and die in the coming hours-days without stabilizing enough for care in the home setting 13. Hospice plan of care discussed with hospice idt, patient's wife, sister-in-law, sister, and father. 14. Prognosis estimated based on today's clinical assessment is  
[x] Hours to Days   
[] Days to Weeks   
[] Other: 
 
Plan of care communicated with bed side Rn and hospice Rn Marycarmen Mistry. GOALS OF CARE Patient/Medical POA stated Goal of Care: optimize patient comfort [] I have reviewed and/or updated ACP information in the Advance Care Planning Navigator. This information is available in the [address] link in the patient's chart header. Primary Medical Decision Maker:   Primary Decision Maker: Vika Talamantes - Spouse - [phone] Resuscitation Status: DNR If DNR is there a Durable DNR on file? : [] Yes [] No (If no, complete Durable DNR) HISTORY History obtained from: chart review, hospice idt, patient's family CHIEF COMPLAINT: patient cannot give as unresponsive The patient is:  
[] Verbal 
[] Nonverbal 
[x] Unresponsive HPI/SUBJECTIVE:   
12/11: patient unresponsive. Multiple prns morphine and ativan needed today for pain, dyspnea, restlessness. Wife's goal is for comfort with hospice. 12/12; patient remains unresponsive . REVIEW OF SYSTEMS The following systems were: [] reviewed  [x] unable to be reviewed as pt is unresponsive Positive ROS include bold items: 
Constitutional: fatigue, weakness, in pain, short of breath Ears/nose/mouth/throat: increased airway secretions Respiratory:shortness of breath, wheezing Gastrointestinal:poor appetite, nausea, vomiting, abdominal pain, constipation, diarrhea Musculoskeletal:pain, deformities, swelling legs Neurologic:confusion, hallucinations, weakness Psychiatric:anxiety, feeling depressed, poor sleep Endocrine: increased thirst, increased hunger Adult Non-Verbal Pain Assessment Score: 1 (after receiving medication) Face [x] 0   No particular expression or smile 
[] 1   Occasional grimace, tearing, frowning, wrinkled forehead 
[] 2   Frequent grimace, tearing, frowning, wrinkled forehead Activity (movement) [x] 0   Lying quietly, normal position 
[] 1   Seeking attention through movement or slow, cautious movement 
[] 2   Restless, excessive activity and/or withdrawal reflexes Guarding 
[x] 0   Lying quietly, no positioning of hands over areas of body [] 1   Splinting areas of the body, tense 
[] 2   Rigid, stiff Physiology (vital signs) [x] 0   Stable vital signs [] 1   Change in any of the following: SBP > 20mm Hg; HR > 20/minute 
[] 2   Change in any of the following: SBP > 30mm Hg; HR > 25/minute Respiratory [x] 0   Baseline RR/SpO2, compliant with ventilator 
[] 1   RR > 10 above baseline, or 5% drop SpO2, mild asynchrony with ventilator 
[] 2   RR > 20 above baseline, or 10% drop SpO2, asynchrony with ventilator FUNCTIONAL ASSESSMENT Palliative Performance Scale (PPS): 10 PSYCHOSOCIAL/SPIRITUAL ASSESSMENT Active Problems: 
  Parkinson's disease ([facility] (12/11/2020) Past Medical History:  
Diagnosis Date  Asthma  [facility] lewy body dementia  Musculoskeletal disorder  Stool color black Past Surgical History:  
Procedure Laterality Date  HX OTHER SURGICAL  2012  
 knee  HX OTHER SURGICAL  2015  
 neck Social History Tobacco Use  Smoking status: Never Smoker  Smokeless tobacco: Never Used Substance Use Topics  Alcohol use: Not Currently Frequency: Never Family History Problem Relation Age of Onset  Dementia Mother  Heart Disease Mother  Cancer Father  Stroke Brother  Stroke Other Allergies Allergen Reactions  Alprazolam Rash Current Facility-Administered Medications Medication Dose Route Frequency  LORazepam (ATIVAN) injection 1 mg  1 mg IntraVENous Q15MIN PRN  
 acetaminophen (TYLENOL) suppository 650 mg  650 mg Rectal Q4H PRN  
 ketorolac (TORADOL) injection 15 mg  15 mg IntraVENous Q8H PRN  
 glycopyrrolate (ROBINUL) injection 0.2 mg  0.2 mg IntraVENous Q4H PRN  
 bisacodyL (DULCOLAX) suppository 10 mg  10 mg Rectal DAILY PRN  
 morphine injection 2 mg  2 mg IntraVENous Q15MIN PRN  
 sodium chloride (NS) flush 5 mL  5 mL InterCATHeter Q24H  
 morphine injection 2 mg  2 mg IntraVENous Q4H  
  LORazepam (ATIVAN) injection 1 mg  1 mg IntraVENous Q4H PHYSICAL EXAM  
 
Wt Readings from Last 3 Encounters:  
12/11/20 163 lb 12.8 oz (74.3 kg) 12/08/20 163 lb 11.2 oz (74.3 kg) 08/27/20 156 lb (70.8 kg) Visit Vitals /81 (BP 1 Location: Right arm, BP Patient Position: At rest) Pulse 90 Temp 98 °F (36.7 °C) Resp 14 Ht 5' 7\" (1.702 m) Wt 163 lb 12.8 oz (74.3 kg) SpO2 92% BMI 25.66 kg/m² Supplemental O2  [] Yes  [x] NO Last bowel movement:  
 
Currently this patient has: 
[] Peripheral IV [] PICC  [] PORT [] ICD [] Cortez Catheter [] NG Tube   [] PEG Tube   
[] Rectal Tube [] Drain 
[] Other:  
 
  12/12 : In order to conserve PPE and prevent spread of infection, did not examine the patient . Per bed side Rn and hospice RN evaluation , patient is comfortable , with brief periods of apnea. Pertinent Lab and or Imaging Tests: 
Lab Results Component Value Date/Time Sodium 141 12/07/2020 01:02 AM  
 Potassium 3.4 (L) 12/07/2020 01:02 AM  
 Chloride 109 (H) 12/07/2020 01:02 AM  
 CO2 25 12/07/2020 01:02 AM  
 Anion gap 7 12/07/2020 01:02 AM  
 Glucose 98 12/07/2020 01:02 AM  
 BUN 22 (H) 12/07/2020 01:02 AM  
 Creatinine 0.63 (L) 12/07/2020 01:02 AM  
 BUN/Creatinine ratio 35 (H) 12/07/2020 01:02 AM  
 GFR est AA >60 12/07/2020 01:02 AM  
 GFR est non-AA >60 12/07/2020 01:02 AM  
 Calcium 8.0 (L) 12/07/2020 01:02 AM  
 
Lab Results Component Value Date/Time Protein, total 6.6 12/03/2020 01:34 AM  
 Albumin 3.1 (L) 12/07/2020 12:56 AM  
 
   
 
Dm Coe MD 
[facility]

## 2020-12-12 NOTE — PROGRESS NOTES
Bedside and Verbal shift change report given to Krista Ngo (oncoming nurse) by Audrey Gale RN (offgoing nurse). Report included the following information SBAR, Kardex, Intake/Output and MAR.

## 2020-12-12 NOTE — PROGRESS NOTES
Problem: Anxiety Goal: *Alleviation of anxiety Outcome: Progressing Towards Goal 
  
Problem: Breathing Pattern - Ineffective Goal: *Use of effective breathing techniques Outcome: Progressing Towards Goal

## 2020-12-12 NOTE — H&P
[employer] [facility] Help to Those in Need 
[phone] Patient Name: Tereasa Buerger. YOB: 1955 Date of Provider Hospice Visit: 12/11/20 Level of Care:   [x] General Inpatient (GIP)    [] Routine   [] Respite Current Location of Care: 
[x] [address] [place] [address] [] [facility] [] [address] [address] [facility] [] [facility] [] Hospice [facility], patient referred from: 
[] [address] [place] [address] [] [facility] [] [address] [] [facility] [] Home [] Other:  
 
Date of Original Hospice Admission: 12/11/2020 Hospice Medical Director at time of admission: Chantal Sutherland MD 
 
Principle Hospice Diagnosis: Parkinson's disease Diagnoses RELATED to the terminal prognosis:  
COVID-19 Other Diagnoses: Marella Requena Tereasa Buerger. is a 76y.o. year old who was admitted to [facility]. He has Parkinson's disease and was a resident of [facility]. He was admitted to [address] [place] [address] on 12/3/2020 with AHRF and sepsis. CXR demonstrated airspace disease of the left lung. He was found to be positive for COVID-19. He did not recover despite treatment. He was unable to take his chronic Parkinson's medications d/t inability to tolerate oral intake. The family chose to pursue comfort measures with the support of Hospice. He is being admitted to inpatient hospice care on 12/11 for worsening pain and restlessness. On my 12/11 exam patient is unresponsive with anticipated prognosis of hours to days. Objective information:  
12/6/2020 CXR: 
IMPRESSION:  
Worsening airspace disease throughout the left mid and lower lung. HOSPICE ASSESSMENT Active Symptoms and Issues: 
-Generalized pain 
-Anxiety/agitation/restlessness 
-Irregular/labored breating 
-Dysphagia 
-Decreased responsiveness/Unresponsiveness 
-Hospice care Since midnight and prior to my exam pt has received morphine 2 mg IV x 4 doses, ativan 1 mg IV x 3 doses, robinul 0.2 mg IV x 1 dose for symptoms. Pt would benefit from [facility] LOC for skilled monitoring of nonverbal signs of symptoms and administration/titration of parenteral medications for sx mgt. PLAN 1. GIP level of care needed for symptoms necessitating frequent skilled nursing assessment and administration of parenteral medications. Needs monitoring for need to titrate sx mgt regimen for optimization of comfort. 2. Pt is at high risk of rapid decline and death due to terminal disease process. 3. For pain and dyspnea control, we have scheduled morphine 2 mg IV every 4 hours routinely and prn pain, air hunger 4. For dyspnea, we will wean dexamethasone from 6 mg daily to 2 mg once tomorrow and will discontinue after that dose 5. For restlessness, we have scheduled ativan 1 mg IV every 4 hours routinely and prn anxiety, agitation, restlessness 6. We will have glycopyrrolate 0.2 mg IV available prn to help prevent formation of new secretions. Recommend recovery positioning for drainage of current secretions. Gentle anterior suction of secretions okay (i.e. suction around lips/teeth). Recommend avoiding deep suction to prevent irritation, pain, bleeding. 7. Prn bisacodyl for constipation 8. Prn tylenol and/or toradol for fever. 5. Counseled family on nonverbal signs of pain and restlessness 10. Will monitor sxs and prn use for need to schedule/titrate medications further 11. D/c all previous medications which are not contributing to comfort at this time 12. Bites/sips for comfort okay if patient is alert and able to participate. 15.  and SW to support family needs. Hospice SW Sabine and I held conversation today with patient's family (wife and sister-in-law present, father and sister by phone). Patient's wife requests are team's assistance with communicating with patient's father and sister so that she can focus on spending time with her . Our team has agreed to call patient's father and sister Ernie Frost daily with brief updates. 14. Disposition: anticipate that pt will decline and die in the coming hours-days without stabilizing enough for care in the home setting 15. Hospice plan of care discussed with hospice idt, patient's wife, sister-in-law, sister, and father 16. Total time 70 minutes. 45 minutes spent in counseling and coordination from 12:00-12:45 PM, including counseling family regarding my assessment and estimated prognosis, s/s of EOL, recommended medication mgt for present sxs, recommendations regarding need for GIP LOC and conditions in which we would consider transfer of care to [facility] or home setting; coordination of communication plans with family Prognosis estimated based on today's clinical assessment is:  
[x] Hours to Days   
[] Days to Weeks   
[] Other: 
 
 GOALS OF CARE Patient/Medical POA stated Goal of Care: optimize patient comfort 
 
[] I have reviewed and/or updated ACP information in the Advance Care Planning Navigator. This information is available in the [address] [place] [address] Drive link in the patient's chart header. Primary Medical Decision Maker:   Primary Decision Maker: Vika Talamantes - Spouse - [phone] Resuscitation Status: DNR If DNR is there a Durable DNR on file? : [] Yes [] No (If no, complete Durable DNR) HISTORY History obtained from: chart review, hospice idt, patient's family CHIEF COMPLAINT: patient cannot give as unresponsive The patient is:  
[] Verbal 
[] Nonverbal 
[x] Unresponsive HPI/SUBJECTIVE:   
 12/11: patient unresponsive. Multiple prns morphine and ativan needed today for pain, dyspnea, restlessness. Wife's goal is for comfort with hospice. REVIEW OF SYSTEMS The following systems were: [] reviewed  [x] unable to be reviewed as pt is unresponsive Positive ROS include bold items: 
Constitutional: fatigue, weakness, in pain, short of breath Ears/nose/mouth/throat: increased airway secretions Respiratory:shortness of breath, wheezing Gastrointestinal:poor appetite, nausea, vomiting, abdominal pain, constipation, diarrhea Musculoskeletal:pain, deformities, swelling legs Neurologic:confusion, hallucinations, weakness Psychiatric:anxiety, feeling depressed, poor sleep Endocrine: increased thirst, increased hunger Adult Non-Verbal Pain Assessment Score: 1 (after receiving medication) Face [x] 0   No particular expression or smile 
[] 1   Occasional grimace, tearing, frowning, wrinkled forehead 
[] 2   Frequent grimace, tearing, frowning, wrinkled forehead Activity (movement) [x] 0   Lying quietly, normal position 
[] 1   Seeking attention through movement or slow, cautious movement 
[] 2   Restless, excessive activity and/or withdrawal reflexes Guarding 
[x] 0   Lying quietly, no positioning of hands over areas of body 
[] 1   Splinting areas of the body, tense 
[] 2   Rigid, stiff Physiology (vital signs) [x] 0   Stable vital signs [] 1   Change in any of the following: SBP > 20mm Hg; HR > 20/minute 
[] 2   Change in any of the following: SBP > 30mm Hg; HR > 25/minute Respiratory 
[] 0   Baseline RR/SpO2, compliant with ventilator 
[x] 1   RR > 10 above baseline, or 5% drop SpO2, mild asynchrony with ventilator 
[] 2   RR > 20 above baseline, or 10% drop SpO2, asynchrony with ventilator FUNCTIONAL ASSESSMENT Palliative Performance Scale (PPS): 10 PSYCHOSOCIAL/SPIRITUAL ASSESSMENT Active Problems: 
  Parkinson's disease ([facility] [address] [facility] (12/11/2020) Past Medical History:  
Diagnosis Date  Asthma  Dementia ([facility] [address] lewy body dementia  Musculoskeletal disorder  Stool color black Past Surgical History:  
Procedure Laterality Date  HX OTHER SURGICAL  2012  
 knee  HX OTHER SURGICAL  2015  
 neck Social History Tobacco Use  Smoking status: Never Smoker  Smokeless tobacco: Never Used Substance Use Topics  Alcohol use: Not Currently Frequency: Never Family History Problem Relation Age of Onset  Dementia Mother  Heart Disease Mother  Cancer Father  Stroke Brother  Stroke Other Allergies Allergen Reactions  Alprazolam Rash Current Facility-Administered Medications Medication Dose Route Frequency  LORazepam (ATIVAN) injection 1 mg  1 mg IntraVENous Q15MIN PRN  
 acetaminophen (TYLENOL) suppository 650 mg  650 mg Rectal Q4H PRN  
 ketorolac (TORADOL) injection 15 mg  15 mg IntraVENous Q8H PRN  
 glycopyrrolate (ROBINUL) injection 0.2 mg  0.2 mg IntraVENous Q4H PRN  
 bisacodyL (DULCOLAX) suppository 10 mg  10 mg Rectal DAILY PRN  
 morphine injection 2 mg  2 mg IntraVENous Q15MIN PRN  
 sodium chloride (NS) flush 5 mL  5 mL InterCATHeter Q24H  
 morphine injection 2 mg  2 mg IntraVENous Q4H  
 LORazepam (ATIVAN) injection 1 mg  1 mg IntraVENous Q4H  
 [START ON 12/12/2020] dexamethasone (DECADRON) 4 mg/mL injection 2 mg  2 mg IntraVENous ONCE PHYSICAL EXAM  
 
Wt Readings from Last 3 Encounters:  
12/11/20 74.3 kg (163 lb 12.8 oz) 12/08/20 74.3 kg (163 lb 11.2 oz) 08/27/20 70.8 kg (156 lb) Visit Vitals /73 (BP 1 Location: Left arm, BP Patient Position: At rest) Pulse 81 Temp 97.9 °F (36.6 °C) Resp 12 Ht 5' 7\" (1.702 m) Wt 74.3 kg (163 lb 12.8 oz) SpO2 93% BMI 25.66 kg/m² Supplemental O2  [] Yes  [] NO Last bowel movement:  
 
Currently this patient has: 
[] Peripheral IV [] PICC  [] PORT [] ICD   
 [] Cortez Catheter [] NG Tube   [] PEG Tube   
[] Rectal Tube [] Drain 
[] Other:  
 
Constitutional: lying abed, frail, appears more comfortable after medications Eyes: lids normal, no drainage ENMT: dry mm, no exudate, nares normal 
Cardiovascular: normal rate, peripheral pulses palpable Respiratory: rate 20, shallow breaths Gastrointestinal: soft, NT 
Musculoskeletal: no gross deformity or TTP Skin: warm, dry, no mottling Neurologic: does not wake or stir to exam 
Psychiatric: unresponsive, not restless at this time Pertinent Lab and or Imaging Tests: 
Lab Results Component Value Date/Time Sodium 141 12/07/2020 01:02 AM  
 Potassium 3.4 (L) 12/07/2020 01:02 AM  
 Chloride 109 (H) 12/07/2020 01:02 AM  
 CO2 25 12/07/2020 01:02 AM  
 Anion gap 7 12/07/2020 01:02 AM  
 Glucose 98 12/07/2020 01:02 AM  
 BUN 22 (H) 12/07/2020 01:02 AM  
 Creatinine 0.63 (L) 12/07/2020 01:02 AM  
 BUN/Creatinine ratio 35 (H) 12/07/2020 01:02 AM  
 GFR est AA >60 12/07/2020 01:02 AM  
 GFR est non-AA >60 12/07/2020 01:02 AM  
 Calcium 8.0 (L) 12/07/2020 01:02 AM  
 
Lab Results Component Value Date/Time Protein, total 6.6 12/03/2020 01:34 AM  
 Albumin 3.1 (L) 12/07/2020 12:56 AM  
 
   
 
Danial Robins MD 
[facility]

## 2020-12-12 NOTE — HOSPICE
[facility] Good Help to Those in Need 
[phone] GIP Daily Nursing Note Patient Name: Bruna Dutta. YOB: 1955 Age: 76 y.o. Date of Visit: 12/12/20 Facility of Care: [facility] Patient Room: 215/01 Hospice Attending: Sree Morales MD 
Hospice Diagnosis: Parkinson's disease ([facility] [address] Manjug Yisel Level of Care: GIP Current GIP Symptoms 1. Dyspnea- RR of 22- periods of apnea 10-15 seconds 2. Pain-furrowed brow 3. Anxiety- stiff rigid appearance 4. Unresponsive, does not track with eyes 5. COVID + ASSESSMENT & PLAN Must update Plan of Care including visit frequencies for IDT members 1. IV morphine 2mg every 4 hours scheduled, prn available every 15 minutes as needed for severe pain,dyspnea 2. IV ativan 1 mg every 4 hours scheduled, prn available every 15 minutes for severe anxiety, agitation 3. IV toradol, tylenol suppository as needed for fever 4. IV robinul 0.2mg every 4 hours as needed for secretions 5. Turn and reposition patient every 4 hours for comfort, boyle care per protocol, central line care managed by RN staff 6. Support family Spiritual Interventions:none at this time,  spoke with family yesterday Psych/ Social/ Emotional Interventions: patient was a surfer, I have surfing music videos playing in the room Care Coordination Needs: reviewed with Dr. Jannie Santoyo and family Care plan and New Orders discussed / approved with Dr. Jannie Santoyo MD. Description History and Chart Review If this is initial GIP note must document RN assessment/MD communication in previous setting. Specifically document nursing/medication needs in last 24 hours to support GIP care Narrative History of last 24 hours that demonstrates care cannot be provided in another setting: 
Patient requiring IV medications, titration of medications, Rn monitoring What has been done to control the patient's symptoms in the last 24 hours? IV medications, prn medication Does the patient currently require IV medications? yes Does the patient currently require scheduled medications? yes Does the patient currently require a PCA? no 
 
List number of doses of PRN medications in last 24 hours: 
Medication 1: 
Number of doses: 
 
Medication 2:  
Number of doses: 
 
Medication 3:  
Number of doses: Supporting documentation for GIP need for pain control: 
[x] Frequent evaluation by a doctor, nurse practitioner, nurse  
[] Frequent medication adjustment   
[x] IVs that cannot be administered at home  
[] Aggressive pain management  
[] Complicated technical delivery of medications Supporting documentation for GIP need for symptom control: 
[x]  Sudden decline necessitating intensive nursing intervention 
[]  Uncontrolled / intractable nausea or vomiting  
[]  Pathological fractures 
[]  Advanced open wounds requiring frequent skilled care [x] Unmanageable respiratory distress 
[] New or worsening delirium  
[] Delirium with behavior issues: Is 24 hour caregiver present due to safety concerns with agitation? (yes/no) [] Imminent death  with skilled nursing needs documented above DISCHARGE PLANNING Daily discharge planning required for GIP 1. Discharge Plan: patient appears imminent, will likely pass at [facility] 2. Patient/Family teaching: end of life signs and symptoms 3. Response to patient/family teaching: reviewed with family ASSESSMENT   
KARNOFSKY: 10 Prognosis estimated based on 12/12/20 clinical assessment is:  
[x] Few to Many Hours [] Hours to Days  
[] Few to Many Days  
[] Days to Weeks  
[] Few to Many Weeks  
[] Weeks to Months  
[] Few to Many Months Quality Measure: Patient self-reports:  [] Yes    [x] No 
 
ESAS:  
Time of Assessment: 1000 Pain (1-10):8 Fatigue (1-10): 8 Shortness of breath (1-10):10 Nausea (1-10): 5 Appetite (1-10): Anxiety: (1-10): Depression: (1-10): Well-being: (1-10):  
 Constipation: _ Yes  _ No 
LAST BM: 12/11/2020 CLINICAL INFORMATION Patient Vitals for the past 12 hrs: 
 Temp Pulse Resp BP SpO2  
12/12/20 0813 98 °F (36.7 °C) 90 14 116/81 92 % Currently this patient has: 
[] Supplemental O2 [x] IV [x] PICC [] PORT  
[] NG Tube   
[] PEG Tube  
[] Ostomy    
[x] Cortez draining doris urine 
[] Other:  
 
SIGNS/PHYSICAL FINDINGS Skin (including wound): 
[] Warm, dry, supple, intact and color normal for race [x] Warm  
[x] Dry  
[] Cool    
[] Clammy      
[] Diaphoretic Turgor 
 [] Normal 
 [x] Decreased Color: 
 [] Pink [x] Pale 
 [] Cyanotic 
 [] Erythema 
 [] Jaundice [] Normal for Race 
[]  Wounds: 
 
Neuro: 
[] Lethargy 
[] Restlessness / agitation 
[] Confusion / delirium 
[] Hallucinations 
[] Responds to maximal stimulation 
[x] Unresponsive 
[] Seizures Cardiac: 
[x] Dyspnea on Exertion 
[] JVD [] Murmur 
[] Palpitations [] Hypotension 
[] Hypertension 
[] Tachycardia [] Bradycardia [x] Irregular HR 
[] Pulses Decreased 
[] Pulses Absent 
[] Edema:       (Location, Grade and Pitting) [] Mottling:      (Location) Respiratory: 
Breath sounds:  
 [x] Diminished 
 [] Wheeze 
 [] Rhonchi 
 [] Rales  
[] Even and unlabored 
[x] Labored: 22- shallow  
[] Cough 
 [] Non Productive 
 [] Productive 
  [] Description:          
[] Deep suctioned  
[] O2 at ___ LPM 
[] High flow oxygen greater than 10 LPM 
[] Bi-Pap GI [x] Abdomen (describe) flat, soft 
[] Ascites 
[] Nausea 
[] Vomiting 
[x] Incontinent of bowels [x] Bowel sounds (yes/no) [] Diarrhea 
[] Constipation (see above including last bowel movement) [] Checked for impaction 
[x] Last BM 12/11/2020 Nutrition Diet:NPO Appetite:  
[] Good  
[] Fair  
[] Poor  
[] Tube Feeding  
[] Voiding 
[] Incontinent [x] Cortez Musculoskeletal 
[] Balance/[place] Unsteady [x] Weak Strength:  
 [] Normal  
 [] Limited [x] Decreasing Activities:  
 [] Up as tolerated [x] Bedridden  
 [] Specify: 
 
SAFETY [] 24 hr. Caregiver [x] Side rails ? [x] Hospital bed  
[] Reviewed Falls & Safety ALLERGIES AND MEDICATIONS Allergies: Allergies Allergen Reactions  Alprazolam Rash Current Facility-Administered Medications Medication Dose Route Frequency  LORazepam (ATIVAN) injection 1 mg  1 mg IntraVENous Q15MIN PRN  
 acetaminophen (TYLENOL) suppository 650 mg  650 mg Rectal Q4H PRN  
 ketorolac (TORADOL) injection 15 mg  15 mg IntraVENous Q8H PRN  
 glycopyrrolate (ROBINUL) injection 0.2 mg  0.2 mg IntraVENous Q4H PRN  
 bisacodyL (DULCOLAX) suppository 10 mg  10 mg Rectal DAILY PRN  
 morphine injection 2 mg  2 mg IntraVENous Q15MIN PRN  
 sodium chloride (NS) flush 5 mL  5 mL InterCATHeter Q24H  
 morphine injection 2 mg  2 mg IntraVENous Q4H  
 LORazepam (ATIVAN) injection 1 mg  1 mg IntraVENous Q4H Visit Time In: 1000 Visit Time Out: 2878

## 2020-12-12 NOTE — PROGRESS NOTES
Problem: Anxiety Goal: *Alleviation of anxiety Outcome: Progressing Towards Goal 
  
Problem: Breathing Pattern - Ineffective Goal: *Use of effective breathing techniques Outcome: Progressing Towards Goal 
  
Problem: Pain Goal: *Control of acute pain Outcome: Progressing Towards Goal 
  
Problem: Anticipatory Grief Goal: Grief heard and acknowledged, anxiety reduced, patient coping identified, patient/family expressed gratitude Description: Wife Zeyad Garcia and 21year old son Jerson Estrella will have support in processing anticipatory grief and relational loss and will have  referral to pre-bereavement within 5 days. Outcome: Progressing Towards Goal 
  
Problem: Impaired Family Dynamics Goal: Verbalize feelings on relationships, problems, and/or fears Description: Zeyad Garcia and family will have support in coping with strained family dynamics within 5 days.   
Outcome: Progressing Towards Goal

## 2020-12-12 NOTE — PROGRESS NOTES
Bedside shift change report given to Justin Stauffer (oncoming nurse) by Len Mo RN (offgoing nurse). Report included the following information SBAR, Kardex, MAR and Recent Results.

## 2020-12-13 NOTE — PROGRESS NOTES
Bedside shift change report given to Slade Griffiths,  (oncoming nurse) by Liz Marie RN (offgoing nurse). Report included the following information SBAR, Kardex, MAR and Recent Results.

## 2020-12-13 NOTE — HOSPICE
[facility] Social Worker Phone Call/Support Call: LCSW called pts wife Ame Wilkerson) to offer support and assess needs. Pts wife stated they had a great visit with pt today. Pts wife stated they listening to music with pt and danced. Pts wife stated it was her and her son (Mihai 21 yrs old). Pts wife stated they also facetimed pts brother for him to see pt which was special as they have been estranged. Pts wife stated that she was interested in having pre-bereavement/bereavement support for her son and herself. Pts wife stated she worried about pts son as he is young and cared for pt a early age (23 yrs). Pts wife stated pts son has good support from family and a good group of friends. Pts wife stated that he is a sophomore at [employer] and plans to go back to school in January. Pts wife stated they were both accepting. Pts wife stated she talked about bereavement support with her son and he reluctantly stated he would be open to meeting with someone or talking with someone. Pts wife stated she wanted to make sure it was presented in a manner that this was support provided to all family and that her son didn't feel like he was being singled out. Pts wife stated she would love to speak with bereavement counselor tomorrow if possible. LCSW relayed that the on call SW was actually one of our bereavement counselors. Dre Post stated she would like bereavement counselor to call her on Sunday morning to come up with a plan on how to connect pts son with her. Dre Post stated she thought bereavement groups might interest him as well and LCSW provided information about this. Dre Post stated that her mother passed away in January 2020 and her pts mother passed away 12/13/2017. Dre Post stated she wondered if pt would pass on his mothers anniversary. LCSW provided support and encouragement for pts wife to call with any needs. LCSW will continue to monitor and assess needs. Kelsea Carrasco LCSW [facility] Worker [phone]

## 2020-12-13 NOTE — PROGRESS NOTES
Bedside and Verbal shift change report given to Topher Klein (oncoming nurse) by Nacho Xiong RN (offgoing nurse). Report included the following information SBAR and Kardex.

## 2020-12-13 NOTE — HOSPICE
[facility] Good Help to Those in Need 
[phone] GIP Daily Nursing Note Patient Name: Nico Price. YOB: 1955 Age: 76 y.o. Date of Visit: 12/13/20 Facility of Care: [address] [place] [address] Patient Room: [facility]/ Hospice Attending: Nicole Mcclendon MD 
Hospice Diagnosis: Parkinson's disease ([address] Verlene Ormond Level of Care: GIP Current GIP Symptoms 1. Dyspnea 2. Pain 3. Diaphoresis 4. anxiety ASSESSMENT & PLAN Must update Plan of Care including visit frequencies for IDT members 1. RR of 20, periods of apnea, very shallow breathing, appears imminent 2. IV morphine 2mg every 4 hours scheduled, prn available as needed for severe pain, dyspnea 3. IV ativan 1mg every 4 hours scheduled for anxiety, agitation, prn available for severe anxiety, agitation 4. IV toradol re-ordered for fever, checked temp 98.9 axillary 5. IV robinul 0.2mg as needed for secretions 6. Music playing at bedside, support from MSW given to family yesterday Spiritual Interventions: holy water and oil at bedside,  available 24 hours a day in hospital setting Psych/ Social/ Emotional Interventions: MSW working with family for pre-bereavement services to begin for wife and son. Care Coordination Needs: Reviewed with Dr. Sara Marcus Care plan and New Orders discussed / approved with Dr. Sara Marcus MD. Description History and Chart Review If this is initial GIP note must document RN assessment/MD communication in previous setting. Specifically document nursing/medication needs in last 24 hours to support GIP care Narrative History of last 24 hours that demonstrates care cannot be provided in another setting: 
Patient requiring IV medications, titration of medications, Rn monitoring What has been done to control the patient's symptoms in the last 24 hours? IV medications, prn medications Does the patient currently require IV medications? yes Does the patient currently require scheduled medications? yes Does the patient currently require a PCA? no 
 
List number of doses of PRN medications in last 24 hours: 
Medication 1: 
Number of doses: 
 
Medication 2:  
Number of doses: 
 
Medication 3:  
Number of doses: Supporting documentation for GIP need for pain control: 
[x] Frequent evaluation by a doctor, nurse practitioner, nurse  
[] Frequent medication adjustment   
[x] IVs that cannot be administered at home  
[] Aggressive pain management  
[] Complicated technical delivery of medications Supporting documentation for GIP need for symptom control: 
[x]  Sudden decline necessitating intensive nursing intervention 
[]  Uncontrolled / intractable nausea or vomiting  
[]  Pathological fractures 
[]  Advanced open wounds requiring frequent skilled care [x] Unmanageable respiratory distress 
[] New or worsening delirium  
[] Delirium with behavior issues: Is 24 hour caregiver present due to safety concerns with agitation? (yes/no) [x] Imminent death  with skilled nursing needs documented above DISCHARGE PLANNING Daily discharge planning required for GIP 1. Discharge Plan: patient appears imminent, will likely pass at [facility] 2. Patient/Family teaching: end of life signs and symptoms 3. Response to patient/family teaching: reviewed with family ASSESSMENT   
KARNOFSKY: 10 Prognosis estimated based on 12/13/20 clinical assessment is:  
[x] Few to Many Hours [] Hours to Days  
[] Few to Many Days  
[] Days to Weeks  
[] Few to Many Weeks  
[] Weeks to Months  
[] Few to Many Months Quality Measure: Patient self-reports:  [] Yes    [x] No 
 
ESAS:  
Time of Assessment: 2168 Pain (1-10):8 Fatigue (1-10): 8 Shortness of breath (1-10):8 Nausea (1-10):5 Appetite (1-10): 10 Anxiety: (1-10): 8 Depression: (1-10): 8 Well-being: (1-10): 8 Constipation: _ Yes  _x No 
LAST BM: 12/12/2020 CLINICAL INFORMATION  
 
 Patient Vitals for the past 12 hrs: 
 Temp Pulse Resp BP SpO2  
12/13/20 0930 98.7 °F (37.1 °C) 96 14 115/78 92 % Currently this patient has: 
[] Supplemental O2 [x] IV [x] PICC- quad lumen IJ     
[] PORT  
[] NG Tube   
[] PEG Tube  
[] Ostomy    
[x] Cortez draining doris urine 
[] Other:  
 
SIGNS/PHYSICAL FINDINGS Skin (including wound): 
[] Warm, dry, supple, intact and color normal for race [x] Warm  
[x] Dry  
[] Cool    
[] Clammy [x] Diaphoretic Turgor 
 [] Normal 
 [x] Decreased Color: 
 [] Pink [x] Pale 
 [] Cyanotic 
 [] Erythema 
 [] Jaundice [] Normal for Race 
[]  Wounds: 
 
Neuro: 
[] Lethargy 
[] Restlessness / agitation 
[] Confusion / delirium 
[] Hallucinations 
[] Responds to maximal stimulation 
[x] Unresponsive 
[] Seizures Cardiac: 
[x] Dyspnea on Exertion 
[] JVD [] Murmur 
[] Palpitations [] Hypotension 
[] Hypertension 
[] Tachycardia [] Bradycardia [x] Irregular HR [x] Pulses Decreased 
[] Pulses Absent 
[] Edema:       (Location, Grade and Pitting) [] Mottling:      (Location) Respiratory: 
Breath sounds:  
 [x] Diminished 
 [] Wheeze 
 [] Rhonchi 
 [] Rales  
[] Even and unlabored 
[x] Labored:  22- very shallow         
[] Cough 
 [] Non Productive 
 [] Productive 
  [] Description:          
[] Deep suctioned  
[] O2 at ___ LPM 
[] High flow oxygen greater than 10 LPM 
[] Bi-Pap GI [x] Abdomen (describe) soft 
[] Ascites 
[] Nausea 
[] Vomiting 
[x] Incontinent of bowels [x] Bowel sounds (yes/no) [] Diarrhea 
[] Constipation (see above including last bowel movement) [] Checked for impaction 
[x] Last BM 12/12/2020 Nutrition Diet:NPO Appetite:  
[] Good  
[] Fair  
[] Poor  
[] Tube Feeding  
[] Voiding 
[] Incontinent [x] [place] Musculoskeletal 
[] Balance/[place] Unsteady [x] Weak Strength:  
 [] Normal  
 [] Limited [x] Decreasing Activities:  
 [] Up as tolerated 
 [x] Bedridden  
 [] Specify: 
 
SAFETY [] 24 hr. Caregiver [x] Side rails ? [x] Hospital bed  
[] Reviewed Falls & Safety ALLERGIES AND MEDICATIONS Allergies: Allergies Allergen Reactions  Alprazolam Rash Current Facility-Administered Medications Medication Dose Route Frequency  LORazepam (ATIVAN) injection 1 mg  1 mg IntraVENous Q15MIN PRN  
 acetaminophen (TYLENOL) suppository 650 mg  650 mg Rectal Q4H PRN  
 glycopyrrolate (ROBINUL) injection 0.2 mg  0.2 mg IntraVENous Q4H PRN  
 bisacodyL (DULCOLAX) suppository 10 mg  10 mg Rectal DAILY PRN  
 morphine injection 2 mg  2 mg IntraVENous Q15MIN PRN  
 sodium chloride (NS) flush 5 mL  5 mL InterCATHeter Q24H  
 morphine injection 2 mg  2 mg IntraVENous Q4H  
 LORazepam (ATIVAN) injection 1 mg  1 mg IntraVENous Q4H Visit Time In: 9205 Visit Time Out: 1200

## 2020-12-14 NOTE — PROGRESS NOTES
[facility] to Those in Need 
[phone] Patient Name: Zoë Almanza. YOB: 1955 Date of Provider Hospice Visit: 12/14/20 Level of Care:   [x] General Inpatient (GIP)    [] Routine   [] Respite Current Location of Care: 
[x] [facility] [] [facility] [] [facility] [] [facility] [] [facility], patient referred from: 
[] [facility] [] [facility] [] [facility] [] [facility] [] Home [] Other:  
 
Date of Original Hospice Admission: 12/11/2020 Hospice Medical Director at time of admission: Noemy Renee MD 
 
Principle Hospice Diagnosis: Parkinson's disease Diagnoses RELATED to the terminal prognosis:  
COVID-19 Other Diagnoses: Cathy Almanza. is a 76y.o. year old who was admitted to [facility]. He has Parkinson's disease and was a resident of [facility]. He was admitted to [facility] on 12/3/2020 with AHRF and sepsis. CXR demonstrated airspace disease of the left lung. He was found to be positive for COVID-19. He did not recover despite treatment. He was unable to take his chronic Parkinson's medications d/t inability to tolerate oral intake. The family chose to pursue comfort measures with the support of Hospice. He is being admitted to inpatient hospice care on 12/11 for worsening pain and restlessness. On my 12/11 exam patient is unresponsive with anticipated prognosis of hours to days. Objective information:  
12/6/2020 CXR: 
IMPRESSION:  
Worsening airspace disease throughout the left mid and lower lung. HOSPICE ASSESSMENT Active Symptoms and Issues: 
-Generalized pain 
-Anxiety/agitation/restlessness 
-Irregular/breathing /periods of apnea  
-Dysphagia 
-Unresponsiveness 
-Hospice care PLAN 1. GIP level of care needed secondary to frequent nursing assessment, IV medication management, patient unsafe to transition to sublingual and/or oral medication at this time. 2. Pt is at high risk of rapid decline and death due to terminal disease process. 3. For pain and dyspnea control, he is on scheduled morphine 2 mg IV every 4 hours routinely and prn pain, air hunger, not used any prn today . 4. For restlessness, on scheduled ativan 1 mg IV every 4 hours routinely and prn anxiety, agitation, restlessness. No as needed doses 5. We will have glycopyrrolate 0.2 mg IV available prn to help prevent formation of new secretions. Recommend  Gentle anterior suction of secretions okay (i.e. suction around lips/teeth). Recommend avoiding deep suction to prevent irritation, pain, bleeding. 6. Prn bisacodyl for constipation 7. Prn tylenol and/or toradol for fever. 8.  Psychosocial-spent time talking with patient's wife. There are significant dynamics between patient's wife and 's family to include his father and sister. His wife explained the different dynamics which has added additional stress to her situation. Ultimately, patient's wife is POA and makes the decisions who we should speak to. She certainly gives me permission to talk with patient's father and sister today. Sometimes they apparently can be very challenging to her on the phone. Explained that we can be the go between and I hope this relieves some stress for her. She is tearful and appreciates any assistance we can provide. In the meantime, patient remains comfortable and she is appreciative of the care that he has received. 9. Attempted to talk patient's father but he did not answer the phone. I then talked with patient's sisterRocio. Explained the current medical issues and that patient is reaching end-of-life care. His prognosis is limited to hours to maybe days. Answered all of her questions about the current medical issues and reassured her that we are making him comfortable. She was able to face time earlier to see patient. She states she will talk with her father/patient's father and update him. Explained that I had left him a message earlier. 10. Reviewed plan with bedside nurse, hospice liaison, . 11. Will monitor sxs and prn use for need to schedule/titrate medications further 12.  and SW to support family needs. 13. Disposition: anticipate that pt will decline and die in the coming hours-days without stabilizing enough for care in the home setting 14. Hospice plan of care discussed with hospice idt, patient's wife, sister-in-law, sister, and father. 15. Prognosis estimated based on today's clinical assessment is  
[x] Hours to Days   
[] Days to Weeks   
[] Other: 
 
Plan of care communicated with bed side Rn and hospice Rn Marycarmen 391. GOALS OF CARE Patient/Medical POA stated Goal of Care: optimize patient comfort 
 
[] I have reviewed and/or updated ACP information in the Advance Care Planning Navigator. This information is available in the [address] [place] [address] Drive link in the patient's chart header. Primary Medical Decision Maker:   Primary Decision Maker: Vika Talamantes - Spouse - [phone] Resuscitation Status: DNR If DNR is there a Durable DNR on file? : [] Yes [] No (If no, complete Durable DNR) HISTORY History obtained from: chart review, hospice idt, patient's family CHIEF COMPLAINT: patient cannot give as unresponsive The patient is:  
[] Verbal 
[] Nonverbal 
[x] Unresponsive HPI/SUBJECTIVE:   
 12/11: patient unresponsive. Multiple prns morphine and ativan needed today for pain, dyspnea, restlessness. Wife's goal is for comfort with hospice. 12/12; patient remains unresponsive . 12/14patient is unresponsive. No as needed doses needed. Currently appears comfortable REVIEW OF SYSTEMS The following systems were: [] reviewed  [x] unable to be reviewed as pt is unresponsive Positive ROS include bold items: 
Constitutional: fatigue, weakness, in pain, short of breath Ears/nose/mouth/throat: increased airway secretions Respiratory:shortness of breath, wheezing Gastrointestinal:poor appetite, nausea, vomiting, abdominal pain, constipation, diarrhea Musculoskeletal:pain, deformities, swelling legs Neurologic:confusion, hallucinations, weakness Psychiatric:anxiety, feeling depressed, poor sleep Endocrine: increased thirst, increased hunger Adult Non-Verbal Pain Assessment Score: 0 (after receiving medication) Face [x] 0   No particular expression or smile 
[] 1   Occasional grimace, tearing, frowning, wrinkled forehead 
[] 2   Frequent grimace, tearing, frowning, wrinkled forehead Activity (movement) [x] 0   Lying quietly, normal position 
[] 1   Seeking attention through movement or slow, cautious movement 
[] 2   Restless, excessive activity and/or withdrawal reflexes Guarding 
[x] 0   Lying quietly, no positioning of hands over areas of body 
[] 1   Splinting areas of the body, tense 
[] 2   Rigid, stiff Physiology (vital signs) [x] 0   Stable vital signs [] 1   Change in any of the following: SBP > 20mm Hg; HR > 20/minute 
[] 2   Change in any of the following: SBP > 30mm Hg; HR > 25/minute Respiratory [x] 0   Baseline RR/SpO2, compliant with ventilator 
[] 1   RR > 10 above baseline, or 5% drop SpO2, mild asynchrony with ventilator 
[] 2   RR > 20 above baseline, or 10% drop SpO2, asynchrony with ventilator FUNCTIONAL ASSESSMENT Palliative Performance Scale (PPS): 10 PSYCHOSOCIAL/SPIRITUAL ASSESSMENT Active Problems: 
  Parkinson's disease ([facility] (12/11/2020) Past Medical History:  
Diagnosis Date  Asthma  Dementia ([facility] lewy body dementia  Musculoskeletal disorder  Stool color black Past Surgical History:  
Procedure Laterality Date  HX OTHER SURGICAL  2012  
 knee  HX OTHER SURGICAL  2015  
 neck Social History Tobacco Use  Smoking status: Never Smoker  Smokeless tobacco: Never Used Substance Use Topics  Alcohol use: Not Currently Frequency: Never Family History Problem Relation Age of Onset  Dementia Mother  Heart Disease Mother  Cancer Father  Stroke Brother  Stroke Other Allergies Allergen Reactions  Alprazolam Rash Current Facility-Administered Medications Medication Dose Route Frequency  ketorolac (TORADOL) injection 15 mg  15 mg IntraVENous Q8H PRN  
 LORazepam (ATIVAN) injection 1 mg  1 mg IntraVENous Q15MIN PRN  
 acetaminophen (TYLENOL) suppository 650 mg  650 mg Rectal Q4H PRN  
 glycopyrrolate (ROBINUL) injection 0.2 mg  0.2 mg IntraVENous Q4H PRN  
 bisacodyL (DULCOLAX) suppository 10 mg  10 mg Rectal DAILY PRN  
 morphine injection 2 mg  2 mg IntraVENous Q15MIN PRN  
 sodium chloride (NS) flush 5 mL  5 mL InterCATHeter Q24H  
 morphine injection 2 mg  2 mg IntraVENous Q4H  
 LORazepam (ATIVAN) injection 1 mg  1 mg IntraVENous Q4H PHYSICAL EXAM  
 
Wt Readings from Last 3 Encounters:  
12/11/20 74.3 kg (163 lb 12.8 oz) 12/08/20 74.3 kg (163 lb 11.2 oz) 08/27/20 70.8 kg (156 lb) Visit Vitals /79 (BP 1 Location: Left arm, BP Patient Position: At rest) Pulse 89 Temp 97.2 °F (36.2 °C) Resp 12 Ht 5' 7\" (1.702 m) Wt 74.3 kg (163 lb 12.8 oz) SpO2 91% BMI 25.66 kg/m² Supplemental O2  [] Yes  [x] NO Last bowel movement:  
 
Currently this patient has: [] Peripheral IV [] PICC  [] PORT [] ICD [] Cortez Catheter [] NG Tube   [] PEG Tube   
[] Rectal Tube [] Drain 
[] Other:  
 
Generalno acute distress, unresponsive HEENTdry oral mucosa Lungsvery few scattered secretions noted, diminished at the bases Cardiovascularslightly tachycardic Abdomensoft, nontender Extremitiesno clubbing, cyanosis, edema Skin warm to the touch Neurounresponsive. Pertinent Lab and or Imaging Tests: 
Lab Results Component Value Date/Time Sodium 141 12/07/2020 01:02 AM  
 Potassium 3.4 (L) 12/07/2020 01:02 AM  
 Chloride 109 (H) 12/07/2020 01:02 AM  
 CO2 25 12/07/2020 01:02 AM  
 Anion gap 7 12/07/2020 01:02 AM  
 Glucose 98 12/07/2020 01:02 AM  
 BUN 22 (H) 12/07/2020 01:02 AM  
 Creatinine 0.63 (L) 12/07/2020 01:02 AM  
 BUN/Creatinine ratio 35 (H) 12/07/2020 01:02 AM  
 GFR est AA >60 12/07/2020 01:02 AM  
 GFR est non-AA >60 12/07/2020 01:02 AM  
 Calcium 8.0 (L) 12/07/2020 01:02 AM  
 
Lab Results Component Value Date/Time Protein, total 6.6 12/03/2020 01:34 AM  
 Albumin 3.1 (L) 12/07/2020 12:56 AM  
 
   
 
Kylie Alfred MD 
[facility]

## 2020-12-14 NOTE — PROGRESS NOTES
Bedside shift change report given to Scarlet Hurtado (oncoming nurse) by Flex Hammond RN (offgoing nurse). Report included the following information SBAR, Kardex, MAR and Recent Results.

## 2020-12-14 NOTE — HOSPICE
[facility] LCSW note: This LCSW called pts wife Collette Pelaez. LCSW provided reflective listening for Collette Pelaez as she talked about pts changes overnight and concerns about strained family dynamics between herself and pts father and sister. LCSW validated her concerns and provided supportive counseling. Collette Pelaez noted her father and [place] were not contacted over the weekend for scheduled updates. Collette Salazarerly reports [facility] will call [place] and pts father today and she will tray to facilitate face time call with pts father and sister. LCSW and Collette Benigno discussed her role as caregiver for pt. LCSW provided affirmation of her role and validate frustration she is feeling with pts family about it as there is long standing relational strain LCSW and Colletteshawn SalazarBenigno discussed Bereavement support for her and son Fermín Bauer. Collette Salazarerly reports they will talk to [facility] later in the week for follow -up for pre-bereavement services for son.

## 2020-12-14 NOTE — PROGRESS NOTES
Patient resting in bed, family present. Patient appearing more imminent. Problem: Risk for Spread of Infection Goal: Prevent transmission of infectious organism to others Description: Prevent the transmission of infectious organisms to other patients, staff members, and visitors. Outcome: Progressing Towards Goal 
  
Problem: Patient Education:  Go to Education Activity Goal: Patient/Family Education Outcome: Progressing Towards Goal 
  
Problem: Infection - Risk of, Central Venous Catheter-Associated Bloodstream Infection Goal: *Absence of infection signs and symptoms Outcome: Progressing Towards Goal 
  
Problem: Infection - Risk of, Urinary Catheter-Associated Urinary Tract Infection Goal: *Absence of infection signs and symptoms Outcome: Progressing Towards Goal 
  
Problem: Anxiety Goal: *Alleviation of anxiety Outcome: Progressing Towards Goal 
  
Problem: Pain Goal: *Control of acute pain Outcome: Progressing Towards Goal 
  
Problem: Pressure Injury - Risk of 
Goal: *Prevention of pressure injury Outcome: Progressing Towards Goal 
  
Problem: Falls - Risk of 
Goal: *Absence of Falls Description: Document David Guthrie Fall Risk and appropriate interventions in the flowsheet. Outcome: Progressing Towards Goal 
Note: Fall Risk Interventions: 
Mobility Interventions: Communicate number of staff needed for ambulation/transfer Mentation Interventions: Adequate sleep, hydration, pain control, Bed/chair exit alarm Medication Interventions: Evaluate medications/consider consulting pharmacy Elimination Interventions: Toileting schedule/hourly rounds History of Falls Interventions: Evaluate medications/consider consulting pharmacy Problem: Pressure Injury - Risk of 
Goal: *Prevention of pressure injury Description: Document Sunny Scale and appropriate interventions in the flowsheet. Outcome: Progressing Towards Goal 
Note: Pressure Injury Interventions: Sensory Interventions: Float heels, Keep linens dry and wrinkle-free, Minimize linen layers Moisture Interventions: Apply protective barrier, creams and emollients, Absorbent underpads Activity Interventions: Pressure redistribution bed/mattress(bed type) Mobility Interventions: Pressure redistribution bed/mattress (bed type) Nutrition Interventions: (NPO) Friction and Shear Interventions: Apply protective barrier, creams and emollients, Lift sheet, Minimize layers Problem: Patient Education: Go to Patient Education Activity Goal: Patient/Family Education Outcome: Progressing Towards Goal 
  
Problem: Emotional Support Needs Goal: Patient/family is receiving emotional support Description: Kalen Eubanks, elsa Fairchild and family will have emotional support and supportive counseling in coping with pts EOL due to PD within 5 days. Outcome: Progressing Towards Goal 
  
Problem: Anticipatory Grief Goal: Grief heard and acknowledged, anxiety reduced, patient coping identified, patient/family expressed gratitude Description: Wife Kalen Eubanks and 21year old son Cindy Fairchild will have support in processing anticipatory grief and relational loss and will have  referral to pre-bereavement within 5 days. Outcome: Progressing Towards Goal 
  
Problem: Impaired Family Dynamics Goal: Verbalize feelings on relationships, problems, and/or fears Description: Kalen Eubanks and family will have support in coping with strained family dynamics within 5 days.   
Outcome: Progressing Towards Goal

## 2020-12-14 NOTE — HOSPICE
HOSPICE 
PRE-BEREAVEMENT COUNSELING SUPPORT 
 
11:05-11:15 am -- Hospice social worker had contacted this writer to contact the family to offer support regarding anticipatory grief. LCSW (also counselor on call/bereavement counselor) called patient's wife to offer pre-bereavement support for family; explained services for the family. She expressed her preference is to prioritize her 17-year-old son for grief support. Plan was developed for LCSW to call later in the day at a specific time. 1:45 pm -- LCSW called patient's son Fermín Bauer; no answer, unable to leave voicemail. LCSW called patient's wife. She gave the phone to Fermín Bauer. LCSW introduced self, explained role. He said that he \"was not in a talking mood\" and preferred to spend time with his father. LCSW offered to call later in the day, later in the week, or in a few weeks; he requested call later in the week. INTERVENTIONS:  LCSW provided explained pre-bereavement and bereavement counseling; provided emotional support and active listening. PLAN:  LCSW will call patient's son later in the week as agreed upon. SIGNED:  Abi Hassan LCSW [facility] -- Bereavement Counselor

## 2020-12-14 NOTE — PROGRESS NOTES
This was an initial visit to assess needs and offer support. Introduced myself to wife at the door as patient is Covid -23. She thanked me for coming but noted no needs at this time. They are [employer] and prayers were offered in the hallway by our  who  [facility].

## 2020-12-14 NOTE — PROGRESS NOTES
Bedside shift change report given to [address] (oncoming nurse) by Laina Gloria (offgoing nurse). Report included the following information SBAR, Kardex, MAR and Recent Results.

## 2020-12-14 NOTE — PROGRESS NOTES
12/14/20 1425 Vital Signs Temp 97.7 °F (36.5 °C) Temp Source Oral  
Pulse (Heart Rate) 81 Heart Rate Source Monitor Resp Rate 16  
O2 Sat (%) 92 % Level of Consciousness (!) Responds to Pain BP 95/69 MAP (Calculated) 78 BP 1 Method Automatic  
BP 1 Location Right arm BP Patient Position Supine MEWS Score 4 Oxygen Therapy O2 Device Room air  
hospice patient

## 2020-12-14 NOTE — HOSPICE
[facility] Those in Need 
[phone] GIP Daily Nursing Note Patient Name: Juliet Waters YOB: 1955 Age: 76 y.o. Date of Visit: 12/14/20 Facility of Care: [facility] Patient Room: 215/01 Hospice Attending: Yaar Patel MD 
Hospice Diagnosis: Parkinson's disease ([address] Stephanievenmarisel Levine Level of Care: GIP Current GIP Symptoms 1. Dyspnea 2. Pain 3. Anxiety ASSESSMENT & PLAN Must update Plan of Care including visit frequencies for IDT members 1. RR of 12 
2. IV morphine 2mg every 4 hours scheduled, prn available as needed for severe pain, dyspnea 3. IV ativan 1mg every 4 hours scheduled for anxiety, agitation, prn available for severe anxiety, agitation 4. IV toradol re-ordered for fever, checked temp 98.9 axillary 5. IV robinul 0.2mg as needed for secretions 6. Music playing at bedside, Spiritual Interventions: holy water and oil at bedside,  available 24 hours a day in hospital setting Psych/ Social/ Emotional Interventions: MSW working with family for pre-bereavement services to begin for wife and son. Care Coordination Needs: Reviewed with Dr. Jordana Miller Care plan and New Orders discussed / approved with Cyndy Martin MD 
 
Description History and Chart Review If this is initial GIP note must document RN assessment/MD communication in previous setting. Specifically document nursing/medication needs in last 24 hours to support GIP care Narrative History of last 24 hours that demonstrates care cannot be provided in another setting: 
Patient requiring IV medications, titration of medications, RN monitoring What has been done to control the patient's symptoms in the last 24 hours? IV medications, prn medications Does the patient currently require IV medications? yes Does the patient currently require scheduled medications? yes Does the patient currently require a PCA? no 
 
 List number of doses of PRN medications in last 24 hours: 
Medication 1: Morphine Number of doses: 1 Medication 2:  
Number of doses: 
 
Medication 3:  
Number of doses: Supporting documentation for GIP need for pain control: 
[] Frequent evaluation by a doctor, nurse practitioner, nurse  
[] Frequent medication adjustment   
[x] IVs that cannot be administered at home  
[] Aggressive pain management  
[] Complicated technical delivery of medications Supporting documentation for GIP need for symptom control: 
[]  Sudden decline necessitating intensive nursing intervention 
[]  Uncontrolled / intractable nausea or vomiting  
[]  Pathological fractures 
[]  Advanced open wounds requiring frequent skilled care 
[] Unmanageable respiratory distress 
[] New or worsening delirium  
[] Delirium with behavior issues: Is 24 hour caregiver present due to safety concerns with agitation? (yes/no) [] Imminent death  with skilled nursing needs documented above DISCHARGE PLANNING Daily discharge planning required for GIP 1. Discharge Plan: will likely pass at [facility] but if he improves, he will return to his LTC facility 2. Patient/Family teaching: end of life signs and symptoms 3. Response to patient/family teaching: reviewed with family ASSESSMENT   
KARNOFSKY: 10 Prognosis estimated based on 12/14/20 clinical assessment is:  
[] Few to Many Hours [x] Hours to Days  
[] Few to Many Days  
[] Days to Weeks  
[] Few to Many Weeks  
[] Weeks to Months  
[] Few to Many Months Quality Measure: Patient self-reports:  [] Yes    [x] No 
 
ESAS:  
 
LAST BM: 12/12/2020 CLINICAL INFORMATION Patient Vitals for the past 12 hrs: 
 Temp Pulse Resp BP SpO2  
12/14/20 1425 97.7 °F (36.5 °C) 81 16 95/69 92 % 12/14/20 0916 97.2 °F (36.2 °C) 89 12 116/79 91 % Currently this patient has: 
[] Supplemental O2 [x] IV [x] PICC- quad lumen IJ     
[] PORT  
[] NG Tube   
[] PEG Tube  
[] Ostomy [x] Cortez draining doris urine 
[] Other:  
 
SIGNS/PHYSICAL FINDINGS Skin (including wound): 
[] Warm, dry, supple, intact and color normal for race [x] Warm  
[x] Dry  
[] Cool    
[] Clammy [x] Diaphoretic Turgor 
 [] Normal 
 [x] Decreased Color: 
 [] Pink [x] Pale 
 [] Cyanotic 
 [] Erythema 
 [] Jaundice [] Normal for Race 
[]  Wounds: 
 
Neuro: 
[] Lethargy 
[] Restlessness / agitation 
[] Confusion / delirium 
[] Hallucinations 
[] Responds to maximal stimulation 
[x] Unresponsive 
[] Seizures Cardiac: 
[x] Dyspnea on Exertion 
[] JVD [] Murmur 
[] Palpitations [] Hypotension 
[] Hypertension 
[] Tachycardia [] Bradycardia [x] Irregular HR [x] Pulses Decreased 
[] Pulses Absent 
[] Edema:       (Location, Grade and Pitting) [] Mottling:      (Location) Respiratory: 
Breath sounds:  
 [x] Diminished 
 [] Wheeze 
 [] Rhonchi 
 [] Rales  
[] Even and unlabored 
[x] Labored:  22- very shallow         
[] Cough 
 [] Non Productive 
 [] Productive 
  [] Description:          
[] Deep suctioned  
[] O2 at ___ LPM 
[] High flow oxygen greater than 10 LPM 
[] Bi-Pap GI [x] Abdomen (describe) soft 
[] Ascites 
[] Nausea 
[] Vomiting 
[x] Incontinent of bowels [x] Bowel sounds (yes/no) [] Diarrhea 
[] Constipation (see above including last bowel movement) [] Checked for impaction 
[x] Last BM 12/12/2020 Nutrition Diet:NPO Appetite:  
[] Good  
[] Fair  
[] Poor  
[] Tube Feeding  
[] Voiding 
[] Incontinent [x] Cortez Musculoskeletal 
[] Balance/[place] Unsteady [x] Weak Strength:  
 [] Normal  
 [] Limited [x] Decreasing Activities:  
 [] Up as tolerated 
 [x] Bedridden  
 [] Specify: 
 
SAFETY [] 24 hr. Caregiver [x] Side rails ? [x] Hospital bed  
[] Reviewed Falls & Safety ALLERGIES AND MEDICATIONS Allergies: Allergies Allergen Reactions  Alprazolam Rash Current Facility-Administered Medications Medication Dose Route Frequency  ketorolac (TORADOL) injection 15 mg  15 mg IntraVENous Q8H PRN  
 LORazepam (ATIVAN) injection 1 mg  1 mg IntraVENous Q15MIN PRN  
 acetaminophen (TYLENOL) suppository 650 mg  650 mg Rectal Q4H PRN  
 glycopyrrolate (ROBINUL) injection 0.2 mg  0.2 mg IntraVENous Q4H PRN  
 bisacodyL (DULCOLAX) suppository 10 mg  10 mg Rectal DAILY PRN  
 morphine injection 2 mg  2 mg IntraVENous Q15MIN PRN  
 sodium chloride (NS) flush 5 mL  5 mL InterCATHeter Q24H  
 morphine injection 2 mg  2 mg IntraVENous Q4H  
 LORazepam (ATIVAN) injection 1 mg  1 mg IntraVENous Q4H Visit Time In: 3:00 Visit Time Out: 3:10

## 2020-12-14 NOTE — PROGRESS NOTES
[address] visit. Mr. Ayla Sheikh is unable to receive communion at this time due to medical restrictions. Prayer for spiritual communion offered outside his room. KHADIJAH Lowry, RN, ACSW, LCSW  Page:  [phone]

## 2020-12-15 NOTE — PROGRESS NOTES
Bedside shift change report given to [address] [place] [address] (oncoming nurse) by Bradley Santo RN (offgoing nurse). Report included the following information SBAR, Kardex, MAR and Recent Results.

## 2020-12-15 NOTE — PROGRESS NOTES
Problem: Risk for Spread of Infection Goal: Prevent transmission of infectious organism to others Description: Prevent the transmission of infectious organisms to other patients, staff members, and visitors. Outcome: Progressing Towards Goal 
  
Problem: Patient Education:  Go to Education Activity Goal: Patient/Family Education Outcome: Progressing Towards Goal 
  
Problem: Pain Goal: *Control of acute pain Outcome: Progressing Towards Goal 
  
Problem: Falls - Risk of 
Goal: *Absence of Falls Description: Document Lemon Pollo Fall Risk and appropriate interventions in the flowsheet. Outcome: Progressing Towards Goal 
Note: Fall Risk Interventions: 
Mobility Interventions: Communicate number of staff needed for ambulation/transfer Mentation Interventions: Adequate sleep, hydration, pain control, Evaluate medications/consider consulting pharmacy Medication Interventions: Evaluate medications/consider consulting pharmacy Elimination Interventions: Toileting schedule/hourly rounds History of Falls Interventions: Evaluate medications/consider consulting pharmacy Problem: Pressure Injury - Risk of 
Goal: *Prevention of pressure injury Description: Document Sunny Scale and appropriate interventions in the flowsheet. Outcome: Progressing Towards Goal 
Note: Pressure Injury Interventions: 
Sensory Interventions: Float heels, Keep linens dry and wrinkle-free, Minimize linen layers Moisture Interventions: Apply protective barrier, creams and emollients, Absorbent underpads Activity Interventions: Pressure redistribution bed/mattress(bed type) Mobility Interventions: Pressure redistribution bed/mattress (bed type), HOB 30 degrees or less, Float heels Nutrition Interventions: (NPO) Friction and Shear Interventions: Apply protective barrier, creams and emollients, Minimize layers, HOB 30 degrees or less Problem: Emotional Support Needs Goal: Patient/family is receiving emotional support Description: Antoine Juarez, son Flaca Portillo and family will have emotional support and supportive counseling in coping with pts EOL due to PD within 5 days.  
 
 
Outcome: Progressing Towards Goal

## 2020-12-15 NOTE — HOSPICE
Patient received facetime video with family today. I also called and spoke with Teri Jillian patient's sister for an update and then called Radha Gomes Sr to update on plan of care and patient's condition. Family appreciative of the efforts here in the hospital and the hospice team. 
 
Thank you, 
 
Chloe Maravilla, RN [employer] [phone] Mobile

## 2020-12-15 NOTE — PROGRESS NOTES
Bedside and Verbal shift change report given to Meagan Dowell RN (oncoming nurse) by DARINEL Flores (offgoing nurse). Report included the following information SBAR, Kardex and Intake/Output.

## 2020-12-15 NOTE — HOSPICE
[facility] Good Help to Those in Need 
[phone] GIP Daily Nursing Note Patient Name: Robert Torre YOB: 1955 Age: 76 y.o. Date of Visit: 12/15/20 Facility of Care: [address] [place] [address] Patient Room: 215/01 Hospice Attending: Reese Feliciano MD 
Hospice Diagnosis: Parkinson's disease ([address] Mauro Sol Level of Care: GIP Current GIP Symptoms 1. Pain 2. Dyspnea 3. Anxiety 4. fevers ASSESSMENT & PLAN Must update Plan of Care including visit frequencies for IDT members 1. Changed from IV morphine to IV dilaudid 1mg every 4 hours with prn available every 15 minutes for pain, dyspnea 2. IV ativan 1mg every 4 hours scheduled with prn available every 15 minutes as needed for severe anxiety, agitation 3. IV robinul 0.2mg every 4 hours as needed for secretions 4. IV toradol and tylenol suppository for fevers 5. Turn and reposition patient every 4 hours, oral care as tolerated, boyle care per protocol, RN staff to manage IJ.  
6. Support family- facetime today with family in [facility], reviewed plan of care with \[facility] [place] [facility]\"" and her sister Raquel Lebron who were at bedside Spiritual Interventions: holy water and oils at bedside, prayers have been said outside of door Psych/ Social/ Emotional Interventions: support given to family, Facetime with 80 yr old father yesterday and today. Care Coordination Needs: Reviewed plan of care with Dr. Eve Ledbetter and family Care plan and New Orders discussed / approved with Dr. vEe Ledbetter MD. Description History and Chart Review If this is initial GIP note must document RN assessment/MD communication in previous setting. Specifically document nursing/medication needs in last 24 hours to support GIP care Narrative History of last 24 hours that demonstrates care cannot be provided in another setting: 
Patient requiring IV medications, titration of medications, too unstable for transport What has been done to control the patient's symptoms in the last 24 hours? IV medications, prn medications Does the patient currently require IV medications? yes Does the patient currently require scheduled medications? yes Does the patient currently require a PCA? no 
 
List number of doses of PRN medications in last 24 hours: 
Medication 1:ativan Number of doses:1 Medication 2:  
Number of doses: 
 
Medication 3:  
Number of doses: Supporting documentation for GIP need for pain control: 
[x] Frequent evaluation by a doctor, nurse practitioner, nurse  
[x] Frequent medication adjustment   
[x] IVs that cannot be administered at home  
[] Aggressive pain management  
[] Complicated technical delivery of medications Supporting documentation for GIP need for symptom control: 
[x]  Sudden decline necessitating intensive nursing intervention 
[]  Uncontrolled / intractable nausea or vomiting  
[]  Pathological fractures 
[]  Advanced open wounds requiring frequent skilled care [x] Unmanageable respiratory distress 
[] New or worsening delirium  
[] Delirium with behavior issues: Is 24 hour caregiver present due to safety concerns with agitation? (yes/no) [] Imminent death  with skilled nursing needs documented above DISCHARGE PLANNING Daily discharge planning required for GIP 1. Discharge Plan: patient appears imminent, will likely pass at [facility] 2. Patient/Family teaching: end of life signs and symptoms 3. Response to patient/family teaching: educated on breathing changes ASSESSMENT   
KARNOFSKY: 10 Prognosis estimated based on 12/15/20 clinical assessment is:  
[x] Few to Many Hours [] Hours to Days  
[] Few to Many Days  
[] Days to Weeks  
[] Few to Many Weeks  
[] Weeks to Months  
[] Few to Many Months Quality Measure: Patient self-reports:  [] Yes    [x] No 
 
ESAS:  
Time of Assessment: 1200 Pain (1-10):7 Fatigue (1-10): 10 Shortness of breath (1-10):10 
 Nausea (1-10): 5 Appetite (1-10): 10 Anxiety: (1-10): 8 Depression: (1-10): 8 Well-being: (1-10): 8 Constipation: x_ Yes  _ No 
LAST BM: 12/12/2020 CLINICAL INFORMATION Patient Vitals for the past 12 hrs: 
 Temp Pulse Resp BP SpO2  
12/15/20 1424 99.3 °F (37.4 °C) 89 18  93 % 12/15/20 0844 99.8 °F (37.7 °C) 97 18 119/79 93 % Currently this patient has: 
[] Supplemental O2 [x] IV [x] PICC [] PORT  
[] NG Tube   
[] PEG Tube  
[] Ostomy    
[x] Cortez draining doris urine 
[] Other:  
 
SIGNS/PHYSICAL FINDINGS Skin (including wound): 
[] Warm, dry, supple, intact and color normal for race [x] Warm  
[x] Dry  
[] Cool    
[] Clammy      
[] Diaphoretic Turgor 
 [] Normal 
 [x] Decreased Color: 
 [] Pink 
 [] Pale 
 [] Cyanotic 
 [] Erythema [x] Jaundice [] Normal for Race 
[]  Wounds: 
 
Neuro: 
[] Lethargy 
[] Restlessness / agitation 
[] Confusion / delirium 
[] Hallucinations 
[] Responds to maximal stimulation 
[x] Unresponsive 
[] Seizures Cardiac: 
[x] Dyspnea on Exertion 
[] JVD [] Murmur 
[] Palpitations [] Hypotension 
[] Hypertension 
[x] Tachycardia [] Bradycardia [x] Irregular HR [x] Pulses Decreased 
[] Pulses Absent 
[] Edema:       (Location, Grade and Pitting) [] Mottling:      (Location) Respiratory: 
Breath sounds:  
 [] Diminished 
 [] Wheeze [x] Rhonchi 
 [] Rales  
[] Even and unlabored 
[x] Labored: 20 
[] Cough 
 [] Non Productive 
 [] Productive 
  [] Description:          
[] Deep suctioned  
[] O2 at ___ LPM 
[] High flow oxygen greater than 10 LPM 
[] Bi-Pap GI [x] Abdomen (describe) [] Ascites 
[] Nausea 
[] Vomiting 
[x] Incontinent of bowels [x] Bowel sounds (yes/no) [] Diarrhea 
[] Constipation (see above including last bowel movement) [] Checked for impaction 
[x] Last BM 12/12/2020 Nutrition Diet:NPO__________ Appetite:  
[] Good  
[] Fair  
[] Poor  
[] Tube Feeding  
[] Voiding 
[] Incontinent [x] Cortez Musculoskeletal 
[] Balance/[place] Unsteady [x] Weak Strength:  
 [] Normal  
 [] Limited [x] Decreasing Activities:  
 [] Up as tolerated 
 [x] Bedridden  
 [] Specify: 
 
SAFETY [] 24 hr. Caregiver [x] Side rails ? [x] Hospital bed  
[] Reviewed Falls & Safety ALLERGIES AND MEDICATIONS Allergies: Allergies Allergen Reactions  Alprazolam Rash Current Facility-Administered Medications Medication Dose Route Frequency  HYDROmorphone (PF) (DILAUDID) injection 1 mg  1 mg IntraVENous Q4H  
 HYDROmorphone (PF) (DILAUDID) injection 1 mg  1 mg IntraVENous Q15MIN PRN  
 LORazepam (ATIVAN) injection 1 mg  1 mg IntraVENous Q15MIN PRN  
 acetaminophen (TYLENOL) suppository 650 mg  650 mg Rectal Q4H PRN  
 glycopyrrolate (ROBINUL) injection 0.2 mg  0.2 mg IntraVENous Q4H PRN  
 bisacodyL (DULCOLAX) suppository 10 mg  10 mg Rectal DAILY PRN  
 sodium chloride (NS) flush 5 mL  5 mL InterCATHeter Q24H  
 LORazepam (ATIVAN) injection 1 mg  1 mg IntraVENous Q4H Visit Time In: 1200 Visit Time SHAYLA:[id number]

## 2020-12-15 NOTE — HOSPICE
Joel Rosales LCSW note: This LCSW spoke with pts wife [facility] via phone as she had left the hospital. LCSW addressed her concerns about not knowing if she will be at bedside when her  passes to be able to say goodbye. LCSW and [facility] talked about all the things she is doing being at bedside, her role as caregiver, and trusting in God. We also talked about the things that are a mystery right now, the timing if his death, the uncertainties of when he will pass and God and his timing. Wife is a woman of ollie, she noted she is trusting in God and trusting in her  when that time comes. LCSW provided affirmation for her on walking in ollie and trusting Peoa Aranza and her Father in [facility] in difficult moments. LCSW provided validation of her care for pt, the life changing experience this has been for her and her son. Juan Renee talked about how she feels she and her son are being lead in careers because of 's journey. LCSW provided support and affirmation.

## 2020-12-16 NOTE — PROGRESS NOTES
This LCSW spoke with pts wife [facility] via phone as she had left the hospital. LCSW addressed her concerns about not knowing if she will be at bedside when her  passes to be able to say goodbye. LCSW and [facility] talked about all the things she is doing being at bedside, her role as caregiver, and trusting in God. We also talked about the things that are a mystery right now, the timing if his death, the uncertainties of when he will pass and God and his timing. Wife is a woman of ollie, she noted she is trusting in God and trusting in her  when that time comes. LCSW provided affirmation for her on walking in [place] and trusting Satya Walden and her Father in [facility] in difficult moments. LCSW provided validation of her care for pt, the life changing experience this has been for her and her son. Cesar Vail talked about how she feels she and her son are being lead in careers because of 's journey. LCSW provided support and affirmation. LCSW provided grief support for wife in processing feelings of anticipatory grief and relational loss.

## 2020-12-16 NOTE — HOSPICE
[facility] Good Help to Those in Need 
[phone] GIP Daily Nursing Note Patient Name: Tito Marie YOB: 1955 Age: 76 y.o. Date of Visit: 12/16/20 Facility of Care: [facility] Patient Room: 215/01 Hospice Attending: Robinson Spears MD 
Hospice Diagnosis: Parkinson's disease ([address] Shaye York Level of Care: GIP Current GIP Symptoms 1. Pain 2. Dyspnea 3. Anxiety 4. fevers ASSESSMENT & PLAN Must update Plan of Care including visit frequencies for IDT members 1. Changed from IV morphine to IV dilaudid 1mg every 4 hours with prn available every 15 minutes for pain, dyspnea 2. IV ativan 1mg every 4 hours scheduled with prn available every 15 minutes as needed for severe anxiety, agitation 3. IV robinul 0.2mg every 4 hours as needed for secretions 4. IV toradol and tylenol suppository for fevers 5. Turn and reposition patient every 4 hours, oral care as tolerated, boyle care per protocol, RN staff to manage IJ.  
6. Support family. Spiritual Interventions: holy water and oils at bedside, prayers have been said outside of door Psych/ Social/ Emotional Interventions: support given to family. Phone call to father today in [place], left message on  to call back. Care Coordination Needs: Reviewed plan of care with Dr. Lizeth Monahan and family Care plan and New Orders discussed / approved with Dr. Lizeth Monahan MD. Description History and Chart Review If this is initial GIP note must document RN assessment/MD communication in previous setting. Specifically document nursing/medication needs in last 24 hours to support GIP care Narrative History of last 24 hours that demonstrates care cannot be provided in another setting: 
Patient requiring IV medications, titration of medications, too unstable for transport What has been done to control the patient's symptoms in the last 24 hours? IV medications, prn medications Does the patient currently require IV medications? yes Does the patient currently require scheduled medications? yes Does the patient currently require a PCA? no 
 
List number of doses of PRN medications in last 24 hours: 
Medication 1:Dilaudid Number of doses:1 Medication 2: Ativan Number of doses: 1 Medication 3:  
Number of doses: Supporting documentation for GIP need for pain control: 
[x] Frequent evaluation by a doctor, nurse practitioner, nurse  
[x] Frequent medication adjustment   
[x] IVs that cannot be administered at home  
[] Aggressive pain management  
[] Complicated technical delivery of medications Supporting documentation for GIP need for symptom control: 
[x]  Sudden decline necessitating intensive nursing intervention 
[]  Uncontrolled / intractable nausea or vomiting  
[]  Pathological fractures 
[]  Advanced open wounds requiring frequent skilled care [x] Unmanageable respiratory distress 
[] New or worsening delirium  
[] Delirium with behavior issues: Is 24 hour caregiver present due to safety concerns with agitation? (yes/no) [] Imminent death  with skilled nursing needs documented above DISCHARGE PLANNING Daily discharge planning required for GIP 1. Discharge Plan: patient appears imminent, will likely pass at [facility] 2. Patient/Family teaching: end of life signs and symptoms 3. Response to patient/family teaching: educated on breathing changes ASSESSMENT   
KARNOFSKY: 10 Prognosis estimated based on 12/16/20 clinical assessment is:  
[x] Few to Many Hours [] Hours to Days  
[] Few to Many Days  
[] Days to Weeks  
[] Few to Many Weeks  
[] Weeks to Months  
[] Few to Many Months Quality Measure: Patient self-reports:  [] Yes    [x] No 
 
ESAS:  
Time of Assessment: 1200 Pain (1-10):7 Fatigue (1-10): 10 Shortness of breath (1-10):10 Nausea (1-10): 5 Appetite (1-10): 10 Anxiety: (1-10): 8 Depression: (1-10): 8 Well-being: (1-10): 8 Constipation: x_ Yes  _ No 
LAST BM: 12/12/2020 CLINICAL INFORMATION Patient Vitals for the past 12 hrs: 
 Temp Pulse Resp BP SpO2  
12/16/20 0909 (!) 102.2 °F (39 °C) (!) 115 18 103/69 90 % Currently this patient has: 
[] Supplemental O2 [x] IV [x] PICC [] PORT  
[] NG Tube   
[] PEG Tube  
[] Ostomy    
[x] Cortez draining doris urine 
[] Other:  
 
SIGNS/PHYSICAL FINDINGS Skin (including wound): 
[] Warm, dry, supple, intact and color normal for race [x] Warm  
[x] Dry  
[] Cool    
[] Clammy      
[] Diaphoretic Turgor 
 [] Normal 
 [x] Decreased Color: 
 [] Pink 
 [] Pale 
 [] Cyanotic 
 [] Erythema [x] Jaundice [] Normal for Race 
[]  Wounds: 
 
Neuro: 
[] Lethargy 
[] Restlessness / agitation 
[] Confusion / delirium 
[] Hallucinations 
[] Responds to maximal stimulation 
[x] Unresponsive 
[] Seizures Cardiac: 
[x] Dyspnea on Exertion 
[] JVD [] Murmur 
[] Palpitations [] Hypotension 
[] Hypertension 
[x] Tachycardia [] Bradycardia [x] Irregular HR [x] Pulses Decreased 
[] Pulses Absent 
[] Edema:       (Location, Grade and Pitting) [] Mottling:      (Location) Respiratory: 
Breath sounds:  
 [] Diminished 
 [] Wheeze [x] Rhonchi 
 [] Rales  
[] Even and unlabored 
[x] Labored: 20 
[] Cough 
 [] Non Productive 
 [] Productive 
  [] Description:          
[] Deep suctioned  
[] O2 at ___ LPM 
[] High flow oxygen greater than 10 LPM 
[] Bi-Pap GI [x] Abdomen (describe) [] Ascites 
[] Nausea 
[] Vomiting 
[x] Incontinent of bowels [x] Bowel sounds (yes/no) [] Diarrhea 
[] Constipation (see above including last bowel movement) [] Checked for impaction 
[x] Last BM 12/12/2020 Nutrition Diet:NPO__________ Appetite:  
[] Good  
[] Fair  
[] Poor  
[] Tube Feeding  
[] Voiding 
[] Incontinent [x] Cortez Musculoskeletal 
[] Balance/[place] Unsteady [x] Weak Strength:  
 [] Normal  
 [] Limited [x] Decreasing Activities: [] Up as tolerated 
 [x] Bedridden  
 [] Specify: 
 
SAFETY [] 24 hr. Caregiver [x] Side rails ? [x] Hospital bed  
[] Reviewed Falls & Safety ALLERGIES AND MEDICATIONS Allergies: Allergies Allergen Reactions  Alprazolam Rash Current Facility-Administered Medications Medication Dose Route Frequency  HYDROmorphone (PF) (DILAUDID) injection 1 mg  1 mg IntraVENous Q4H  
 HYDROmorphone (PF) (DILAUDID) injection 1 mg  1 mg IntraVENous Q15MIN PRN  
 LORazepam (ATIVAN) injection 1 mg  1 mg IntraVENous Q15MIN PRN  
 acetaminophen (TYLENOL) suppository 650 mg  650 mg Rectal Q4H PRN  
 glycopyrrolate (ROBINUL) injection 0.2 mg  0.2 mg IntraVENous Q4H PRN  
 bisacodyL (DULCOLAX) suppository 10 mg  10 mg Rectal DAILY PRN  
 sodium chloride (NS) flush 5 mL  5 mL InterCATHeter Q24H  
 LORazepam (ATIVAN) injection 1 mg  1 mg IntraVENous Q4H Visit Time In: 1200 Visit Time [id number]

## 2020-12-16 NOTE — PROGRESS NOTES
[facility] Good Help to Those in Need 
[phone] Patient Name: Tex Chase. YOB: 1955 Date of Provider Hospice Visit: 12/15/20 LATE NOTE Level of Care:   [x] General Inpatient (GIP)    [] Routine   [] Respite Current Location of Care: 
[x] [facility] [] [facility] [] [facility] [] [address] [] [facility], patient referred from: 
[] [facility] [] [facility] [] [facility] [] [address] [] Home [] Other:  
 
Date of Original Hospice Admission: 12/11/2020 Hospice Medical Director at time of admission: Booker Caballero MD 
 
Principle Hospice Diagnosis: Parkinson's disease Diagnoses RELATED to the terminal prognosis:  
COVID-19 Other Diagnoses: Sarwat Chase. is a 76y.o. year old who was admitted to [facility]. He has Parkinson's disease and was a resident of [facility]. He was admitted to [facility] on 12/3/2020 with AHRF and sepsis. CXR demonstrated airspace disease of the left lung. He was found to be positive for COVID-19. He did not recover despite treatment. He was unable to take his chronic Parkinson's medications d/t inability to tolerate oral intake. The family chose to pursue comfort measures with the support of Hospice. He is being admitted to inpatient hospice care on 12/11 for worsening pain and restlessness. On my 12/11 exam patient is unresponsive with anticipated prognosis of hours to days. Objective information:  
12/6/2020 CXR: 
IMPRESSION:  
Worsening airspace disease throughout the left mid and lower lung. HOSPICE ASSESSMENT Active Symptoms and Issues: 
-Generalized pain 
-Anxiety/agitation/restlessness 
-Irregular/breathing /periods of apnea  
-Dysphagia 
-Unresponsiveness 
-Hospice care PLAN 1. GIP level of care needed secondary to frequent nursing assessment, IV medication management, patient unsafe to transition to sublingual and/or oral medication at this time. 2. Pt is at high risk of rapid decline and death due to terminal disease process. 3. For pain and dyspnea control, he is on scheduled morphine 2 mg IV every 4 hours routinely and prn pain, air hunger, not used any prn today. Will rotate to dilaudid for better SOB/pain to 1 mg IV every 4 hours scheduled 4. For restlessness, on scheduled ativan 1 mg IV every 4 hours routinely and prn anxiety, agitation, restlessness. No as needed doses 5. We will have glycopyrrolate 0.2 mg IV available prn to help prevent formation of new secretions. Recommend  Gentle anterior suction of secretions okay (i.e. suction around lips/teeth). Recommend avoiding deep suction to prevent irritation, pain, bleeding. 6. Prn bisacodyl for constipation 7. Prn tylenol and/or toradol for fever. 8.  Psychosocial-no family at the bedside. Brian Reeves able to talk with wife earlier and sister 9. Reviewed plan with bedside nurse, hospice liaison, . 10. Will monitor sxs and prn use for need to schedule/titrate medications further 11.  and SW to support family needs. 12. Disposition: anticipate that pt will decline and die in the coming hours-days without stabilizing enough for care in the home setting 13. Hospice plan of care discussed with hospice idt, patient's wife, sister-in-law, sister, and father. 14. Prognosis estimated based on today's clinical assessment is  
[x] Hours to Days   
[] Days to Weeks   
[] Other: 
 
Plan of care communicated with bed side Rn and hospice Rn Brian Reeves. GOALS OF CARE Patient/Medical POA stated Goal of Care: optimize patient comfort 
 
[] I have reviewed and/or updated ACP information in the Advance Care Planning Navigator. This information is available in the [address] link in the patient's chart header. Primary Medical Decision Maker:   Primary Decision Maker: Vika Talamantes - Spouse - [phone] Resuscitation Status: DNR 
 If DNR is there a Durable DNR on file? : [] Yes [] No (If no, complete Durable DNR) HISTORY History obtained from: chart review, hospice idt, patient's family CHIEF COMPLAINT: patient cannot give as unresponsive The patient is:  
[] Verbal 
[] Nonverbal 
[x] Unresponsive HPI/SUBJECTIVE:   
12/11: patient unresponsive. Multiple prns morphine and ativan needed today for pain, dyspnea, restlessness. Wife's goal is for comfort with hospice. 12/12; patient remains unresponsive . 12/14patient is unresponsive. No as needed doses needed. Currently appears comfortable 12/15-Patient with slight SOB. No family at the bedside REVIEW OF SYSTEMS The following systems were: [] reviewed  [x] unable to be reviewed as pt is unresponsive Positive ROS include bold items: 
Constitutional: fatigue, weakness, in pain, short of breath Ears/nose/mouth/throat: increased airway secretions Respiratory:shortness of breath, wheezing Gastrointestinal:poor appetite, nausea, vomiting, abdominal pain, constipation, diarrhea Musculoskeletal:pain, deformities, swelling legs Neurologic:confusion, hallucinations, weakness Psychiatric:anxiety, feeling depressed, poor sleep Endocrine: increased thirst, increased hunger Adult Non-Verbal Pain Assessment Score: 2 Face 
[] 0   No particular expression or smile 
[x] 1   Occasional grimace, tearing, frowning, wrinkled forehead 
[] 2   Frequent grimace, tearing, frowning, wrinkled forehead Activity (movement) [] 0   Lying quietly, normal position 
[x] 1   Seeking attention through movement or slow, cautious movement 
[] 2   Restless, excessive activity and/or withdrawal reflexes Guarding 
[x] 0   Lying quietly, no positioning of hands over areas of body 
[] 1   Splinting areas of the body, tense 
[] 2   Rigid, stiff Physiology (vital signs) [x] 0   Stable vital signs [] 1   Change in any of the following: SBP > 20mm Hg; HR > 20/minute [] 2   Change in any of the following: SBP > 30mm Hg; HR > 25/minute Respiratory [x] 0   Baseline RR/SpO2, compliant with ventilator 
[] 1   RR > 10 above baseline, or 5% drop SpO2, mild asynchrony with ventilator 
[] 2   RR > 20 above baseline, or 10% drop SpO2, asynchrony with ventilator FUNCTIONAL ASSESSMENT Palliative Performance Scale (PPS): 10 PSYCHOSOCIAL/SPIRITUAL ASSESSMENT Active Problems: 
  Parkinson's disease ([address] (12/11/2020) Past Medical History:  
Diagnosis Date  Asthma  [facility] lewy body dementia  Musculoskeletal disorder  Stool color black Past Surgical History:  
Procedure Laterality Date  HX OTHER SURGICAL  2012  
 knee  HX OTHER SURGICAL  2015  
 neck Social History Tobacco Use  Smoking status: Never Smoker  Smokeless tobacco: Never Used Substance Use Topics  Alcohol use: Not Currently Frequency: Never Family History Problem Relation Age of Onset  Dementia Mother  Heart Disease Mother  Cancer Father  Stroke Brother  Stroke Other Allergies Allergen Reactions  Alprazolam Rash Current Facility-Administered Medications Medication Dose Route Frequency  HYDROmorphone (PF) (DILAUDID) injection 1 mg  1 mg IntraVENous Q4H  
 HYDROmorphone (PF) (DILAUDID) injection 1 mg  1 mg IntraVENous Q15MIN PRN  
 LORazepam (ATIVAN) injection 1 mg  1 mg IntraVENous Q15MIN PRN  
 acetaminophen (TYLENOL) suppository 650 mg  650 mg Rectal Q4H PRN  
 glycopyrrolate (ROBINUL) injection 0.2 mg  0.2 mg IntraVENous Q4H PRN  
 bisacodyL (DULCOLAX) suppository 10 mg  10 mg Rectal DAILY PRN  
 sodium chloride (NS) flush 5 mL  5 mL InterCATHeter Q24H  
 LORazepam (ATIVAN) injection 1 mg  1 mg IntraVENous Q4H PHYSICAL EXAM  
 
Wt Readings from Last 3 Encounters:  
12/11/20 74.3 kg (163 lb 12.8 oz) 12/08/20 74.3 kg (163 lb 11.2 oz) 08/27/20 70.8 kg (156 lb) Visit Vitals /73 (BP 1 Location: Right arm, BP Patient Position: At rest) Pulse 84 Temp 98 °F (36.7 °C) Resp 18 Ht 5' 7\" (1.702 m) Wt 74.3 kg (163 lb 12.8 oz) SpO2 90% BMI 25.66 kg/m² Supplemental O2  [] Yes  [x] NO Last bowel movement:  
 
Currently this patient has: 
[] Peripheral IV [] PICC  [] PORT [] ICD [] Cortez Catheter [] NG Tube   [] PEG Tube   
[] Rectal Tube [] Drain 
[] Other:  
 
General unresponsive, slight increased WOB HEENTdry oral mucosa Lungsvery few scattered secretions noted, diminished at the bases Cardiovascularslightly tachycardic Abdomensoft, nontender Extremitiesno clubbing, cyanosis, edema Skin warm to the touch Neurounresponsive. Pertinent Lab and or Imaging Tests: 
Lab Results Component Value Date/Time Sodium 141 12/07/2020 01:02 AM  
 Potassium 3.4 (L) 12/07/2020 01:02 AM  
 Chloride 109 (H) 12/07/2020 01:02 AM  
 CO2 25 12/07/2020 01:02 AM  
 Anion gap 7 12/07/2020 01:02 AM  
 Glucose 98 12/07/2020 01:02 AM  
 BUN 22 (H) 12/07/2020 01:02 AM  
 Creatinine 0.63 (L) 12/07/2020 01:02 AM  
 BUN/Creatinine ratio 35 (H) 12/07/2020 01:02 AM  
 GFR est AA >60 12/07/2020 01:02 AM  
 GFR est non-AA >60 12/07/2020 01:02 AM  
 Calcium 8.0 (L) 12/07/2020 01:02 AM  
 
Lab Results Component Value Date/Time Protein, total 6.6 12/03/2020 01:34 AM  
 Albumin 3.1 (L) 12/07/2020 12:56 AM  
 
   
 
Ben Tavarez MD 
[employer]

## 2020-12-16 NOTE — PROGRESS NOTES
[facility] visit. Mr. Yenny Cordova is unable to receive communion due to medical restrictions. Prayer for spiritual communion offered outside his room. KHADIJAH Jaramillo, RN, ACSW, LCSW  Page:  [phone]

## 2020-12-16 NOTE — PROGRESS NOTES
[facility] [facility] Good Help to Those in Need 
[phone] Patient Name: Harleen Villarreal YOB: 1955 Date of Provider Hospice Visit: 12/16/20 Level of Care:   [x] General Inpatient (GIP)    [] Routine   [] Respite Current Location of Care: 
[x] [facility] [] [facility] [] [address] [facility] [] [facility] [] [facility]ice [facility], patient referred from: 
[] [facility] [] [facility] [] [address] [facility] [] [facility] [] Home [] Other:  
 
Date of Original Hospice Admission: 12/11/2020 Hospice Medical Director at time of admission: Alejandro Zhong MD 
 
Principle Hospice Diagnosis: Parkinson's disease Diagnoses RELATED to the terminal prognosis:  
COVID-19 Other Diagnoses: Chrystal White. is a 76y.o. year old who was admitted to [facility]. He has Parkinson's disease and was a resident of [facility]. He was admitted to [facility] on 12/3/2020 with AHRF and sepsis. CXR demonstrated airspace disease of the left lung. He was found to be positive for COVID-19. He did not recover despite treatment. He was unable to take his chronic Parkinson's medications d/t inability to tolerate oral intake. The family chose to pursue comfort measures with the support of Hospice. He is being admitted to inpatient hospice care on 12/11 for worsening pain and restlessness. On my 12/11 exam patient is unresponsive with anticipated prognosis of hours to days. Objective information:  
12/6/2020 CXR: 
IMPRESSION:  
Worsening airspace disease throughout the left mid and lower lung. HOSPICE ASSESSMENT Active Symptoms and Issues: 
-Generalized pain 
-Anxiety/agitation/restlessness 
-Irregular/breathing /periods of apnea  
-Dysphagia 
-Unresponsiveness 
-Hospice care  PLAN  
 
 1. GIP level of care will continue. Patient not able to be transition to sublingual oral medication given his mental status and ongoing decline. He continues to require IV medication management for his symptoms. Patient is not safe to be transition home. Spent time talking with his wife today at the bedside. She is doing okay overall. 2. Pt is at high risk of rapid decline and death due to terminal disease process. 3. For pain and dyspnea control, rotated him to Dilaudid 1 mg IV every 4 hours scheduled given that he was showing evidence of shortness of breath and slight work of breathing yesterday. This seems to have helped his breathing as he appears much more relaxed today. 4. For restlessness, on scheduled ativan 1 mg IV every 4 hours routinely and prn anxiety, agitation, restlessness. No as needed doses 5. We will have glycopyrrolate 0.2 mg IV available prn to help prevent formation of new secretions. Recommend  Gentle anterior suction of secretions okay (i.e. suction around lips/teeth). Recommend avoiding deep suction to prevent irritation, pain, bleeding. 6. Prn bisacodyl for constipation 7. Prn tylenol and/or toradol for fever. 8.  Psychosocial-no family at the bedside. 9. Reviewed plan with bedside nurse, hospice liaison, . Once again spent time talking with his wife. She is very appreciative of the hospice team and thankful that he is here and comfortable 10. Will monitor sxs and prn use for need to schedule/titrate medications further 11.  and SW to support family needs. 12. Disposition: anticipate that pt will decline and die in the coming hours-days without stabilizing enough for care in the home setting 13. Hospice plan of care discussed with hospice idt, patient's wife, sister-in-law, sister, and father. 14. Prognosis estimated based on today's clinical assessment is  
[x] Hours to Days   
[] Days to Weeks   
[] Other: Plan of care communicated with bed side Rn and hospice Rn Marycarmen Mistry. GOALS OF CARE Patient/Medical POA stated Goal of Care: optimize patient comfort 
 
[] I have reviewed and/or updated ACP information in the Advance Care Planning Navigator. This information is available in the [address] link in the patient's chart header. Primary Medical Decision Maker:   Primary Decision Maker: Vika Talamantes - Spouse - [phone] Resuscitation Status: DNR If DNR is there a Durable DNR on file? : [] Yes [] No (If no, complete Durable DNR) HISTORY History obtained from: chart review, hospice idt, patient's family CHIEF COMPLAINT: patient cannot give as unresponsive The patient is:  
[] Verbal 
[] Nonverbal 
[x] Unresponsive HPI/SUBJECTIVE:   
12/11: patient unresponsive. Multiple prns morphine and ativan needed today for pain, dyspnea, restlessness. Wife's goal is for comfort with hospice. 12/12; patient remains unresponsive . 12/14patient is unresponsive. No as needed doses needed. Currently appears comfortable 12/15-Patient with slight SOB. No family at the bedside 12/16patient unresponsive, wife at the bedside playing [employer] music REVIEW OF SYSTEMS The following systems were: [] reviewed  [x] unable to be reviewed as pt is unresponsive Positive ROS include bold items: 
Constitutional: fatigue, weakness, in pain, short of breath Ears/nose/mouth/throat: increased airway secretions Respiratory:shortness of breath, wheezing Gastrointestinal:poor appetite, nausea, vomiting, abdominal pain, constipation, diarrhea Musculoskeletal:pain, deformities, swelling legs Neurologic:confusion, hallucinations, weakness Psychiatric:anxiety, feeling depressed, poor sleep Endocrine: increased thirst, increased hunger Adult Non-Verbal Pain Assessment Score: 0 Face [x] 0   No particular expression or smile 
[] 1   Occasional grimace, tearing, frowning, wrinkled forehead [] 2   Frequent grimace, tearing, frowning, wrinkled forehead Activity (movement) [x] 0   Lying quietly, normal position 
[] 1   Seeking attention through movement or slow, cautious movement 
[] 2   Restless, excessive activity and/or withdrawal reflexes Guarding 
[x] 0   Lying quietly, no positioning of hands over areas of body 
[] 1   Splinting areas of the body, tense 
[] 2   Rigid, stiff Physiology (vital signs) [x] 0   Stable vital signs [] 1   Change in any of the following: SBP > 20mm Hg; HR > 20/minute 
[] 2   Change in any of the following: SBP > 30mm Hg; HR > 25/minute Respiratory [x] 0   Baseline RR/SpO2, compliant with ventilator 
[] 1   RR > 10 above baseline, or 5% drop SpO2, mild asynchrony with ventilator 
[] 2   RR > 20 above baseline, or 10% drop SpO2, asynchrony with ventilator FUNCTIONAL ASSESSMENT Palliative Performance Scale (PPS): 10 PSYCHOSOCIAL/SPIRITUAL ASSESSMENT Active Problems: 
  [facility] (12/11/2020) Past Medical History:  
Diagnosis Date  Asthma  Dementia ([facility] lewy body dementia  Musculoskeletal disorder  Stool color black Past Surgical History:  
Procedure Laterality Date  HX OTHER SURGICAL  2012  
 knee  HX OTHER SURGICAL  2015  
 neck Social History Tobacco Use  Smoking status: Never Smoker  Smokeless tobacco: Never Used Substance Use Topics  Alcohol use: Not Currently Frequency: Never Family History Problem Relation Age of Onset  Dementia Mother  Heart Disease Mother  Cancer Father  Stroke Brother  Stroke Other Allergies Allergen Reactions  Alprazolam Rash Current Facility-Administered Medications Medication Dose Route Frequency  HYDROmorphone (PF) (DILAUDID) injection 1 mg  1 mg IntraVENous Q4H  
 HYDROmorphone (PF) (DILAUDID) injection 1 mg  1 mg IntraVENous Q15MIN PRN  
  LORazepam (ATIVAN) injection 1 mg  1 mg IntraVENous Q15MIN PRN  
 acetaminophen (TYLENOL) suppository 650 mg  650 mg Rectal Q4H PRN  
 glycopyrrolate (ROBINUL) injection 0.2 mg  0.2 mg IntraVENous Q4H PRN  
 bisacodyL (DULCOLAX) suppository 10 mg  10 mg Rectal DAILY PRN  
 sodium chloride (NS) flush 5 mL  5 mL InterCATHeter Q24H  
 LORazepam (ATIVAN) injection 1 mg  1 mg IntraVENous Q4H PHYSICAL EXAM  
 
Wt Readings from Last 3 Encounters:  
12/11/20 74.3 kg (163 lb 12.8 oz) 12/08/20 74.3 kg (163 lb 11.2 oz) 08/27/20 70.8 kg (156 lb) Visit Vitals /69 (BP 1 Location: Right arm, BP Patient Position: At rest) Pulse (!) 115 Temp (!) 102.2 °F (39 °C) Resp 18 Ht 5' 7\" (1.702 m) Wt 74.3 kg (163 lb 12.8 oz) SpO2 90% BMI 25.66 kg/m² Supplemental O2  [] Yes  [x] NO Last bowel movement:  
 
Currently this patient has: 
[] Peripheral IV [] PICC  [] PORT [] ICD [] Cortez Catheter [] NG Tube   [] PEG Tube   
[] Rectal Tube [] Drain 
[] Other:  
 
General unresponsive, no acute distress today HEENTdry oral mucosa Lungsvery few scattered secretions noted, diminished at the bases Cardiovascularregular rate and rhythm Abdomensoft, nontender Extremitiesno clubbing, cyanosis, edema Skin warm to the touch Neurounresponsive. Pertinent Lab and or Imaging Tests: 
Lab Results Component Value Date/Time Sodium 141 12/07/2020 01:02 AM  
 Potassium 3.4 (L) 12/07/2020 01:02 AM  
 Chloride 109 (H) 12/07/2020 01:02 AM  
 CO2 25 12/07/2020 01:02 AM  
 Anion gap 7 12/07/2020 01:02 AM  
 Glucose 98 12/07/2020 01:02 AM  
 BUN 22 (H) 12/07/2020 01:02 AM  
 Creatinine 0.63 (L) 12/07/2020 01:02 AM  
 BUN/Creatinine ratio 35 (H) 12/07/2020 01:02 AM  
 GFR est AA >60 12/07/2020 01:02 AM  
 GFR est non-AA >60 12/07/2020 01:02 AM  
 Calcium 8.0 (L) 12/07/2020 01:02 AM  
 
Lab Results Component Value Date/Time  Protein, total 6.6 12/03/2020 01:34 AM  
 Albumin 3.1 (L) 12/07/2020 12:56 AM  
 
   
 
Bladimir Vasquez MD 
[facility]

## 2020-12-16 NOTE — [facility] CHAPLAIN NOTES
Patient: Jaymie Barrera. Date: 12/16/20 Time: 2:28 PM 
 
[facility]  Notes This was an initial visit to assess needs and offer support. Introduced myself to wife at the door as patient is Covid -23. She thanked me for coming but noted no needs at this time. They are [employer] and prayers were offered in the hallway by our  who  to [facility]. Signed by: Lukasz Campbell

## 2020-12-17 NOTE — HOSPICE
T/C to patient's father and sister who both live in [place] [address] to update them on patient's status. Both verbalize understanding of patient's declining status and limited life expectancy. Both express gratitude for the call. Father requests that hospice call him when the patient expires. Sister reports that her sister in law has told her that when the patient expires, they will notify her. Zeny Barrett RN MSN [facility] Nurse Liaison [address] [place] [address] [phone] ( mobile) [phone] ( office)

## 2020-12-17 NOTE — PROGRESS NOTES
Requested by nurse, Vish Palencia, to provide support to wife of Mr Radames Lu in room 215. Met patient's wife, Azalea Sharif, in the doorway of patient's room (patient was COVID+). Provided pastoral presence and active listening as Azalea Sharif tearfully attempted to process her feelings about her 's critical condition. Acknowledged her feelings of grief and her concerns. Azalea Sharif requested that  keep family in prayer for strength; assured her of prayers on their behalf and of ongoing  availability for support. : Rev. Gloria Marquez. Karthik Moody; [facility], to contact [phone] [address] call: [phone]

## 2020-12-17 NOTE — [facility] IDG MASTER NOTE
[address] [facility] Date: 12/17/20 Time: 9:17 AM 
 
___________________ Patient: Angel Anderson. Coverage Information: 
   Payor: Samson Romero Plan: [place] MEDICARE PART A & B Subscriber ID: [id number] Phone Number:  
 
   Payor: [facility] MEDICAID Plan: [facility] Subscriber ID: [id number] Phone Number: MRN: [id number] Current Benefit Period: Benefit Period 1 Start Date: 12/11/2020 End Date: 3/10/2021 Hospice Attending Provider: Renetta Hernandez MD 
[address] [place] [id number] Phone: [phone] Fax: [phone] Level of Care: General Inpatient Care 
 
 
___________________ Diagnoses: 
Diagnoses of Generalized pain, Labored breathing, Restlessness, Unresponsive, Hospice care, Parkinson's disease ([address] and Irregular breathing pattern were pertinent to this visit. Current Medications: 
 
Current Facility-Administered Medications:  
  HYDROmorphone (PF) (DILAUDID) injection 1 mg, 1 mg, IntraVENous, E8F, Raven Morris MD, 1 mg at 12/17/20 0521 
  HYDROmorphone (PF) (DILAUDID) injection 1 mg, 1 mg, IntraVENous, [id number] PRN, Antonette York MD, 1 mg at 12/16/20 1423   LORazepam (ATIVAN) injection 1 mg, 1 mg, IntraVENous, Q15MIN PRN, Renetta Hernandez MD, 1 mg at 12/16/20 1423   acetaminophen (TYLENOL) suppository 650 mg, 650 mg, Rectal, Q4H PRN, Renetta Hernandez MD, 650 mg at 12/16/20 1156 
  glycopyrrolate (ROBINUL) injection 0.2 mg, 0.2 mg, IntraVENous, Q4H PRN, Renetta Hernandez MD 
  bisacodyL (DULCOLAX) suppository 10 mg, 10 mg, Rectal, DAILY PRN, Renetta Hernandez MD 
  sodium chloride (NS) flush 5 mL, 5 mL, InterCATHeter, Q24H, Renetta Hernandez MD, 5 mL at 12/16/20 1300   LORazepam (ATIVAN) injection 1 mg, 1 mg, IntraVENous, Q4H, Renetta Hernandez MD, 1 mg at 12/17/20 0438 Orders: 
Orders Placed This Encounter  NURSING-MISCELLANEOUS: NO admission labs, x-rays or other diagnostic tests, unless pertinent to symptom control 1. NO admission labs, x-rays or other diagnostic tests, unless pertinent to symptom control . CONTINUOUS 1. NO admission labs, x-rays or other diagnostic tests, unless pertinent to symptom control . Standing Status:   Standing Number of Occurrences:   1 Order Specific Question:   Description of Order: Answer:   NO admission labs, x-rays or other diagnostic tests, unless pertinent to symptom control  COMFORT MEASURES ONLY Standing Status:   Standing Number of Occurrences:   1 UlAlfredo Ye 53 Vitals daily in the am & PRN Standing Status:   Standing Number of Occurrences:   [id number]  
 NOTIFY PROVIDER: SPECIFY NOTIFY Hospice [phone]: FOR PAIN, DYSPNEA, AGITATION, OTHER DISTRESS OR NOT RESPONDING TO ORDERED INTERVENTIONS ONE TIME Routine Standing Status:   Standing Number of Occurrences:   1 Order Specific Question:   Please describe the test or procedure you would like to order. Answer:   NOTIFY Hospice [phone]: FOR PAIN, DYSPNEA, AGITATION, OTHER DISTRESS OR NOT RESPONDING TO ORDERED INTERVENTIONS  BEDREST, COMPLETE Standing Status:   Standing Number of Occurrences:   1  
 ORAL CARE Keep mouth moisturized with sponge sticks/toothettes and tap water. Vaseline to lips and nares as needed. Standing Status:   Standing Number of Occurrences:   1  
 NURSING-MISCELLANEOUS: Admit to GIP level of care; SN visit daily X 7 with 5 visits PRN symptom control; SW visit 1 X weekly and 5 visits PRN family support,  visit 1 X weekly and 5 visits PRN spiritual support Admit to GIP level of care; . .. Admit to GIP level of care; SN visit daily X 7 with 5 visits PRN symptom control; SW visit 1 X weekly and 5 visits PRN family support,  visit 1 X weekly and 5 visits PRN spiritual support. Standing Status:   Standing Number of Occurrences:   1 Order Specific Question:   Description of Order: Answer:   Admit to GIP level of care; SN visit daily X 7 with 5 visits PRN symptom control; SW visit 1 X weekly and 5 visits PRN family support,  visit 1 X weekly and 5 visits PRN spiritual support  NURSING-MISCELLANEOUS: call Hospice at time of death [phone]. Pt will use [facility] [address] Standing Status:   Standing Number of Occurrences:   1 Order Specific Question:   Description of Order: Answer:   call Hospice at time of death [phone]. Pt will use [facility]  HURTADO CATHETER, CARE As per hospital policy for infection prevention Standing Status:   Standing Number of Occurrences:   1  
 NURSING-MISCELLANEOUS: [employer] Dressing change and care for infection prevention as per hospital policy CONTINUOUS Standing Status:   Standing Number of Occurrences:   1 Order Specific Question:   Description of Order: Answer:   [employer] change and care for infection prevention as per hospital policy  NURSING-MISCELLANEOUS: No Deep suctioning. Call Hospice if secretions are unmanaged with current plan of care. CONTINUOUS Standing Status:   Standing Number of Occurrences:   1 Order Specific Question:   Description of Order: Answer:   No Deep suctioning. Call Hospice if secretions are unmanaged with current plan of care.  DO NOT RESUSCITATE Standing Status:   Standing Number of Occurrences:   1  Contact Isolation Standing Status:   Standing Number of Occurrences:   1  Droplet Isolation Standing Status:   Standing Number of Occurrences:   1  Droplet Plus Isolation Standing Status:   Standing Number of Occurrences:   1  
 Enteric Contact Isolation Standing Status:   Standing Number of Occurrences:   1  OXYGEN CANNULA Liters per minute: 2; Indications for O2 therapy: OTHER PRN Routine Oxygen as needed. Adjust for comfort. Discontinue if not contributing to patient comfort. Do not titrate above 4 lpm without contacting Keely Cortez 2 Standing Status:   Standing Number of Occurrences:   [id number] Order Specific Question:   Liters per minute: Answer:   2 Order Specific Question:   Indications for O2 therapy Answer:   OTHER  
 FALL PRECAUTIONS Standing Status:   Standing Number of Occurrences:   1  
 LORazepam (ATIVAN) injection 1 mg  acetaminophen (TYLENOL) suppository 650 mg  
 ketorolac (TORADOL) injection 15 mg  
 glycopyrrolate (ROBINUL) injection 0.2 mg  
 bisacodyL (DULCOLAX) suppository 10 mg  
 DISCONTD: morphine injection 2 mg  sodium chloride (NS) flush 5 mL  DISCONTD: morphine injection 2 mg  DISCONTD: morphine injection 2 mg  DISCONTD: morphine injection 2 mg  LORazepam (ATIVAN) injection 1 mg  dexamethasone (DECADRON) 4 mg/mL injection 2 mg  ketorolac (TORADOL) injection 15 mg  
 HYDROmorphone (PF) (DILAUDID) injection 1 mg  
 HYDROmorphone (PF) (DILAUDID) injection 1 mg  INITIAL PHYSICIAN ORDER: INPATIENT Inpatient Hospice; 3. Patient receiving treatment that can only be provided in an inpatient setting (further clarification in H&P documentation) Standing Status:   Standing Number of Occurrences:   1 Order Specific Question:   Status: Answer:   Alejandro  Order Specific Question:   Type of Bed Answer:   Inpatient Hospice [5] Order Specific Question:   Inpatient Hospitalization Certified Necessary for the Following Reasons Answer:   3. Patient receiving treatment that can only be provided in an inpatient setting (further clarification in H&P documentation) Order Specific Question:   Admitting Diagnosis Answer:   Parkinson's disease [facility]) [[id number]] Order Specific Question:   Admitting Physician Answer:   Ashlyn Sullivan [[id number]] Order Specific Question:   Attending Physician Answer:   Ashlyn Sullivan [[id number]] Order Specific Question:   Estimated Length of Stay Answer:   3-4 Midnights Order Specific Question:   Discharge Plan: Answer:   Extended Care Facility (e.g. Adult Home, Nursing Home, etc.) Order Specific Question:   Comments Answer:   return to [facility] if stable  IP CONSULT TO MOBILITY SERVICES [facility]) Standing Status:   Standing Number of Occurrences:   15 Order Specific Question:   Reason for Consult: Answer: Turn and reposition for comfort only. Hospice patient Allergies: Allergies Allergen Reactions  Alprazolam Rash Care Plan: 
Multidisciplinary Problems (Active) Problem: Anticipatory Grief Dates: Start: 12/11/20 Description: Deficit related to anticipatory grief and pre-bereavement needs. Disciplines: Interdisciplinary Goal: Grief heard and acknowledged, anxiety reduced, patient coping identified, patient/family expressed gratitude Priority: High Description: Wife [facility] and 21year old son Timbo Turner will have support in processing anticipatory grief and relational loss and will have  referral to pre-bereavement within 5 days. Disciplines: Interdisciplinary Intervention: Assist with grief counseling Description: LCSW will provide grief support for wife [facility] and 21year old elsa Turner in processing anticipatory grief and relational loss. LCSW will also refer to pre-beraevement. Problem: Anxiety Dates: Start: 12/11/20 Disciplines: Interdisciplinary Goal: *Alleviation of anxiety Dates: Start: 12/11/20   Expected End: 12/18/20 Disciplines: Interdisciplinary Intervention: Anxiety assessment (eg: Attention span; behavior manifestation; coping mechanism;  self-perception; sleep pattern) Dates: Start: 12/11/20 Description: REMINDER(s): 
For patients at risk of anxiety, consider the use of an anxiety assessment scale. Intervention: Comfort promotion (eg: Uninterrupted sleep; sensory stimuli reduction) Dates: Start: 12/11/20 Intervention: Consult, pastoral care Dates: Start: 12/11/20 Problem: Breathing Pattern - Ineffective Dates: Start: 12/11/20 Disciplines: Interdisciplinary Goal: *Use of effective breathing techniques Dates: Start: 12/11/20   Expected End: 12/18/20 Disciplines: Interdisciplinary Intervention: Patent airway maintenance Dates: Start: 12/11/20 Intervention: Head of bed elevation Dates: Start: 12/11/20 Intervention: Breathing pattern signs and symptoms assessment (eg: Apnea; bradypnea; pursed-lip; dyspnea; hyperpnea; paradoxical; periodic; hyperventilation; hypoventilation; tachypnea) Dates: Start: 12/11/20 Intervention: Anxiety assessment (eg: Attention span; behavior manifestation; coping mechanism;  self-perception; sleep pattern) Dates: Start: 12/11/20 Intervention: Monitor for change in patient condition (eg: Vital signs; hypoxemia; mental status; level of consciousness; skin temperature, color) Dates: Start: 12/11/20 Intervention: Respiratory management (eg: Oxygen therapy; suctioning) Dates: Start: 12/11/20 Problem: Emotional Support Needs Dates: Start: 12/11/20 Description: Deficit related to emotional support. Disciplines: Interdisciplinary Goal: Patient/family is receiving emotional support Dates: Start: 12/11/20 Priority: High Description: Antoine Juarez, son Flaca Portillo and family will have emotional support and supportive counseling in coping with pts EOL due to PD within 5 days. Disciplines: Interdisciplinary Intervention: Provide emotional support Dates: Start: 12/11/20 Description: LCSW will provide emotional support and supportive counseling for wife Antoine Juarez, son Flaca Portillo and family in coping with pts EOL due to PD. Problem: Falls - Risk of   
 Dates: Start: 12/11/20 Disciplines: Interdisciplinary Goal: *Absence of Falls Dates: Start: 12/11/20   Expected End: 12/18/20 Description: Document Felix De Los Santos Fall Risk and appropriate interventions in the flowsheet. Disciplines: Interdisciplinary Problem: Impaired Family Dynamics Dates: Start: 12/11/20 Description: Deficit related to impaired family dynamics. Disciplines: Interdisciplinary Goal: Verbalize feelings on relationships, problems, and/or fears Priority: High Description: Antoine Juarez and family will have support in coping with strained family dynamics within 5 days. Disciplines: Interdisciplinary Intervention: Encourage family to share feelings of frustration, guilt, fear, or depression surrounding patient's illness, level of function, and behavior patterns Dates: Start: 12/11/20 Description: LCSW will provide support to Antoine Juarez and family in coping with strained family dynamics. Problem: Infection - Risk of, Central Venous Catheter-Associated Bloodstream Infection Dates: Start: 12/11/20 Disciplines: Nurse, Interdisciplinary, RT  
 Goal: *Absence of infection signs and symptoms Dates: Start: 12/11/20   Expected End: 12/18/20 Disciplines: Nurse, Interdisciplinary, RT Intervention: Central venous catheter site(s) assessment (eg: Pain; color; exudate; edema; odor; skin integrity) Dates: Start: 12/11/20 Intervention: Infection assessment -general (eg: Onset of weakness; white blood cells; shivering; hyper/hypoglycemia; temp/weight/energy/mental status altered; tachycardia; hypotension; tachypnea; respiratory) Dates: Start: 12/11/20 Intervention: Central venous catheter management per bundle (eg: Lines maintained; port sterility maintained; dressing changed per policy and procedure) Dates: Start: 12/11/20 Problem: Infection - Risk of, Urinary Catheter-Associated Urinary Tract Infection Dates: Start: 12/11/20 Disciplines: Interdisciplinary Goal: *Absence of infection signs and symptoms Dates: Start: 12/11/20   Expected End: 12/18/20 Disciplines: Interdisciplinary Intervention: Urinary catheter needs assessment (eg: Impaired neurologic status; post void residual; spinal cord injury; urinary outflow obstruction; urinary retention; urinary incontinence; fluid imbalance) Dates: Start: 12/11/20 Intervention: Urine assessment (eg: Clarity; color; odor; volume) Dates: Start: 12/11/20 Intervention: Infection signs and symptoms monitoring - urinary tract (eg: Flank or suprapubic pain; burning; fever; dysuria; frequency; urgency; cloudy/bloody/foul odor urine; confusion/agitation; incontinence) Dates: Start: 12/11/20 Intervention: Urinary catheter management (eg: Closed urinary catheter system maintenance; urinary catheter patency with free downhill flow; perineal care; monitor intake and output) Dates: Start: 12/11/20 Intervention: Urinary catheter discontinuation Dates: Start: 12/11/20 Description: REMINDER(s): 
Urinary catheter discontinuation promptly as indicated; remind physician to remove at or before day 5. Problem: Pain Dates: Start: 12/11/20 Disciplines: Interdisciplinary Goal: *Control of acute pain Dates: Start: 12/11/20   Expected End: 12/18/20 Disciplines: Interdisciplinary Intervention: Assess pain characteristics (eg: Intensity scale; onset; location; quality; severity; duration; frequency; radiation) Dates: Start: 12/11/20 Intervention: Assess pain management - barriers (eg: Past pain experiences) Dates: Start: 12/11/20 Intervention: Identify pain expectations (eg: Patient's pain goal; somatic experiences; behavioral changes; affect) Dates: Start: 12/11/20 Intervention: Identify pain medication concerns (eg: Cultural considerations; addiction concerns) Dates: Start: 12/11/20 Intervention: Support system identification (eg: Caregiver; community resource; family; friends; Jainism; support group) Dates: Start: 12/11/20 Intervention: Monitor for change in patient condition (eg:  Vital signs changes; changes in level of consciousness; nausea; behavioral changes) Dates: Start: 12/11/20 Intervention: Medication side-effect assessment Dates: Start: 12/11/20 Intervention: Pain-relief response reassessment (eg: Frequency based on route of administration; effectiveness) Dates: Start: 12/11/20 Problem: Patient Education:  Go to Education Activity Dates: Start: 12/11/20 Disciplines: Interdisciplinary Goal: Patient/Family Education Dates: Start: 12/11/20   Expected End: 12/18/20 Disciplines: Interdisciplinary Intervention: Provide bereavement information, maintain a proactive family-focused approach, use structured format and allow for time for the family to talk Description:   
  
  
  
  
  
  
 Problem: Patient Education: Go to Patient Education Activity Dates: Start: 12/11/20 Disciplines: Interdisciplinary Goal: Patient/Family Education Dates: Start: 12/11/20 Disciplines: Interdisciplinary Problem: Pressure Injury - Risk of   
 Dates: Start: 12/11/20 Disciplines: Interdisciplinary Goal: *Prevention of pressure injury Dates: Start: 12/11/20   Expected End: 12/18/20 Disciplines: Interdisciplinary Intervention: Pressure injury risk assessment tool Dates: Start: 12/11/20 Description: (e.g.: Sunny Scale) Intervention: Pressure-relieving device needs assessment Dates: Start: 12/11/20 Intervention: Pressure-relieving device implementation (eg: Specialty beds; wheel chair cushions; heel lift boots) Dates: Start: 12/11/20 Intervention: Skin assessment (e.g. existing ulcers, color; integrity; moisture; tommy prominences; blisters; friction/shear injuries) Dates: Start: 12/11/20 Intervention: Skin monitoring and care (e.g. skin cleansing; keep dry, moisturize, utilization of  lotion and/or skin barrier cream) Dates: Start: 12/11/20 Intervention: Blood glucose control (eg: Monitoring; medication; nutrition/hydration) Dates: Start: 12/11/20 Intervention: Position change (eg: Techniques to position; turn and transfer per schedule; cushion tommy prominences; float heels; encourage mobility) Dates: Start: 12/11/20 Intervention: Nutrition promotion (eg: Micro/macro nutrient supplementation; encourage oral intake; blood glucose control; nutrition support when indicated) Dates: Start: 12/11/20 Problem: Pressure Injury - Risk of   
 Dates: Start: 12/11/20 Disciplines: Interdisciplinary Goal: *Prevention of pressure injury Dates: Start: 12/11/20 Description: Document Sunny Scale and appropriate interventions in the flowsheet. Disciplines: Interdisciplinary Problem: Risk for Spread of Infection Dates: Start: 12/11/20 Disciplines: Interdisciplinary Goal: Prevent transmission of infectious organism to others Dates: Start: 12/11/20   Expected End: 12/18/20 Description: Prevent the transmission of infectious organisms to other patients, staff members, and visitors. Disciplines: Interdisciplinary Intervention: Verify correct isolation order has been placed for confirmed or suspected infection Dates: Start: 12/11/20 Intervention: Proper isolation precautions placed on patient door Dates: Start: 12/11/20 Intervention: Proper PPE utilized by all who enter patient's room Dates: Start: 12/11/20 Intervention: Proper hand hygiene Dates: Start: 12/11/20 Description: Use alcohol based hand  or soap and water. Intervention: Proper decontamination of surfaces and shared equipment/devices Dates: Start: 12/11/20 Care Plan Problems/Goals Progressing Towards Goal (14) Prevent transmission of infectious organism to others (Risk for Spread of Infection) Disciplines:  Interdisciplinary Expected end:  12/18/20 Outcome: Progressing Towards Goal By Carina Arndt RN on 12/14/20 2302 Patient/Family Education (Patient Education:  Go to Education Activity) Disciplines:  Interdisciplinary Expected end:  12/18/20 Outcome: Progressing Towards Goal By Carina Arndt RN on 12/14/20 2302 *Absence of infection signs and symptoms (Infection - Risk of, Central Venous Catheter-Associated Bloodstream Infection) Disciplines:  Nurse, Interdisciplinary, RT Expected end:  12/18/20 Outcome: Progressing Towards Goal By Idris Frost RN on 12/14/20 1146  
  
  
 
 *Absence of infection signs and symptoms (Infection - Risk of, Urinary Catheter-Associated Urinary Tract Infection) Disciplines:  Interdisciplinary Expected end:  12/18/20 Outcome: Progressing Towards Goal By Idris Frost RN on 12/14/20 1146 *Alleviation of anxiety (Anxiety) Disciplines:  Interdisciplinary Expected end:  12/18/20 Outcome: Progressing Towards Goal By Jin Garcia RN on 12/14/20 1146 *Use of effective breathing techniques (Breathing Pattern - Ineffective) Disciplines:  Interdisciplinary Expected end:  12/18/20 Outcome: Progressing Towards Goal By Jin Garcia RN on 12/14/20 1146 *Control of acute pain (Pain) Disciplines:  Interdisciplinary Expected end:  12/18/20 Outcome: Progressing Towards Goal By Pavan Hannah RN on 12/14/20 2302 *Prevention of pressure injury (Pressure Injury - Risk of) Disciplines:  Interdisciplinary Expected end:  12/18/20 Outcome: Progressing Towards Goal By Jin Garcia RN on 12/14/20 1146  
  
  
 
 *Absence of Falls (Falls - Risk of) Disciplines:  Interdisciplinary Expected end:  12/18/20 Outcome: Progressing Towards Goal By Pavan Hannah RN on 12/14/20 2302 *Prevention of pressure injury (Pressure Injury - Risk of) Disciplines:  Interdisciplinary Expected end:  - Outcome: Progressing Towards Goal By Pavan Hannah RN on 12/14/20 2302 Patient/Family Education (Patient Education: Go to Patient Education Activity) Disciplines:  Interdisciplinary Expected end:  - Outcome: Progressing Towards Goal By Jin Garcia RN on 12/14/20 1146 Patient/family is receiving emotional support (Emotional Support Needs) Disciplines:  Interdisciplinary Expected end:  - Outcome: Progressing Towards Goal By Sanford Fregoso LCSW on 12/16/20 1122 Verbalize feelings on relationships, problems, and/or fears (Impaired Family Dynamics) Disciplines:  Interdisciplinary Expected end:  - Outcome: Progressing Towards Goal By Jin Garcia RN on 12/14/20 1146 Grief heard and acknowledged, anxiety reduced, patient coping identified, patient/family expressed gratitude (Anticipatory Grief) Disciplines:  Interdisciplinary Expected end:  - Outcome: Progressing Towards Goal By Gonzalo Meng on 12/16/20 1123  
  
  
 
  
  
  
  
  
 
 
___________________ Care Team Notes POC/IDG Notes [facility] IDG  Notes by Lan Contreras at 12/16/20 1428  Version 1 of 1 Author: Lan Contreras Service: Hospice and Palliative Care Author Type: Pastoral Care Filed: 12/16/20 1429 Date of Service: 12/16/20 1428 Status: Signed : Lan Contreras [facility] [place]) Patient: Harleen White. Date: 12/16/20 Time: 2:28 PM 
 
[facility]  Notes This was an initial visit to assess needs and offer support. Introduced myself to wife at the door as patient is Covid -23. She thanked me for coming but noted no needs at this time. They are Worship and prayers were offered in the hallway by our  who  to our [facility] patients. Signed by: [facility]ector [facility]  Notes by Gonzalo Meng at 12/11/20 1454  Version 1 of 1 Author: Gonzalo Meng Service: Hospice and Palliative Care Author Type:  Filed: 12/11/20 1501 Date of Service: 12/11/20 4449 Status: Signed : Gonzalo Meng () Harleen White is a 75 y/o CM with a hospice diagnosis of Parkinsons Disease. Pt has multiple comorbidities including sepsis, COVID 19, SUMEET, and metabolic encephalopathy. As pt is COIVD +, LCSW will not be visiting at bedside. LCSW will provide emotional support and supportive counseling for wife [facility], son Omar Bauer and family in coping with pts EOL due to PD. 
 
LCSW will provide support to [facility] and family in coping with strained family dynamics. LCSW will provide grief support for wife Yvon Lin and 21year old son Lv Turner in processing anticipatory grief and relational loss. LCSW will also refer to pre-beraevement. Moderate risk for bereavement due to pts young age, 22 y/o son having difficulty coping and strained family dynamics. FH to be discussed. Care Team Present: MD, RN, LCSW, , bereavement

## 2020-12-17 NOTE — HOSPICE
[facility] Those in Need 
[phone] GIP Daily Nursing Note Patient Name: Dion Dover. YOB: 1955 Age: 76 y.o. Date of Visit: 12/17/20 Facility of Care: [facility] Patient Room: 215/01 Hospice Attending: Avery Giraldo MD 
Hospice Diagnosis: [facility]'s disease ([facility] [place] Level of Care: GIP Current GIP Symptoms 1. Pain 2. Dyspnea 3. Anxiety 4. fevers ASSESSMENT & PLAN Must update Plan of Care including visit frequencies for IDT members 1. Increased IV dilaudid to 1.5mg every 4 hours with prn available every 15 minutes for pain, dyspnea 2. IV ativan 1mg every 4 hours scheduled with prn available every 15 minutes as needed for severe anxiety, agitation 3. IV robinul 0.2mg every 4 hours as needed for secretions 4. IV toradol and tylenol suppository for fevers 5. Turn and reposition patient every 4 hours, oral care as tolerated, boyle care per protocol, RN staff to manage IJ.  
6. Support family. Spiritual Interventions: holy water and oils at bedside, prayers have been said outside of door Psych/ Social/ Emotional Interventions: Spouse and sister in law at bedside. Offered emotional support and continued education of symptoms related to EOL. Care Coordination Needs: Reviewed plan of care with Dr. Malou Lopez and family Care plan and New Orders discussed / approved with Dr. Malou Lopez MD. Description History and Chart Review If this is initial GIP note must document RN assessment/MD communication in previous setting. Specifically document nursing/medication needs in last 24 hours to support GIP care Narrative History of last 24 hours that demonstrates care cannot be provided in another setting: 
Patient requiring IV medications, titration of medications, too unstable for transport What has been done to control the patient's symptoms in the last 24 hours? IV medications, prn medications Does the patient currently require IV medications? yes Does the patient currently require scheduled medications? yes Does the patient currently require a PCA? no 
 
List number of doses of PRN medications in last 24 hours: 
Medication 1:Dilaudid Number of doses: 2 Medication 2: Ativan Number of doses: 2 Supporting documentation for GIP need for pain control: 
[x] Frequent evaluation by a doctor, nurse practitioner, nurse  
[x] Frequent medication adjustment   
[x] IVs that cannot be administered at home  
[] Aggressive pain management  
[x] Complicated technical delivery of medications Supporting documentation for GIP need for symptom control: 
[x]  Sudden decline necessitating intensive nursing intervention 
[]  Uncontrolled / intractable nausea or vomiting  
[]  Pathological fractures 
[]  Advanced open wounds requiring frequent skilled care [x] Unmanageable respiratory distress 
[] New or worsening delirium  
[] Delirium with behavior issues: Is 24 hour caregiver present due to safety concerns with agitation? (yes/no) [x] Imminent death  with skilled nursing needs documented above DISCHARGE PLANNING Daily discharge planning required for GIP 1. Discharge Plan: patient appears imminent, will likely pass at [facility] 2. Patient/Family teaching: end of life signs and symptoms 3. Response to patient/family teaching: educated on breathing changes ASSESSMENT   
KARNOFSKY: 10 Prognosis estimated based on 12/17/20 clinical assessment is:  
[x] Few to Many Hours [] Hours to Days  
[] Few to Many Days  
[] Days to Weeks  
[] Few to Many Weeks  
[] Weeks to Months  
[] Few to Many Months Quality Measure: Patient self-reports:  [] Yes    [x] No 
 
 
LAST BM: 12/12/2020 CLINICAL INFORMATION Patient Vitals for the past 12 hrs: 
 Temp Pulse Resp BP SpO2  
12/17/20 0902 99.9 °F (37.7 °C) (!) 114 22 90/63 (!) 85 % Currently this patient has: 
[] Supplemental O2 [x] IV   
 [x] PICC [] PORT  
[] NG Tube   
[] PEG Tube  
[] Ostomy    
[x] Cortez draining doris urine 
[] Other:  
 
SIGNS/PHYSICAL FINDINGS Skin (including wound): 
[] Warm, dry, supple, intact and color normal for race [x] Warm  
[x] Dry  
[] Cool    
[] Clammy      
[] Diaphoretic Turgor 
 [] Normal 
 [x] Decreased Color: 
 [] Pink 
 [] Pale 
 [] Cyanotic 
 [] Erythema [x] Jaundice [] Normal for Race 
[]  Wounds: 
 
Neuro: 
[] Lethargy 
[] Restlessness / agitation 
[] Confusion / delirium 
[] Hallucinations 
[] Responds to maximal stimulation 
[x] Unresponsive 
[] Seizures Cardiac: 
[x] Dyspnea on Exertion 
[] JVD [] Murmur 
[] Palpitations [] Hypotension 
[] Hypertension 
[x] Tachycardia [] Bradycardia [x] Irregular HR [x] Pulses Decreased 
[] Pulses Absent 
[] Edema:       (Location, Grade and Pitting) [] Mottling:      (Location) Respiratory: 
Breath sounds:  
 [x] Diminished 
 [] Wheeze [x] Rhonchi 
 [] Rales  
[] Even and unlabored 
[x] Labored: 20 
[] Cough 
 [] Non Productive 
 [] Productive 
  [] Description:          
[] Deep suctioned  
[] O2 at ___ LPM 
[] High flow oxygen greater than 10 LPM 
[] Bi-Pap GI [x] Abdomen (describe) [] Ascites 
[] Nausea 
[] Vomiting 
[x] Incontinent of bowels [x] Bowel sounds (yes/no) [] Diarrhea 
[] Constipation (see above including last bowel movement) [] Checked for impaction 
[x] Last BM 12/12/2020 Nutrition Diet: NPO__________ Appetite:  
[] Good  
[] Fair  
[] Poor  
[] Tube Feeding  
[] Voiding 
[] Incontinent [x] Cortez Musculoskeletal 
[] Balance/Franklin Unsteady [x] Weak Strength:  
 [] Normal  
 [] Limited [x] Decreasing Activities:  
 [] Up as tolerated 
 [x] Bedridden  
 [] Specify: 
 
SAFETY [] 24 hr. Caregiver [x] Side rails ? [x] Hospital bed  
[] Reviewed Falls & Safety ALLERGIES AND MEDICATIONS Allergies: Allergies Allergen Reactions  Alprazolam Rash Current Facility-Administered Medications Medication Dose Route Frequency  HYDROmorphone (PF) (DILAUDID) injection 1.5 mg  1.5 mg IntraVENous Q4H  
 HYDROmorphone (PF) (DILAUDID) injection 1 mg  1 mg IntraVENous Q15MIN PRN  
 LORazepam (ATIVAN) injection 1 mg  1 mg IntraVENous Q15MIN PRN  
 acetaminophen (TYLENOL) suppository 650 mg  650 mg Rectal Q4H PRN  
 glycopyrrolate (ROBINUL) injection 0.2 mg  0.2 mg IntraVENous Q4H PRN  
 bisacodyL (DULCOLAX) suppository 10 mg  10 mg Rectal DAILY PRN  
 sodium chloride (NS) flush 5 mL  5 mL InterCATHeter Q24H  
 LORazepam (ATIVAN) injection 1 mg  1 mg IntraVENous Q4H Visit Time In: 3:15 Visit Time Out: 3:30

## 2020-12-17 NOTE — [facility] IDG OTHER NOTES
BEREAVEMENT NOTE: 
During the interdisciplinary group meeting on 12/17/2020, the primary care team reviewed the patient's admission, and bereavement was discussed. It was confirmed that wife Ravi Kendrick is the primary bereaved at medium risk level due to strained family dynamics with patient's father and sister and her concern about son (age 21). LCSW had contacted family on 12/13/20 to offer pre-bereavement support, and LCSW will contact son by the end of the week as planned with the family; focusing on providing emotional support regarding anticipatory grief.

## 2020-12-17 NOTE — PROGRESS NOTES
[email or web address] Those in Need 
[phone] Patient Name: Tito Fernández. YOB: 1955 Date of Provider Hospice Visit: 12/17/20 Level of Care:   [x] General Inpatient (GIP)    [] Routine   [] Respite Current Location of Care: 
[x] [facility] [] [facility] [] [facility] [] [address] [place] [] Hospice [facility], patient referred from: 
[] [facility] [] [facility] [] [facility] [] [address] [place] [] Home [] Other:  
 
Date of Original Hospice Admission: 12/11/2020 Hospice Medical Director at time of admission: Izabella Pimentel MD 
 
Principle Hospice Diagnosis: Parkinson's disease Diagnoses RELATED to the terminal prognosis:  
COVID-19 Other Diagnoses: Disha Marie is a 76y.o. year old who [facility]. He has Parkinson's disease and was a resident of [facility]. He was admitted to [facility] on 12/3/2020 with AHRF and sepsis. CXR demonstrated airspace disease of the left lung. He was found to be positive for COVID-19. He did not recover despite treatment. He was unable to take his chronic Parkinson's medications d/t inability to tolerate oral intake. The family chose to pursue comfort measures with the support of Hospice. He is being admitted to inpatient hospice care on 12/11 for worsening pain and restlessness. On my 12/11 exam patient is unresponsive with anticipated prognosis of hours to days. Objective information:  
12/6/2020 CXR: 
IMPRESSION:  
Worsening airspace disease throughout the left mid and lower lung. HOSPICE ASSESSMENT Active Symptoms and Issues: 
-Generalized pain 
-Anxiety/agitation/restlessness 
-Irregular/breathing /periods of apnea  
-Dysphagia 
-Unresponsiveness 
-Hospice care  PLAN  
 
 1. GIP level of care will continue. Patient needs ongoing frequent nursing assessment, IV medication management for symptoms, and unsafe to transition to sublingual medication or transition home. Patient showing evidence of increased accessory muscle use practically abdominal wall with his breathing this afternoon. We will make further adjustments in his opioids. 2. Pt is at high risk of rapid decline and death due to terminal disease process. 3. For pain and dyspnea control, will increase Dilaudid to 1.5 mg IV every 4 hours scheduled and every 15 minutes as needed. 4. For restlessness, on scheduled ativan 1 mg IV every 4 hours routinely and prn anxiety, agitation, restlessness. No as needed doses he did need 1 as needed dose of Ativan last 24 hours. 5. We will have glycopyrrolate 0.2 mg IV available prn to help prevent formation of new secretions. Recommend  Gentle anterior suction of secretions okay (i.e. suction around lips/teeth). Recommend avoiding deep suction to prevent irritation, pain, bleeding. 6. Prn bisacodyl for constipation 7. Prn tylenol and/or toradol for fever. 8.  Psychosocial-no family at the bedside. 9. Reviewed plan with bedside nurse, hospice liaison. No family at the bedside today. 10. Will monitor sxs and prn use for need to schedule/titrate medications further 11.  and SW to support family needs. 12. Disposition: anticipate that pt will decline and die in the coming hours-days without stabilizing enough for care in the home setting 13. Prognosis estimated based on today's clinical assessment is  
[x] Hours to Days   
[] Days to Weeks   
[] Other: 
 
Plan of care communicated with bed side Rn and hospice Rn Marycarmen Mistry. GOALS OF CARE Patient/Medical POA stated Goal of Care: optimize patient comfort [] I have reviewed and/or updated ACP information in the Advance Care Planning Navigator. This information is available in the [address] link in the patient's chart header. Primary Medical Decision Maker:   Primary Decision Maker: Vika Talamantes - Spouse - [phone] Resuscitation Status: DNR If DNR is there a Durable DNR on file? : [] Yes [] No (If no, complete Durable DNR) HISTORY History obtained from: chart review, hospice idt, patient's family CHIEF COMPLAINT: patient cannot give as unresponsive The patient is:  
[] Verbal 
[] Nonverbal 
[x] Unresponsive HPI/SUBJECTIVE:   
12/11: patient unresponsive. Multiple prns morphine and ativan needed today for pain, dyspnea, restlessness. Wife's goal is for comfort with hospice. 12/12; patient remains unresponsive . 12/14patient is unresponsive. No as needed doses needed. Currently appears comfortable 12/15-Patient with slight SOB. No family at the bedside 12/16patient unresponsive, wife at the bedside playing path intelligence music 
 
12/17patient remains unresponsive. Does show evidence of increased work of breathing with abdominal musculature use. Respiratory rate in the low 20s. Heart rate in the 120s. REVIEW OF SYSTEMS The following systems were: [] reviewed  [x] unable to be reviewed as pt is unresponsive Positive ROS include bold items: 
Constitutional: fatigue, weakness, in pain, short of breath Ears/nose/mouth/throat: increased airway secretions Respiratory:shortness of breath, wheezing Gastrointestinal:poor appetite, nausea, vomiting, abdominal pain, constipation, diarrhea Musculoskeletal:pain, deformities, swelling legs Neurologic:confusion, hallucinations, weakness Psychiatric:anxiety, feeling depressed, poor sleep Endocrine: increased thirst, increased hunger Adult Non-Verbal Pain Assessment Score: 2 Face [x] 0   No particular expression or smile [] 1   Occasional grimace, tearing, frowning, wrinkled forehead 
[] 2   Frequent grimace, tearing, frowning, wrinkled forehead Activity (movement) [x] 0   Lying quietly, normal position 
[] 1   Seeking attention through movement or slow, cautious movement 
[] 2   Restless, excessive activity and/or withdrawal reflexes Guarding 
[x] 0   Lying quietly, no positioning of hands over areas of body 
[] 1   Splinting areas of the body, tense 
[] 2   Rigid, stiff Physiology (vital signs) [x] 0   Stable vital signs [] 1   Change in any of the following: SBP > 20mm Hg; HR > 20/minute 
[] 2   Change in any of the following: SBP > 30mm Hg; HR > 25/minute Respiratory 
[] 0   Baseline RR/SpO2, compliant with ventilator 
[] 1   RR > 10 above baseline, or 5% drop SpO2, mild asynchrony with ventilator 
[x] 2   RR > 20 above baseline, or 10% drop SpO2, asynchrony with ventilator FUNCTIONAL ASSESSMENT Palliative Performance Scale (PPS): 10 PSYCHOSOCIAL/SPIRITUAL ASSESSMENT Active Problems: 
  Parkinson's disease ([facility] (12/11/2020) Past Medical History:  
Diagnosis Date  Asthma  Dementia ([facility] lewy body dementia  Musculoskeletal disorder  Stool color black Past Surgical History:  
Procedure Laterality Date  HX OTHER SURGICAL  2012  
 knee  HX OTHER SURGICAL  2015  
 neck Social History Tobacco Use  Smoking status: Never Smoker  Smokeless tobacco: Never Used Substance Use Topics  Alcohol use: Not Currently Frequency: Never Family History Problem Relation Age of Onset  Dementia Mother  Heart Disease Mother  Cancer Father  Stroke Brother  Stroke Other Allergies Allergen Reactions  Alprazolam Rash Current Facility-Administered Medications Medication Dose Route Frequency  HYDROmorphone (PF) (DILAUDID) injection 1.5 mg  1.5 mg IntraVENous Q4H  
  HYDROmorphone (PF) (DILAUDID) injection 1 mg  1 mg IntraVENous Q15MIN PRN  
 LORazepam (ATIVAN) injection 1 mg  1 mg IntraVENous Q15MIN PRN  
 acetaminophen (TYLENOL) suppository 650 mg  650 mg Rectal Q4H PRN  
 glycopyrrolate (ROBINUL) injection 0.2 mg  0.2 mg IntraVENous Q4H PRN  
 bisacodyL (DULCOLAX) suppository 10 mg  10 mg Rectal DAILY PRN  
 sodium chloride (NS) flush 5 mL  5 mL InterCATHeter Q24H  
 LORazepam (ATIVAN) injection 1 mg  1 mg IntraVENous Q4H PHYSICAL EXAM  
 
Wt Readings from Last 3 Encounters:  
12/11/20 74.3 kg (163 lb 12.8 oz) 12/08/20 74.3 kg (163 lb 11.2 oz) 08/27/20 70.8 kg (156 lb) Visit Vitals BP 90/63 (BP 1 Location: Right arm, BP Patient Position: At rest) Pulse (!) 114 Temp 99.9 °F (37.7 °C) Resp 22 Ht 5' 7\" (1.702 m) Wt 74.3 kg (163 lb 12.8 oz) SpO2 (!) 85% BMI 25.66 kg/m² Supplemental O2  [] Yes  [x] NO Last bowel movement:  
 
Currently this patient has: 
[] Peripheral IV [] PICC  [] PORT [] ICD [] Cortez Catheter [] NG Tube   [] PEG Tube   
[] Rectal Tube [] Drain 
[] Other:  
 
General unresponsive, once again, increased respiratory rate, abdominal muscular use for work of breathing HEENTdry oral mucosa Lungsvery few scattered secretions noted, respiratory rate in the low 20s to upper teens, using abdominal muscles to breathe Cardiovascularregular rate and rhythm Abdomensoft, nontender Extremitiesno clubbing, cyanosis, edema Skin warm to the touch Neurounresponsive. Pertinent Lab and or Imaging Tests: 
Lab Results Component Value Date/Time  Sodium 141 12/07/2020 01:02 AM  
 Potassium 3.4 (L) 12/07/2020 01:02 AM  
 Chloride 109 (H) 12/07/2020 01:02 AM  
 CO2 25 12/07/2020 01:02 AM  
 Anion gap 7 12/07/2020 01:02 AM  
 Glucose 98 12/07/2020 01:02 AM  
 BUN 22 (H) 12/07/2020 01:02 AM  
 Creatinine 0.63 (L) 12/07/2020 01:02 AM  
 BUN/Creatinine ratio 35 (H) 12/07/2020 01:02 AM  
 GFR est AA >60 12/07/2020 01:02 AM  
 GFR est non-AA >60 12/07/2020 01:02 AM  
 Calcium 8.0 (L) 12/07/2020 01:02 AM  
 
Lab Results Component Value Date/Time Protein, total 6.6 12/03/2020 01:34 AM  
 Albumin 3.1 (L) 12/07/2020 12:56 AM  
 
   
 
Hill Nicole MD 
[facility]

## 2020-12-18 NOTE — PROGRESS NOTES
Patient is . No breath sounds, no pulse, no heart sounds, no response to stimuli, pupils fixed. Patient is  at 12. Notified MD and hospice RN. Jamar Avalos was notified. Family was at the bedside.

## 2020-12-18 NOTE — PROGRESS NOTES
12/17/20 2057 Vital Signs Temp 98.1 °F (36.7 °C) Temp Source Oral  
Pulse (Heart Rate) (!) 111 Heart Rate Source Monitor Resp Rate 22  
O2 Sat (%) (!) 84 % Level of Consciousness Responds to Voice BP (!) 89/61 MAP (Calculated) 70 BP 1 Method Automatic  
BP 1 Location Right arm BP Patient Position At rest  
MEWS Score 6 MEWS 6. Patient is hospice. Will continue to monitor.

## 2020-12-18 NOTE — PROGRESS NOTES
Bedside shift change report given to Sepideh Quintana RN (oncoming nurse) by Dennie Molly (offgoing nurse). Report included the following information SBAR, Kardex and MAR.

## 2020-12-18 NOTE — PROGRESS NOTES
[facility] Good Help to Those in Need 
[phone] Patient Name: Judy Faustin. YOB: 1955 Date of Provider Hospice Visit: 12/18/20 Level of Care:   [x] General Inpatient (GIP)    [] Routine   [] Respite Current Location of Care: 
[x] [facility] [] [facility] [] [facility] [] [facility] [] Hospice [facility], patient referred from: 
[] [facility] [] [facility] [] [facility] [] [facility] [] Home [] Other:  
 
Date of Original Hospice Admission: 12/11/2020 Hospice Medical Director at time of admission: Kumar Tillman MD 
 
Principle Hospice Diagnosis: Parkinson's disease Diagnoses RELATED to the terminal prognosis:  
COVID-19 Other Diagnoses: En Valera Judy Faustin. is a 76y.o. year old who [facility]. He has Parkinson's disease and was a resident of [facility]. He was admitted to [facility] on 12/3/2020 with AHRF and sepsis. CXR demonstrated airspace disease of the left lung. He was found to be positive for COVID-19. He did not recover despite treatment. He was unable to take his chronic Parkinson's medications d/t inability to tolerate oral intake. The family chose to pursue comfort measures with the support of Hospice. He is being admitted to inpatient hospice care on 12/11 for worsening pain and restlessness. On my 12/11 exam patient is unresponsive with anticipated prognosis of hours to days. Objective information:  
12/6/2020 CXR: 
IMPRESSION:  
Worsening airspace disease throughout the left mid and lower lung. HOSPICE ASSESSMENT Active Symptoms and Issues: 
-Generalized pain 
-Anxiety/agitation/restlessness 
-Irregular/breathing /periods of apnea  
-Dysphagia 
-Unresponsiveness 
-Hospice care  PLAN  
 
 1. GIP level of care will continue. Patient appears to be very end-of-life. Showing evidence of increased tachycardia, fever, slowed breathing but also using accessory muscles. Continues to need frequent nursing assessment, IV medication management, and he is not safe to transition to sublingual medication. We will make adjustments in his opioids. Jef Osborne 2. Pt is at high risk of rapid decline and death due to terminal disease process. 3. For pain and dyspnea control, will increase Dilaudid to 2 mg IV every 4 hours scheduled and every 15 minutes as needed and have asked for a as needed dose now. 4. For restlessness, on scheduled ativan 1 mg IV every 4 hours routinely and prn anxiety, agitation, restlessness. No as needed doses he did need 1 as needed dose of Ativan last 24 hours. 5. We will have glycopyrrolate 0.2 mg IV available prn to help prevent formation of new secretions. Recommend  Gentle anterior suction of secretions okay (i.e. suction around lips/teeth). Recommend avoiding deep suction to prevent irritation, pain, bleeding. 6. Prn bisacodyl for constipation 7. Prn tylenol and/or toradol for fever. 8.  Psychosocial-spent time with patient's wife, sister-in-law, son at the bedside. Reviewed the current symptoms and I think we are looking at minutes to hours as far as his prognosis. They are thankful they can visit to see him. We will make further adjustments in medication to provide comfort. 9. Reviewed plan with bedside nurse, hospice liaison. 10. Will monitor sxs and prn use for need to schedule/titrate medications further 11.  and SW to support family needs. 12. Disposition: anticipate that pt will decline and die in the coming hours-days without stabilizing enough for care in the home setting 13. Prognosis estimated based on today's clinical assessment is  
[x] Hours to Days   
[] Days to Weeks   
[] Other: 
 
Plan of care communicated with bed side Rn and hospice Rn Marycarmen Mistry. GOALS OF CARE Patient/Medical POA stated Goal of Care: optimize patient comfort 
 
[] I have reviewed and/or updated ACP information in the Advance Care Planning Navigator. This information is available in the [address] link in the patient's chart header. Primary Medical Decision Maker:   Primary Decision Maker: Vika Talamantes - Spouse - [phone] Resuscitation Status: DNR If DNR is there a Durable DNR on file? : [] Yes [] No (If no, complete Durable DNR) HISTORY History obtained from: chart review, hospice idt, patient's family CHIEF COMPLAINT: patient cannot give as unresponsive The patient is:  
[] Verbal 
[] Nonverbal 
[x] Unresponsive HPI/SUBJECTIVE:   
12/11: patient unresponsive. Multiple prns morphine and ativan needed today for pain, dyspnea, restlessness. Wife's goal is for comfort with hospice. 12/12; patient remains unresponsive . 12/14patient is unresponsive. No as needed doses needed. Currently appears comfortable 12/15-Patient with slight SOB. No family at the bedside 12/16patient unresponsive, wife at the bedside playing TVbeat music 
 
12/17patient remains unresponsive. Does show evidence of increased work of breathing with abdominal musculature use. Respiratory rate in the low 20s. Heart rate in the 120s. 
 
12/18patient is unresponsive, ashen, clear difference in his breathing pattern REVIEW OF SYSTEMS The following systems were: [] reviewed  [x] unable to be reviewed as pt is unresponsive Positive ROS include bold items: 
Constitutional: fatigue, weakness, in pain, short of breath Ears/nose/mouth/throat: increased airway secretions Respiratory:shortness of breath, wheezing Gastrointestinal:poor appetite, nausea, vomiting, abdominal pain, constipation, diarrhea Musculoskeletal:pain, deformities, swelling legs Neurologic:confusion, hallucinations, weakness Psychiatric:anxiety, feeling depressed, poor sleep Endocrine: increased thirst, increased hunger Adult Non-Verbal Pain Assessment Score: 2 Face [x] 0   No particular expression or smile 
[] 1   Occasional grimace, tearing, frowning, wrinkled forehead 
[] 2   Frequent grimace, tearing, frowning, wrinkled forehead Activity (movement) [x] 0   Lying quietly, normal position 
[] 1   Seeking attention through movement or slow, cautious movement 
[] 2   Restless, excessive activity and/or withdrawal reflexes Guarding 
[x] 0   Lying quietly, no positioning of hands over areas of body 
[] 1   Splinting areas of the body, tense 
[] 2   Rigid, stiff Physiology (vital signs) [x] 0   Stable vital signs [] 1   Change in any of the following: SBP > 20mm Hg; HR > 20/minute 
[] 2   Change in any of the following: SBP > 30mm Hg; HR > 25/minute Respiratory 
[] 0   Baseline RR/SpO2, compliant with ventilator 
[] 1   RR > 10 above baseline, or 5% drop SpO2, mild asynchrony with ventilator 
[x] 2   RR > 20 above baseline, or 10% drop SpO2, asynchrony with ventilator FUNCTIONAL ASSESSMENT Palliative Performance Scale (PPS): 10 PSYCHOSOCIAL/SPIRITUAL ASSESSMENT Active Problems: 
  [facility] (12/11/2020) Past Medical History:  
Diagnosis Date  Asthma  Dementia ([facility] [facility] lewy body dementia  Musculoskeletal disorder  Stool color black Past Surgical History:  
Procedure Laterality Date  HX OTHER SURGICAL  2012  
 knee  HX OTHER SURGICAL  2015  
 neck Social History Tobacco Use  Smoking status: Never Smoker  Smokeless tobacco: Never Used Substance Use Topics  Alcohol use: Not Currently Frequency: Never Family History Problem Relation Age of Onset  Dementia Mother  Heart Disease Mother  Cancer Father  Stroke Brother  Stroke Other Allergies Allergen Reactions  Alprazolam Rash Current Facility-Administered Medications Medication Dose Route Frequency  ketorolac (TORADOL) injection 15 mg  15 mg IntraVENous Q6H PRN  
 HYDROmorphone (PF) (DILAUDID) injection 2 mg  2 mg IntraVENous Q4H  
 HYDROmorphone (PF) (DILAUDID) injection 1 mg  1 mg IntraVENous Q15MIN PRN  
 LORazepam (ATIVAN) injection 1 mg  1 mg IntraVENous Q15MIN PRN  
 acetaminophen (TYLENOL) suppository 650 mg  650 mg Rectal Q4H PRN  
 glycopyrrolate (ROBINUL) injection 0.2 mg  0.2 mg IntraVENous Q4H PRN  
 bisacodyL (DULCOLAX) suppository 10 mg  10 mg Rectal DAILY PRN  
 sodium chloride (NS) flush 5 mL  5 mL InterCATHeter Q24H  
 LORazepam (ATIVAN) injection 1 mg  1 mg IntraVENous Q4H PHYSICAL EXAM  
 
Wt Readings from Last 3 Encounters:  
12/11/20 74.3 kg (163 lb 12.8 oz) 12/08/20 74.3 kg (163 lb 11.2 oz) 08/27/20 70.8 kg (156 lb) Visit Vitals BP (!) 77/50 (BP 1 Location: Right arm, BP Patient Position: At rest) Pulse (!) 172 Temp (!) 101.3 °F (38.5 °C) Resp 24 Ht 5' 7\" (1.702 m) Wt 74.3 kg (163 lb 12.8 oz) SpO2 (!) 83% BMI 25.66 kg/m² Supplemental O2  [] Yes  [x] NO Last bowel movement:  
 
Currently this patient has: 
[] Peripheral IV [] PICC  [] PORT [] ICD [] Cortez Catheter [] NG Tube   [] PEG Tube   
[] Rectal Tube [] Drain 
[] Other:  
 
General unresponsive, once again, increased respiratory rate, abdominal muscular use for work of breathing HEENTdry oral mucosa Lungsrespiratory rate in the low 20s, using accessory muscles, few crackles Cardiovascularregular rate and rhythm Abdomensoft, nontender Extremitiesextremities are cool Skin warm to the touch Neurounresponsive. Pertinent Lab and or Imaging Tests: 
Lab Results Component Value Date/Time  Sodium 141 12/07/2020 01:02 AM  
 Potassium 3.4 (L) 12/07/2020 01:02 AM  
 Chloride 109 (H) 12/07/2020 01:02 AM  
 CO2 25 12/07/2020 01:02 AM  
 Anion gap 7 12/07/2020 01:02 AM  
 Glucose 98 12/07/2020 01:02 AM  
 BUN 22 (H) 12/07/2020 01:02 AM  
 Creatinine 0.63 (L) 12/07/2020 01:02 AM  
 BUN/Creatinine ratio 35 (H) 12/07/2020 01:02 AM  
 GFR est AA >60 12/07/2020 01:02 AM  
 GFR est non-AA >60 12/07/2020 01:02 AM  
 Calcium 8.0 (L) 12/07/2020 01:02 AM  
 
Lab Results Component Value Date/Time Protein, total 6.6 12/03/2020 01:34 AM  
 Albumin 3.1 (L) 12/07/2020 12:56 AM  
 
   
 
Christal Malcolm MD 
[facility]

## 2020-12-18 NOTE — PROGRESS NOTES
Patient appears to be actively dying, notified family and they are at bedside, Daquan Galvez has come and prayed outside the door this morning. Family aware that he may be within hours of passing. Support offered to family. Problem: Risk for Spread of Infection Goal: Prevent transmission of infectious organism to others Description: Prevent the transmission of infectious organisms to other patients, staff members, and visitors. Outcome: Progressing Towards Goal 
  
Problem: Patient Education:  Go to Education Activity Goal: Patient/Family Education Outcome: Progressing Towards Goal 
  
Problem: Infection - Risk of, Central Venous Catheter-Associated Bloodstream Infection Goal: *Absence of infection signs and symptoms Outcome: Progressing Towards Goal 
  
Problem: Infection - Risk of, Urinary Catheter-Associated Urinary Tract Infection Goal: *Absence of infection signs and symptoms Outcome: Progressing Towards Goal 
  
Problem: Anxiety Goal: *Alleviation of anxiety Outcome: Progressing Towards Goal 
  
Problem: Breathing Pattern - Ineffective Goal: *Use of effective breathing techniques Outcome: Progressing Towards Goal 
  
Problem: Pain Goal: *Control of acute pain Outcome: Progressing Towards Goal 
  
Problem: Pressure Injury - Risk of 
Goal: *Prevention of pressure injury Outcome: Progressing Towards Goal 
  
Problem: Falls - Risk of 
Goal: *Absence of Falls Description: Document Wayne Martínez Fall Risk and appropriate interventions in the flowsheet. Outcome: Progressing Towards Goal 
Note: Fall Risk Interventions: 
Mobility Interventions: Communicate number of staff needed for ambulation/transfer Mentation Interventions: Adequate sleep, hydration, pain control, Bed/chair exit alarm, Evaluate medications/consider consulting pharmacy Medication Interventions: Evaluate medications/consider consulting pharmacy Elimination Interventions: Patient to call for help with toileting needs History of Falls Interventions: Bed/chair exit alarm, Room close to nurse's station, Evaluate medications/consider consulting pharmacy Problem: Pressure Injury - Risk of 
Goal: *Prevention of pressure injury Description: Document Sunny Scale and appropriate interventions in the flowsheet. Outcome: Progressing Towards Goal 
Note: Pressure Injury Interventions: 
Sensory Interventions: Float heels, Keep linens dry and wrinkle-free, Minimize linen layers Moisture Interventions: Apply protective barrier, creams and emollients, Absorbent underpads Activity Interventions: Pressure redistribution bed/mattress(bed type) Mobility Interventions: Pressure redistribution bed/mattress (bed type), HOB 30 degrees or less, Float heels Nutrition Interventions: (NPO) Friction and Shear Interventions: Apply protective barrier, creams and emollients, Lift sheet, Minimize layers Problem: Patient Education: Go to Patient Education Activity Goal: Patient/Family Education Outcome: Progressing Towards Goal 
  
Problem: Emotional Support Needs Goal: Patient/family is receiving emotional support Description: Dre Bowens, son Susanne Felix and family will have emotional support and supportive counseling in coping with pts EOL due to PD within 5 days. Outcome: Progressing Towards Goal 
  
Problem: Anticipatory Grief Goal: Grief heard and acknowledged, anxiety reduced, patient coping identified, patient/family expressed gratitude Description: Wife Dre Bowens and 21year old son Susanne Felix will have support in processing anticipatory grief and relational loss and will have  referral to pre-bereavement within 5 days.    
 
 
Outcome: Progressing Towards Goal

## 2020-12-18 NOTE — PROGRESS NOTES
Problem: Falls - Risk of 
Goal: *Absence of Falls Description: Document Andrzej Yap Fall Risk and appropriate interventions in the flowsheet. Outcome: Progressing Towards Goal 
Note: Fall Risk Interventions: 
Mobility Interventions: Bed/chair exit alarm Mentation Interventions: Bed/chair exit alarm Medication Interventions: Evaluate medications/consider consulting pharmacy Elimination Interventions: Bed/chair exit alarm History of Falls Interventions: Bed/chair exit alarm

## 2020-12-18 NOTE — PROGRESS NOTES
Bedside shift change report given to [address] (oncoming nurse) by Katelyn Pearson (offgoing nurse). Report included the following information SBAR, Kardex, MAR and Recent Results.

## 2020-12-18 NOTE — HOSPICE
[facility] Background: 
Per request of hospice social worker, this Bereavement Counselor/LCSW had spoken with patient's wife and son (age 21) earlier in the week to offer support regarding anticipatory grief regarding patient's end of life care. Son Roland Degroot was not interested talking in that time but agreed to a call later in the week. This interaction: LCSW called and spoke with patient's son Roland Degroot. He said that he was unavailable to talk because he was on his way to the hospital to be with his father, who reportedly was likely to die in the next few hours. LCSW explained bereavement services would be available in the weeks and months to come; Roland Degroot said he is opening to talking at another time. Interventions: LCSW provided emotional support and explained hospice bereavement services prn x 13 months. Plan:   
Patient family will receive support by hospital and hospice staff. After patient dies, death will be reviewed at [facility], then plan of care will be developed for bereavement follow-up. Signed: 
Mert Joseph LCSW [facility]

## 2020-12-18 NOTE — PROGRESS NOTES
12/18/20 0820 Vital Signs Temp (!) 101.3 °F (38.5 °C) Temp Source Axillary Pulse (Heart Rate) (!) 172 Heart Rate Source Monitor Resp Rate 24  
O2 Sat (%) (!) 83 % Level of Consciousness (!) Unresponsive BP (!) 77/50 MAP (Calculated) (!) 59 BP 1 Method Automatic  
BP 1 Location Right arm BP Patient Position At rest  
MEWS Score 12  
hospice patient, actively dying.

## 2020-12-18 NOTE — PROGRESS NOTES
[address] [place] [address] notified wife Jim Su, patient appears to be actively dying at this time. Medicated with scheduled meds for comfort, ordered toradol from Dr. Kasey Elaine for fever, medication given, turned patient supine. Patient unresponsive, heart is bounding, breathing is labored.

## 2020-12-18 NOTE — HOSPICE
Informed of death by primary nurse, Fabby Cruz RN. In to see patient who was noted to be: Without responsive to voice or touch Without spontaneous pulse or respiration after one minute of auscultation Pupils fixed and dilated TOD: 14:13 Pronounced under the service of Abril Velasquez MD

## 2020-12-18 NOTE — DISCHARGE SUMMARY
Hospice Discharge Summary [facility] Good Help to Those in Need Date of Admission: 12/11/2020 Date of Discharge: 12/18/20 Tim Valadez is a 76y.o. year old who was admitted to [facility] at [facility] with a Hospice diagnosis of Parkinson's disease ([address] Sarah Mcrae. The patient's care was focused on comfort and the patient passed away on 12/18/20

## 2020-12-18 NOTE — PROGRESS NOTES
Informed by staff regarding the death of Mr Shan Aly in room 215. Three family members were at the bedside of the . Patient was COVID+ so  did not enter the room. Provided active listening as family shared that they were doing okay. Stated that Jade Jesse had had a blessing for them over the phone earlier today. Assured them of prayers on their behalf. : Rev. Evaristo Ewing; [facility], to contact [phone] [address] call: [phone]

## 2020-12-18 NOTE — HOSPICE
[facility] Good Help to Those in Need 
[phone] Discharge/Death Nursing Note Patient Name: Samm Marino. YOB: 1955 Age: 76 y.o. Date of Death: 20 Admitted Date: 2020 Time of Death: 14:13 Facility of Care: [facility] Level of Care: [facility] Patient Room: 215/01 Hospice Attending: Soto Metzger MD 
Hospice Diagnosis: Parkinson's disease ([facility] Reshma Blend Death Pronouncement Pronouncement of death completed by: Apryl Li RN under the service of Yumiko Hoffman MD 
 
Agency staff was present at the time of death At the time of death the patient was documented as (apneic, pulseless, blood pressure absent,...) The pt  within [facility] The following were notified of the patient's death: family at bedside Medications were disposed of per facility protocol Discharge Summary Discharge Reason: Death Summary of Care Provided: 
 
[x] Post mortem care provided by primary nurse 
[x] Notification of  home by unit secretary 
[] Referrals/Community resources provided:  
[] Goals completed 
[] Durable Medical Equipment vendor notified Disciplines involved: [x] RN [x] SW [x]  [] HARKINS [] Vol [] PT [] OT [] ST [] BC 
 
[x] IDT communication/notification Attending Physician, Dr. Mukul Laughlin, notified of death Bereaved Verify bereaved identified with name, address, telephone number and risk level Advance Care Planning 12/3/2020 Patient's Healthcare Decision Maker is: Legal Next of Kin Confirm Advance Directive None

## 2020-12-21 NOTE — HOSPICE
[facility] LCSW note: This LCSW called pts wife Yvonne Armstrong  to offer condolences and support. LCSW left vm with my contact number to return my call with any needs.

## 2022-02-07 NOTE — ROUTINE PROCESS
Verbal shift change report given to antonio ybarra rn (oncoming nurse) by Michael Boyer (offgoing nurse). Report included the following information SBAR and Kardex. Post-Care Instructions: I reviewed with the patient in detail post-care instructions. Patient is to wear sunprotection, and avoid picking at any of the treated lesions. Pt may apply Vaseline to crusted or scabbing areas. Anesthesia Volume In Cc: 1 Medical Necessity Clause: This procedure was medically necessary because the lesions that were treated were: Medical Necessity Information: It is in your best interest to select a reason for this procedure from the list below. All of these items fulfill various CMS LCD requirements except the new and changing color options. Detail Level: Detailed Render Post-Care Instructions In Note?: no Treatment Number (Will Not Render If 0): 0 Consent: The patient's consent was obtained including but not limited to risks of crusting, scabbing, blistering, scarring, darker or lighter pigmentary change, recurrence, incomplete removal and infection.

## 2023-03-27 NOTE — PROGRESS NOTES
Goal Outcome Evaluation:    AAO X4. TOLERATING DIET & ROOM AIR & ACTIVITY WITH STAND-BY ASSIST TO BR. BLADDER SCAN AFTER VOID WAS 0.    DSG S/P LIVER BX CDI.                    Problem: Breathing Pattern - Ineffective Goal: *Use of effective breathing techniques Outcome: Progressing Towards Goal
